# Patient Record
Sex: MALE | Race: BLACK OR AFRICAN AMERICAN | NOT HISPANIC OR LATINO | Employment: OTHER | URBAN - METROPOLITAN AREA
[De-identification: names, ages, dates, MRNs, and addresses within clinical notes are randomized per-mention and may not be internally consistent; named-entity substitution may affect disease eponyms.]

---

## 2017-05-10 ENCOUNTER — HISTORICAL (OUTPATIENT)
Dept: ENDOSCOPY | Facility: HOSPITAL | Age: 52
End: 2017-05-10

## 2018-02-14 ENCOUNTER — HISTORICAL (OUTPATIENT)
Dept: ADMINISTRATIVE | Facility: HOSPITAL | Age: 53
End: 2018-02-14

## 2018-02-14 LAB
ABS NEUT (OLG): 1.84 X10(3)/MCL (ref 2.1–9.2)
ALBUMIN SERPL-MCNC: 3.2 GM/DL (ref 3.4–5)
ALBUMIN/GLOB SERPL: 0.6 RATIO (ref 1.1–2)
ALP SERPL-CCNC: 111 UNIT/L (ref 50–136)
ALT SERPL-CCNC: 24 UNIT/L (ref 12–78)
AST SERPL-CCNC: 54 UNIT/L (ref 15–37)
BASOPHILS # BLD AUTO: 0 X10(3)/MCL (ref 0–0.2)
BASOPHILS NFR BLD AUTO: 1 %
BILIRUB SERPL-MCNC: 3.2 MG/DL (ref 0.2–1)
BILIRUBIN DIRECT+TOT PNL SERPL-MCNC: 1.4 MG/DL (ref 0–0.5)
BILIRUBIN DIRECT+TOT PNL SERPL-MCNC: 1.8 MG/DL (ref 0–0.8)
BUN SERPL-MCNC: 7 MG/DL (ref 7–18)
CALCIUM SERPL-MCNC: 9.4 MG/DL (ref 8.5–10.1)
CHLORIDE SERPL-SCNC: 104 MMOL/L (ref 98–107)
CO2 SERPL-SCNC: 25 MMOL/L (ref 21–32)
CREAT SERPL-MCNC: 0.82 MG/DL (ref 0.7–1.3)
EOSINOPHIL # BLD AUTO: 0.1 X10(3)/MCL (ref 0–0.9)
EOSINOPHIL NFR BLD AUTO: 4 %
ERYTHROCYTE [DISTWIDTH] IN BLOOD BY AUTOMATED COUNT: 24.4 % (ref 11.5–17)
FERRITIN SERPL-MCNC: 125.3 NG/ML (ref 8–388)
GLOBULIN SER-MCNC: 5.1 GM/DL (ref 2.4–3.5)
GLUCOSE SERPL-MCNC: 100 MG/DL (ref 74–106)
HCT VFR BLD AUTO: 37.9 % (ref 42–52)
HGB BLD-MCNC: 12.5 GM/DL (ref 14–18)
INR PPP: 1.5 (ref 0–1.27)
IRON SATN MFR SERPL: 80.1 % (ref 20–50)
IRON SERPL-MCNC: 249 MCG/DL (ref 50–175)
LYMPHOCYTES # BLD AUTO: 1 X10(3)/MCL (ref 0.6–4.6)
LYMPHOCYTES NFR BLD AUTO: 29 %
MCH RBC QN AUTO: 28.5 PG (ref 27–31)
MCHC RBC AUTO-ENTMCNC: 33 GM/DL (ref 33–36)
MCV RBC AUTO: 86.3 FL (ref 80–94)
MONOCYTES # BLD AUTO: 0.4 X10(3)/MCL (ref 0.1–1.3)
MONOCYTES NFR BLD AUTO: 13 %
NEUTROPHILS # BLD AUTO: 1.84 X10(3)/MCL (ref 1.4–7.9)
NEUTROPHILS NFR BLD AUTO: 54 %
PLATELET # BLD AUTO: 67 X10(3)/MCL (ref 130–400)
PMV BLD AUTO: ABNORMAL FL (ref 7.4–10.4)
POTASSIUM SERPL-SCNC: 4.1 MMOL/L (ref 3.5–5.1)
PROT SERPL-MCNC: 8.3 GM/DL (ref 6.4–8.2)
PROTHROMBIN TIME: 18.6 SECOND(S) (ref 12.2–14.7)
RBC # BLD AUTO: 4.39 X10(6)/MCL (ref 4.7–6.1)
SODIUM SERPL-SCNC: 136 MMOL/L (ref 136–145)
TIBC SERPL-MCNC: 311 MCG/DL (ref 250–450)
TRANSFERRIN SERPL-MCNC: 232 MG/DL (ref 200–360)
WBC # SPEC AUTO: 3.4 X10(3)/MCL (ref 4.5–11.5)

## 2018-11-18 ENCOUNTER — HISTORICAL (OUTPATIENT)
Dept: ADMINISTRATIVE | Facility: HOSPITAL | Age: 53
End: 2018-11-18

## 2019-04-25 ENCOUNTER — HISTORICAL (OUTPATIENT)
Dept: LAB | Facility: HOSPITAL | Age: 54
End: 2019-04-25

## 2019-04-25 LAB
INR PPP: 1.5 (ref 0–1.3)
PROTHROMBIN TIME: 18.2 SECOND(S) (ref 12–14)

## 2019-06-12 ENCOUNTER — HISTORICAL (OUTPATIENT)
Dept: RADIOLOGY | Facility: HOSPITAL | Age: 54
End: 2019-06-12

## 2019-10-22 ENCOUNTER — HISTORICAL (OUTPATIENT)
Dept: ADMINISTRATIVE | Facility: HOSPITAL | Age: 54
End: 2019-10-22

## 2019-10-22 LAB
ABS NEUT (OLG): 3.24 X10(3)/MCL (ref 2.1–9.2)
ACANTHOCYTES (OLG): 0
ALBUMIN SERPL-MCNC: 3.2 GM/DL (ref 3.4–5)
ALBUMIN/GLOB SERPL: 0.6 RATIO (ref 1.1–2)
ALP SERPL-CCNC: 90 UNIT/L (ref 50–136)
ALT SERPL-CCNC: 44 UNIT/L (ref 12–78)
ANISOCYTOSIS BLD QL SMEAR: 1
AST SERPL-CCNC: 67 UNIT/L (ref 15–37)
BASOPHILS NFR BLD MANUAL: 1 % (ref 0–2)
BILIRUB SERPL-MCNC: 2.2 MG/DL (ref 0.2–1)
BILIRUBIN DIRECT+TOT PNL SERPL-MCNC: 1.1 MG/DL (ref 0–0.5)
BILIRUBIN DIRECT+TOT PNL SERPL-MCNC: 1.1 MG/DL (ref 0–0.8)
BUN SERPL-MCNC: 9 MG/DL (ref 7–18)
BURR CELLS BLD QL SMEAR: 0
CALCIUM SERPL-MCNC: 8.5 MG/DL (ref 8.5–10.1)
CHLORIDE SERPL-SCNC: 104 MMOL/L (ref 98–107)
CO2 SERPL-SCNC: 26 MMOL/L (ref 21–32)
CREAT SERPL-MCNC: 0.76 MG/DL (ref 0.7–1.3)
DACRYOCYTES BLD QL SMEAR: 0
EOSINOPHIL NFR BLD MANUAL: 5 % (ref 0–8)
ERYTHROCYTE [DISTWIDTH] IN BLOOD BY AUTOMATED COUNT: 16.3 % (ref 11.5–17)
GLOBULIN SER-MCNC: 5.1 GM/DL (ref 2.4–3.5)
GLUCOSE SERPL-MCNC: 89 MG/DL (ref 74–106)
HCT VFR BLD AUTO: 36.1 % (ref 42–52)
HGB BLD-MCNC: 11.5 GM/DL (ref 14–18)
INR PPP: 1.4 (ref 0–1.3)
LYMPHOCYTES NFR BLD MANUAL: 24 % (ref 13–40)
MACROCYTES BLD QL SMEAR: 1 /MCL
MCH RBC QN AUTO: 27.5 PG (ref 27–31)
MCHC RBC AUTO-ENTMCNC: 31.9 GM/DL (ref 33–36)
MCV RBC AUTO: 86.4 FL (ref 80–94)
MONOCYTES NFR BLD MANUAL: 12 % (ref 2–11)
NEUTROPHILS NFR BLD MANUAL: 58 % (ref 47–80)
OVALOCYTES BLD QL SMEAR: 1
PLATELET # BLD AUTO: 116 X10(3)/MCL (ref 130–400)
PLATELET # BLD EST: ABNORMAL 10*3/UL
PMV BLD AUTO: 11.7 FL (ref 9.4–12.4)
POIKILOCYTOSIS BLD QL SMEAR: 1
POTASSIUM SERPL-SCNC: 3.5 MMOL/L (ref 3.5–5.1)
PROT SERPL-MCNC: 8.3 GM/DL (ref 6.4–8.2)
PROTHROMBIN TIME: 17.4 SECOND(S) (ref 12–14)
RBC # BLD AUTO: 4.18 X10(6)/MCL (ref 4.7–6.1)
SODIUM SERPL-SCNC: 138 MMOL/L (ref 136–145)
SPHEROCYTES BLD QL SMEAR: 0
WBC # SPEC AUTO: 5.7 X10(3)/MCL (ref 4.5–11.5)

## 2020-04-23 ENCOUNTER — HISTORICAL (OUTPATIENT)
Dept: ADMINISTRATIVE | Facility: HOSPITAL | Age: 55
End: 2020-04-23

## 2020-04-23 LAB
ABS NEUT (OLG): 1.84 X10(3)/MCL (ref 2.1–9.2)
AFP-TM SERPL-MCNC: 4.09 NG/ML
ALBUMIN SERPL-MCNC: 3.1 GM/DL (ref 3.5–5)
ALBUMIN/GLOB SERPL: 0.6 RATIO (ref 1.1–2)
ALP SERPL-CCNC: 94 UNIT/L (ref 40–150)
ALT SERPL-CCNC: 28 UNIT/L (ref 0–55)
AST SERPL-CCNC: 56 UNIT/L (ref 5–34)
BASOPHILS # BLD AUTO: 0 X10(3)/MCL (ref 0–0.2)
BASOPHILS NFR BLD AUTO: 1 %
BILIRUB SERPL-MCNC: 2.4 MG/DL
BILIRUBIN DIRECT+TOT PNL SERPL-MCNC: 1.2 MG/DL (ref 0–0.5)
BILIRUBIN DIRECT+TOT PNL SERPL-MCNC: 1.2 MG/DL (ref 0–0.8)
BUN SERPL-MCNC: 8 MG/DL (ref 8.4–25.7)
CALCIUM SERPL-MCNC: 8.4 MG/DL (ref 8.4–10.2)
CHLORIDE SERPL-SCNC: 105 MMOL/L (ref 98–107)
CO2 SERPL-SCNC: 22 MMOL/L (ref 22–29)
CREAT SERPL-MCNC: 0.78 MG/DL (ref 0.73–1.18)
EOSINOPHIL # BLD AUTO: 0.1 X10(3)/MCL (ref 0–0.9)
EOSINOPHIL NFR BLD AUTO: 3 %
ERYTHROCYTE [DISTWIDTH] IN BLOOD BY AUTOMATED COUNT: 18.5 % (ref 11.5–17)
GLOBULIN SER-MCNC: 4.9 GM/DL (ref 2.4–3.5)
GLUCOSE SERPL-MCNC: 99 MG/DL (ref 74–100)
HCT VFR BLD AUTO: 35 % (ref 42–52)
HGB BLD-MCNC: 11.4 GM/DL (ref 14–18)
INR PPP: 1.5 (ref 0–1.3)
LYMPHOCYTES # BLD AUTO: 0.9 X10(3)/MCL (ref 0.6–4.6)
LYMPHOCYTES NFR BLD AUTO: 27 %
MCH RBC QN AUTO: 27.9 PG (ref 27–31)
MCHC RBC AUTO-ENTMCNC: 32.6 GM/DL (ref 33–36)
MCV RBC AUTO: 85.8 FL (ref 80–94)
MONOCYTES # BLD AUTO: 0.5 X10(3)/MCL (ref 0.1–1.3)
MONOCYTES NFR BLD AUTO: 15 %
NEUTROPHILS # BLD AUTO: 1.84 X10(3)/MCL (ref 2.1–9.2)
NEUTROPHILS NFR BLD AUTO: 55 %
PLATELET # BLD AUTO: 60 X10(3)/MCL (ref 130–400)
PMV BLD AUTO: ABNORMAL FL (ref 9.4–12.4)
POTASSIUM SERPL-SCNC: 3.9 MMOL/L (ref 3.5–5.1)
PROT SERPL-MCNC: 8 GM/DL (ref 6.4–8.3)
PROTHROMBIN TIME: 17.1 SECOND(S) (ref 11.1–13.7)
RBC # BLD AUTO: 4.08 X10(6)/MCL (ref 4.7–6.1)
SODIUM SERPL-SCNC: 137 MMOL/L (ref 136–145)
WBC # SPEC AUTO: 3.4 X10(3)/MCL (ref 4.5–11.5)

## 2020-08-31 ENCOUNTER — HISTORICAL (OUTPATIENT)
Dept: ADMINISTRATIVE | Facility: HOSPITAL | Age: 55
End: 2020-08-31

## 2020-09-29 ENCOUNTER — HOSPITAL ENCOUNTER (INPATIENT)
Age: 55
LOS: 3 days | Discharge: HOME | DRG: 433 | End: 2020-10-02
Payer: COMMERCIAL

## 2020-09-29 DIAGNOSIS — I86.1: ICD-10-CM

## 2020-09-29 DIAGNOSIS — K70.30: Primary | ICD-10-CM

## 2020-09-29 DIAGNOSIS — K72.90: ICD-10-CM

## 2020-09-29 DIAGNOSIS — E87.2: ICD-10-CM

## 2020-09-29 DIAGNOSIS — Z79.899: ICD-10-CM

## 2020-09-29 DIAGNOSIS — K57.30: ICD-10-CM
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
## 2020-09-29 DIAGNOSIS — F10.10: ICD-10-CM

## 2020-09-29 DIAGNOSIS — E87.6: ICD-10-CM

## 2020-09-29 DIAGNOSIS — K31.89: ICD-10-CM

## 2020-09-29 DIAGNOSIS — K76.0: ICD-10-CM

## 2020-09-29 DIAGNOSIS — I85.10: ICD-10-CM

## 2020-09-29 DIAGNOSIS — K76.6: ICD-10-CM

## 2020-09-29 DIAGNOSIS — K80.20: ICD-10-CM

## 2020-09-29 DIAGNOSIS — D61.818: ICD-10-CM

## 2020-09-29 DIAGNOSIS — E80.6: ICD-10-CM

## 2020-09-29 DIAGNOSIS — F41.9: ICD-10-CM

## 2020-09-29 DIAGNOSIS — K44.9: ICD-10-CM

## 2020-09-29 DIAGNOSIS — F43.10: ICD-10-CM

## 2020-10-19 ENCOUNTER — TELEPHONE (OUTPATIENT)
Dept: GASTROENTEROLOGY | Facility: CLINIC | Age: 55
End: 2020-10-19

## 2020-10-19 NOTE — TELEPHONE ENCOUNTER
Spoke with Cass at gastroenterology clinic and made her aware that I had not received the referral on Mr. Townsend.     Confirmed our office fax number with Cass and made her aware that I would contact patient when referral is received. Cass has re-faxed referral.

## 2020-10-19 NOTE — TELEPHONE ENCOUNTER
----- Message from Alina Hardy sent at 10/19/2020  8:18 AM CDT -----  States she's calling regarding a referral for possible liver transplant. [facility] she sent the fax on Thursday. Please call Cass (Dr Mauro Lin) [phone]. Thank you

## 2020-10-20 ENCOUNTER — TELEPHONE (OUTPATIENT)
Dept: GASTROENTEROLOGY | Facility: CLINIC | Age: 55
End: 2020-10-20

## 2020-10-20 NOTE — TELEPHONE ENCOUNTER
Attempted to contact patient at [phone] (his parents) and also [phone] (cell) with no answer. Left a voicemail message on parents phone to return call. Voicemail is not set up on patient cell.     Re: referral from Gastro clinic for cirrhosis

## 2020-10-22 ENCOUNTER — TELEPHONE (OUTPATIENT)
Dept: GASTROENTEROLOGY | Facility: CLINIC | Age: 55
End: 2020-10-22

## 2020-10-22 NOTE — TELEPHONE ENCOUNTER
----- Message from Shannan Harris sent at 10/22/2020  9:35 AM CDT -----  .Type:  Sooner Apoointment Request    Caller is requesting a sooner appointment.  Caller declined first available appointment listed below.  Caller will not accept being placed on the waitlist and is requesting a message be sent to doctor.  Name of Caller:  justus //  nurse for Dr nguyen   When is the first available appointment?  Nothing came up   Symptoms pt is coming in on a referral   Would the patient rather a call back or a response via [facility]?   Call    Best Call Back Number:  [phone] (home)  and Dr ne [phone]   Additional Information:    Caller is requesting a call back from the nurse in regards to the pt referral please that they sent to the office 10/15 and the 10/19/2020 please

## 2020-11-25 ENCOUNTER — OFFICE VISIT (OUTPATIENT)
Dept: GASTROENTEROLOGY | Facility: CLINIC | Age: 55
End: 2020-11-25
Payer: MEDICAID

## 2020-11-25 ENCOUNTER — LAB VISIT (OUTPATIENT)
Dept: LAB | Facility: HOSPITAL | Age: 55
End: 2020-11-25
Attending: INTERNAL MEDICINE
Payer: MEDICAID

## 2020-11-25 VITALS — WEIGHT: 188.94 LBS | HEIGHT: 72 IN | BODY MASS INDEX: 25.59 KG/M2

## 2020-11-25 DIAGNOSIS — I85.10 SECONDARY ESOPHAGEAL VARICES WITHOUT BLEEDING: ICD-10-CM

## 2020-11-25 DIAGNOSIS — K70.30 ALCOHOLIC CIRRHOSIS OF LIVER WITHOUT ASCITES: Primary | ICD-10-CM

## 2020-11-25 DIAGNOSIS — K70.30 ALCOHOLIC CIRRHOSIS OF LIVER WITHOUT ASCITES: ICD-10-CM

## 2020-11-25 LAB
AFP SERPL-MCNC: 3.1 NG/ML (ref 0–8.4)
ALBUMIN SERPL BCP-MCNC: 3.4 G/DL (ref 3.5–5.2)
ALP SERPL-CCNC: 100 U/L (ref 55–135)
ALT SERPL W/O P-5'-P-CCNC: 53 U/L (ref 10–44)
ANION GAP SERPL CALC-SCNC: 10 MMOL/L (ref 8–16)
AST SERPL-CCNC: 87 U/L (ref 10–40)
BASOPHILS # BLD AUTO: 0.05 K/UL (ref 0–0.2)
BASOPHILS NFR BLD: 1.2 % (ref 0–1.9)
BILIRUB SERPL-MCNC: 1.1 MG/DL (ref 0.1–1)
BUN SERPL-MCNC: 8 MG/DL (ref 6–20)
CALCIUM SERPL-MCNC: 8.3 MG/DL (ref 8.7–10.5)
CHLORIDE SERPL-SCNC: 110 MMOL/L (ref 95–110)
CO2 SERPL-SCNC: 24 MMOL/L (ref 23–29)
CREAT SERPL-MCNC: 0.9 MG/DL (ref 0.5–1.4)
DIFFERENTIAL METHOD: ABNORMAL
EOSINOPHIL # BLD AUTO: 0.1 K/UL (ref 0–0.5)
EOSINOPHIL NFR BLD: 2.6 % (ref 0–8)
ERYTHROCYTE [DISTWIDTH] IN BLOOD BY AUTOMATED COUNT: 20.4 % (ref 11.5–14.5)
EST. GFR  (AFRICAN AMERICAN): >60 ML/MIN/1.73 M^2
EST. GFR  (NON AFRICAN AMERICAN): >60 ML/MIN/1.73 M^2
GLUCOSE SERPL-MCNC: 97 MG/DL (ref 70–110)
HCT VFR BLD AUTO: 34.2 % (ref 40–54)
HGB BLD-MCNC: 11.1 G/DL (ref 14–18)
IMM GRANULOCYTES # BLD AUTO: 0 K/UL (ref 0–0.04)
IMM GRANULOCYTES NFR BLD AUTO: 0 % (ref 0–0.5)
INR PPP: 1.2 (ref 0.8–1.2)
LYMPHOCYTES # BLD AUTO: 2.1 K/UL (ref 1–4.8)
LYMPHOCYTES NFR BLD: 49.2 % (ref 18–48)
MCH RBC QN AUTO: 27.1 PG (ref 27–31)
MCHC RBC AUTO-ENTMCNC: 32.5 G/DL (ref 32–36)
MCV RBC AUTO: 83 FL (ref 82–98)
MONOCYTES # BLD AUTO: 0.4 K/UL (ref 0.3–1)
MONOCYTES NFR BLD: 9.7 % (ref 4–15)
NEUTROPHILS # BLD AUTO: 1.6 K/UL (ref 1.8–7.7)
NEUTROPHILS NFR BLD: 37.3 % (ref 38–73)
NRBC BLD-RTO: 0 /100 WBC
PLATELET # BLD AUTO: 66 K/UL (ref 150–350)
PMV BLD AUTO: ABNORMAL FL (ref 9.2–12.9)
POTASSIUM SERPL-SCNC: 3.5 MMOL/L (ref 3.5–5.1)
PROT SERPL-MCNC: 8.1 G/DL (ref 6–8.4)
PROTHROMBIN TIME: 12.8 SEC (ref 9–12.5)
RBC # BLD AUTO: 4.1 M/UL (ref 4.6–6.2)
SODIUM SERPL-SCNC: 144 MMOL/L (ref 136–145)
WBC # BLD AUTO: 4.21 K/UL (ref 3.9–12.7)

## 2020-11-25 PROCEDURE — 99213 OFFICE O/P EST LOW 20 MIN: CPT | Mod: PBBFAC | Performed by: INTERNAL MEDICINE

## 2020-11-25 PROCEDURE — 82105 ALPHA-FETOPROTEIN SERUM: CPT

## 2020-11-25 PROCEDURE — 99999 PR PBB SHADOW E&M-EST. PATIENT-LVL III: CPT | Mod: PBBFAC,,, | Performed by: INTERNAL MEDICINE

## 2020-11-25 PROCEDURE — 85610 PROTHROMBIN TIME: CPT

## 2020-11-25 PROCEDURE — 80053 COMPREHEN METABOLIC PANEL: CPT

## 2020-11-25 PROCEDURE — 36415 COLL VENOUS BLD VENIPUNCTURE: CPT

## 2020-11-25 PROCEDURE — 99204 PR OFFICE/OUTPT VISIT, NEW, LEVL IV, 45-59 MIN: ICD-10-PCS | Mod: S$PBB,,, | Performed by: INTERNAL MEDICINE

## 2020-11-25 PROCEDURE — 99204 OFFICE O/P NEW MOD 45 MIN: CPT | Mod: S$PBB,,, | Performed by: INTERNAL MEDICINE

## 2020-11-25 PROCEDURE — 86706 HEP B SURFACE ANTIBODY: CPT

## 2020-11-25 PROCEDURE — 86803 HEPATITIS C AB TEST: CPT

## 2020-11-25 PROCEDURE — 87340 HEPATITIS B SURFACE AG IA: CPT

## 2020-11-25 PROCEDURE — 85025 COMPLETE CBC W/AUTO DIFF WBC: CPT

## 2020-11-25 PROCEDURE — 99999 PR PBB SHADOW E&M-EST. PATIENT-LVL III: ICD-10-PCS | Mod: PBBFAC,,, | Performed by: INTERNAL MEDICINE

## 2020-11-25 NOTE — LETTER
November 25, 2020      Mauro Lin MD  [address] [facility]  Gastroenterology Clinic  [place] [address]           [facility]  [address] [facility] [address] [facility]  [place] [address]  Phone: [phone]  Fax: [phone]          Patient: Yong Townsend   MR Number: [id number]   YOB: 1965   Date of Visit: 11/25/2020       Dear Dr. Mauro Lin:    Thank you for referring Yong Townsend to me for evaluation. Attached you will find relevant portions of my assessment and plan of care.    If you have questions, please do not hesitate to call me. I look forward to following Yong Townsend along with you.    Sincerely,    Sisi Whalen MD    Enclosure  CC:  No Recipients    If you would like to receive this communication electronically, please contact [email or web address] or [phone] to request more information on [employer] access.    For providers and/or their staff who would like to refer a patient to [facility], please contact us through our one-stop-shop provider referral line, [facility], at [phone].    If you feel you have received this communication in error or would no longer like to receive these types of communications, please e-mail [email or web address]

## 2020-11-25 NOTE — PROGRESS NOTES
Subjective:     Yong Townsend is here for evaluation of Cirrhosis (referred by Dr. Lin.)      HPI  Yong Townsend is here for evaluation and management of cirrhosis secondary to alcohol.  He knowledge is that he has a strong history of alcohol use.  He reports he decrease significantly but still had a drink around 2 weeks ago.  He says he is willing to stop drinking he dislikes strong taste.  He did have an episode of variceal bleeding sometime ago for which he was banded.  He has continued to require banding and is also on a beta-blocker.  Denies any issues of hepatic encephalopathy or ascites.  For the most part he feels well.  He remains active in continues to work.    Outside records reviewed labs from April 2020 show an INR of 1.5, white blood cell count 3.4, hemoglobin 11.4, platelet count 60, sodium 137, creatinine 0.78, AST 56, ALT 28, total bilirubin 2.4, albumin 3.1     Last upper endoscopy from June 2020 showed scar tissue from previous banding and small esophageal varices    Ultrasound from January 2020 showed cirrhosis with evidence of portal hypertension    Review of Systems   Constitutional: Negative.    HENT: Negative.    Eyes: Negative.    Respiratory: Negative.    Cardiovascular: Negative.    Gastrointestinal: Negative.    Genitourinary: Negative.    Musculoskeletal: Negative.    Skin: Negative.    Neurological: Negative.    Psychiatric/Behavioral: Negative.        Objective:     Physical Exam  Vitals signs reviewed.   Constitutional:       General: He is not in acute distress.     Appearance: He is well-developed.   HENT:      Head: Normocephalic and atraumatic.      Mouth/Throat:      Pharynx: No oropharyngeal exudate.   Eyes:      General: No scleral icterus.        Right eye: No discharge.         Left eye: No discharge.      Conjunctiva/sclera: Conjunctivae normal.      Pupils: Pupils are equal, round, and reactive to light.   Pulmonary:      Effort: Pulmonary effort is normal. No  respiratory distress.      Breath sounds: Normal breath sounds. No wheezing.   Abdominal:      General: There is no distension.      Palpations: Abdomen is soft.      Tenderness: There is no abdominal tenderness.   Neurological:      Mental Status: He is alert and oriented to person, place, and time.   Psychiatric:         Behavior: Behavior normal.         MELD-Na score: 9 at 11/25/2020  9:14 AM  MELD score: 9 at 11/25/2020  9:14 AM  Calculated from:  Serum Creatinine: 0.9 mg/dL (Rounded to 1 mg/dL) at 11/25/2020  9:14 AM  Serum Sodium: 144 mmol/L (Rounded to 137 mmol/L) at 11/25/2020  9:14 AM  Total Bilirubin: 1.1 mg/dL at 11/25/2020  9:14 AM  INR(ratio): 1.2 at 11/25/2020  9:14 AM  Age: 55 years 10 months    WBC   Date Value Ref Range Status   11/25/2020 4.21 3.90 - 12.70 K/uL Final     Hemoglobin   Date Value Ref Range Status   11/25/2020 11.1 (L) 14.0 - 18.0 g/dL Final     Hematocrit   Date Value Ref Range Status   11/25/2020 34.2 (L) 40.0 - 54.0 % Final     Platelets   Date Value Ref Range Status   11/25/2020 66 (L) 150 - 350 K/uL Final     BUN   Date Value Ref Range Status   11/25/2020 8 6 - 20 mg/dL Final     Creatinine   Date Value Ref Range Status   11/25/2020 0.9 0.5 - 1.4 mg/dL Final     Glucose   Date Value Ref Range Status   11/25/2020 97 70 - 110 mg/dL Final     Calcium   Date Value Ref Range Status   11/25/2020 8.3 (L) 8.7 - 10.5 mg/dL Final     Sodium   Date Value Ref Range Status   11/25/2020 144 136 - 145 mmol/L Final     Potassium   Date Value Ref Range Status   11/25/2020 3.5 3.5 - 5.1 mmol/L Final     Chloride   Date Value Ref Range Status   11/25/2020 110 95 - 110 mmol/L Final     AST   Date Value Ref Range Status   11/25/2020 87 (H) 10 - 40 U/L Final     ALT   Date Value Ref Range Status   11/25/2020 53 (H) 10 - 44 U/L Final     Alkaline Phosphatase   Date Value Ref Range Status   11/25/2020 100 55 - 135 U/L Final     Total Bilirubin   Date Value Ref Range Status   11/25/2020 1.1 (H) 0.1 -  1.0 mg/dL Final     Comment:     For infants and newborns, interpretation of results should be based  on gestational age, weight and in agreement with clinical  observations.  Premature Infant recommended reference ranges:  Up to 24 hours.............<8.0 mg/dL  Up to 48 hours............<12.0 mg/dL  3-5 days..................<15.0 mg/dL  6-29 days.................<15.0 mg/dL       Albumin   Date Value Ref Range Status   11/25/2020 3.4 (L) 3.5 - 5.2 g/dL Final     INR   Date Value Ref Range Status   11/25/2020 1.2 0.8 - 1.2 Final     Comment:     Coumadin Therapy:  2.0 - 3.0 for INR for all indicators except mechanical heart valves  and antiphospholipid syndromes which should use 2.5 - 3.5.           Assessment/Plan:     1. Alcoholic cirrhosis of liver without ascites    2. Secondary esophageal varices without bleeding      Yong Townsend is a 55 y.o. male withCirrhosis (referred by Dr. Lin.)    Alcoholic cirrhosis of liver without ascites-meld score is low and patient without any recent decompensation.  No indication for liver transplantation at this time but did explain to patient that I am concerned about his risk for additional decompensation and need for transplant with continued alcohol use.  Strongly encouraged complete alcohol abstinence to help delay need for transplant and to make sure that he will be a transplant candidate if needed.  -continue to monitor meld score  -due for HCC surveillance, ultrasound ordered and will repeat in 6 months  -     Comprehensive Metabolic Panel; Standing  -     CBC Auto Differential; Standing  -     AFP Tumor Marker; Standing  -     Protime-INR; Standing  -     Hepatitis B Surface Ab, Qualitative; Future; Expected date: 11/25/2020  -     Hepatitis B Surface Antigen; Future; Expected date: 11/25/2020  -     Hepatitis C Antibody; Future; Expected date: 11/25/2020  -     US Abdomen Limited; Standing      Esophageal varices-continue with secondary prevention  -next upper  endoscopy due in June 2021, can be done local endoscopist  -continue on beta-blocker    Return to clinic in 6 months with preclinic labs and imaging      Sisi Whalen MD

## 2020-11-27 LAB
HBV SURFACE AB SER-ACNC: NEGATIVE M[IU]/ML
HBV SURFACE AG SERPL QL IA: NEGATIVE
HCV AB SERPL QL IA: NEGATIVE

## 2020-11-30 ENCOUNTER — TELEPHONE (OUTPATIENT)
Dept: GASTROENTEROLOGY | Facility: CLINIC | Age: 55
End: 2020-11-30

## 2020-11-30 NOTE — TELEPHONE ENCOUNTER
----- Message from Bianca Najera sent at 11/30/2020  8:04 AM CST -----  Contact: [facility]  [facility] with [facility] requesting clinic notes from 11/25/2020. Please fax to [phone]. If needed please call clinic back at [phone]

## 2021-01-08 ENCOUNTER — TELEPHONE (OUTPATIENT)
Dept: HEPATOLOGY | Facility: CLINIC | Age: 56
End: 2021-01-08

## 2021-04-12 ENCOUNTER — TELEPHONE (OUTPATIENT)
Dept: UROLOGY | Facility: CLINIC | Age: 56
End: 2021-04-12

## 2021-05-25 ENCOUNTER — LAB VISIT (OUTPATIENT)
Dept: LAB | Facility: HOSPITAL | Age: 56
End: 2021-05-25
Attending: INTERNAL MEDICINE
Payer: MEDICAID

## 2021-05-25 DIAGNOSIS — K70.30 ALCOHOLIC CIRRHOSIS OF LIVER WITHOUT ASCITES: ICD-10-CM

## 2021-05-25 LAB
AFP SERPL-MCNC: 4.1 NG/ML (ref 0–8.4)
ALBUMIN SERPL BCP-MCNC: 3.3 G/DL (ref 3.5–5.2)
ALP SERPL-CCNC: 114 U/L (ref 55–135)
ALT SERPL W/O P-5'-P-CCNC: 46 U/L (ref 10–44)
ANION GAP SERPL CALC-SCNC: 12 MMOL/L (ref 8–16)
AST SERPL-CCNC: 134 U/L (ref 10–40)
BASOPHILS # BLD AUTO: 0.06 K/UL (ref 0–0.2)
BASOPHILS NFR BLD: 1.8 % (ref 0–1.9)
BILIRUB SERPL-MCNC: 3.3 MG/DL (ref 0.1–1)
BUN SERPL-MCNC: 8 MG/DL (ref 6–20)
CALCIUM SERPL-MCNC: 8.6 MG/DL (ref 8.7–10.5)
CHLORIDE SERPL-SCNC: 110 MMOL/L (ref 95–110)
CO2 SERPL-SCNC: 22 MMOL/L (ref 23–29)
CREAT SERPL-MCNC: 0.8 MG/DL (ref 0.5–1.4)
DIFFERENTIAL METHOD: ABNORMAL
EOSINOPHIL # BLD AUTO: 0.1 K/UL (ref 0–0.5)
EOSINOPHIL NFR BLD: 2.9 % (ref 0–8)
ERYTHROCYTE [DISTWIDTH] IN BLOOD BY AUTOMATED COUNT: 20.6 % (ref 11.5–14.5)
EST. GFR  (AFRICAN AMERICAN): >60 ML/MIN/1.73 M^2
EST. GFR  (NON AFRICAN AMERICAN): >60 ML/MIN/1.73 M^2
GLUCOSE SERPL-MCNC: 102 MG/DL (ref 70–110)
HCT VFR BLD AUTO: 32.8 % (ref 40–54)
HGB BLD-MCNC: 11.1 G/DL (ref 14–18)
IMM GRANULOCYTES # BLD AUTO: 0 K/UL (ref 0–0.04)
IMM GRANULOCYTES NFR BLD AUTO: 0 % (ref 0–0.5)
INR PPP: 1.3 (ref 0.8–1.2)
LYMPHOCYTES # BLD AUTO: 1.3 K/UL (ref 1–4.8)
LYMPHOCYTES NFR BLD: 37.4 % (ref 18–48)
MCH RBC QN AUTO: 28.8 PG (ref 27–31)
MCHC RBC AUTO-ENTMCNC: 33.8 G/DL (ref 32–36)
MCV RBC AUTO: 85 FL (ref 82–98)
MONOCYTES # BLD AUTO: 0.4 K/UL (ref 0.3–1)
MONOCYTES NFR BLD: 10.6 % (ref 4–15)
NEUTROPHILS # BLD AUTO: 1.6 K/UL (ref 1.8–7.7)
NEUTROPHILS NFR BLD: 47.3 % (ref 38–73)
NRBC BLD-RTO: 0 /100 WBC
PLATELET # BLD AUTO: 70 K/UL (ref 150–450)
PMV BLD AUTO: ABNORMAL FL (ref 9.2–12.9)
POTASSIUM SERPL-SCNC: 3.6 MMOL/L (ref 3.5–5.1)
PROT SERPL-MCNC: 8.3 G/DL (ref 6–8.4)
PROTHROMBIN TIME: 13.8 SEC (ref 9–12.5)
RBC # BLD AUTO: 3.85 M/UL (ref 4.6–6.2)
SODIUM SERPL-SCNC: 144 MMOL/L (ref 136–145)
WBC # BLD AUTO: 3.4 K/UL (ref 3.9–12.7)

## 2021-05-25 PROCEDURE — 82105 ALPHA-FETOPROTEIN SERUM: CPT | Performed by: INTERNAL MEDICINE

## 2021-05-25 PROCEDURE — 85025 COMPLETE CBC W/AUTO DIFF WBC: CPT | Performed by: INTERNAL MEDICINE

## 2021-05-25 PROCEDURE — 36415 COLL VENOUS BLD VENIPUNCTURE: CPT | Performed by: INTERNAL MEDICINE

## 2021-05-25 PROCEDURE — 80053 COMPREHEN METABOLIC PANEL: CPT | Performed by: INTERNAL MEDICINE

## 2021-05-25 PROCEDURE — 85610 PROTHROMBIN TIME: CPT | Performed by: INTERNAL MEDICINE

## 2021-06-11 ENCOUNTER — HISTORICAL (OUTPATIENT)
Dept: RADIOLOGY | Facility: HOSPITAL | Age: 56
End: 2021-06-11

## 2021-08-23 ENCOUNTER — TELEPHONE (OUTPATIENT)
Dept: HEPATOLOGY | Facility: CLINIC | Age: 56
End: 2021-08-23

## 2021-08-31 ENCOUNTER — TELEPHONE (OUTPATIENT)
Dept: HEPATOLOGY | Facility: CLINIC | Age: 56
End: 2021-08-31

## 2021-08-31 ENCOUNTER — TELEPHONE (OUTPATIENT)
Dept: RADIOLOGY | Facility: HOSPITAL | Age: 56
End: 2021-08-31

## 2021-09-01 ENCOUNTER — TELEPHONE (OUTPATIENT)
Dept: HEPATOLOGY | Facility: CLINIC | Age: 56
End: 2021-09-01

## 2021-09-16 ENCOUNTER — OFFICE VISIT (OUTPATIENT)
Dept: HEPATOLOGY | Facility: CLINIC | Age: 56
End: 2021-09-16
Payer: MEDICAID

## 2021-09-16 ENCOUNTER — HOSPITAL ENCOUNTER (OUTPATIENT)
Dept: RADIOLOGY | Facility: HOSPITAL | Age: 56
Discharge: HOME | End: 2021-09-16
Attending: INTERNAL MEDICINE
Payer: MEDICAID

## 2021-09-16 VITALS
HEART RATE: 73 BPM | SYSTOLIC BLOOD PRESSURE: 120 MMHG | DIASTOLIC BLOOD PRESSURE: 72 MMHG | HEIGHT: 72 IN | WEIGHT: 179.25 LBS | BODY MASS INDEX: 24.28 KG/M2

## 2021-09-16 DIAGNOSIS — I85.10 SECONDARY ESOPHAGEAL VARICES WITHOUT BLEEDING: ICD-10-CM

## 2021-09-16 DIAGNOSIS — Z78.9 ALCOHOL USE: ICD-10-CM

## 2021-09-16 DIAGNOSIS — K70.30 ALCOHOLIC CIRRHOSIS OF LIVER WITHOUT ASCITES: Primary | ICD-10-CM

## 2021-09-16 PROBLEM — F10.90 ALCOHOL USE: Status: ACTIVE | Noted: 2021-09-16

## 2021-09-16 PROCEDURE — 99213 OFFICE O/P EST LOW 20 MIN: CPT | Mod: PBBFAC,25 | Performed by: NURSE PRACTITIONER

## 2021-09-16 PROCEDURE — 76705 ECHO EXAM OF ABDOMEN: CPT | Mod: TC

## 2021-09-16 PROCEDURE — 76705 US ABDOMEN LIMITED: ICD-10-PCS | Mod: 26,,, | Performed by: RADIOLOGY

## 2021-09-16 PROCEDURE — 76705 ECHO EXAM OF ABDOMEN: CPT | Mod: 26,,, | Performed by: RADIOLOGY

## 2021-09-16 PROCEDURE — 99214 PR OFFICE/OUTPT VISIT, EST, LEVL IV, 30-39 MIN: ICD-10-PCS | Mod: S$PBB,,, | Performed by: NURSE PRACTITIONER

## 2021-09-16 PROCEDURE — 99214 OFFICE O/P EST MOD 30 MIN: CPT | Mod: S$PBB,,, | Performed by: NURSE PRACTITIONER

## 2021-09-16 PROCEDURE — 99999 PR PBB SHADOW E&M-EST. PATIENT-LVL III: ICD-10-PCS | Mod: PBBFAC,,, | Performed by: NURSE PRACTITIONER

## 2021-09-16 PROCEDURE — 99999 PR PBB SHADOW E&M-EST. PATIENT-LVL III: CPT | Mod: PBBFAC,,, | Performed by: NURSE PRACTITIONER

## 2021-09-16 RX ORDER — MELOXICAM 15 MG/1
7.5 TABLET ORAL 2 TIMES DAILY
COMMUNITY
Start: 2021-08-17 | End: 2022-01-10

## 2021-09-16 RX ORDER — PROPRANOLOL HYDROCHLORIDE 20 MG/1
20 TABLET ORAL 2 TIMES DAILY
COMMUNITY
Start: 2021-06-07

## 2021-09-16 RX ORDER — FOLIC ACID 1 MG/1
1000 TABLET ORAL EVERY MORNING
COMMUNITY
Start: 2021-06-07 | End: 2022-01-10 | Stop reason: SDUPTHER

## 2021-11-02 ENCOUNTER — HISTORICAL (OUTPATIENT)
Dept: ADMINISTRATIVE | Facility: HOSPITAL | Age: 56
End: 2021-11-02

## 2021-12-10 ENCOUNTER — TELEPHONE (OUTPATIENT)
Dept: HEPATOLOGY | Facility: CLINIC | Age: 56
End: 2021-12-10
Payer: MEDICAID

## 2022-01-10 ENCOUNTER — LAB VISIT (OUTPATIENT)
Dept: LAB | Facility: HOSPITAL | Age: 57
End: 2022-01-10
Attending: INTERNAL MEDICINE
Payer: MEDICAID

## 2022-01-10 ENCOUNTER — OFFICE VISIT (OUTPATIENT)
Dept: HEPATOLOGY | Facility: CLINIC | Age: 57
End: 2022-01-10
Payer: MEDICAID

## 2022-01-10 VITALS
HEART RATE: 74 BPM | BODY MASS INDEX: 25.5 KG/M2 | SYSTOLIC BLOOD PRESSURE: 150 MMHG | WEIGHT: 188.25 LBS | DIASTOLIC BLOOD PRESSURE: 90 MMHG | HEIGHT: 72 IN

## 2022-01-10 DIAGNOSIS — K70.30 ALCOHOLIC CIRRHOSIS OF LIVER WITHOUT ASCITES: ICD-10-CM

## 2022-01-10 DIAGNOSIS — K70.30 ALCOHOLIC CIRRHOSIS OF LIVER WITHOUT ASCITES: Primary | ICD-10-CM

## 2022-01-10 LAB
ALBUMIN SERPL BCP-MCNC: 2.6 G/DL (ref 3.5–5.2)
ALP SERPL-CCNC: 134 U/L (ref 55–135)
ALT SERPL W/O P-5'-P-CCNC: 25 U/L (ref 10–44)
ANION GAP SERPL CALC-SCNC: 8 MMOL/L (ref 8–16)
AST SERPL-CCNC: 98 U/L (ref 10–40)
BILIRUB SERPL-MCNC: 7.5 MG/DL (ref 0.1–1)
BUN SERPL-MCNC: 8 MG/DL (ref 6–20)
CALCIUM SERPL-MCNC: 8.5 MG/DL (ref 8.7–10.5)
CHLORIDE SERPL-SCNC: 105 MMOL/L (ref 95–110)
CO2 SERPL-SCNC: 23 MMOL/L (ref 23–29)
CREAT SERPL-MCNC: 0.7 MG/DL (ref 0.5–1.4)
ERYTHROCYTE [DISTWIDTH] IN BLOOD BY AUTOMATED COUNT: 21.3 % (ref 11.5–14.5)
EST. GFR  (AFRICAN AMERICAN): >60 ML/MIN/1.73 M^2
EST. GFR  (NON AFRICAN AMERICAN): >60 ML/MIN/1.73 M^2
GLUCOSE SERPL-MCNC: 89 MG/DL (ref 70–110)
HCT VFR BLD AUTO: 26.9 % (ref 40–54)
HGB BLD-MCNC: 8.8 G/DL (ref 14–18)
INR PPP: 1.5 (ref 0.8–1.2)
MCH RBC QN AUTO: 28.5 PG (ref 27–31)
MCHC RBC AUTO-ENTMCNC: 32.7 G/DL (ref 32–36)
MCV RBC AUTO: 87 FL (ref 82–98)
PLATELET # BLD AUTO: 40 K/UL (ref 150–450)
PMV BLD AUTO: ABNORMAL FL (ref 9.2–12.9)
POTASSIUM SERPL-SCNC: 3.5 MMOL/L (ref 3.5–5.1)
PROT SERPL-MCNC: 7.9 G/DL (ref 6–8.4)
PROTHROMBIN TIME: 15.3 SEC (ref 9–12.5)
RBC # BLD AUTO: 3.09 M/UL (ref 4.6–6.2)
SODIUM SERPL-SCNC: 136 MMOL/L (ref 136–145)
WBC # BLD AUTO: 3.09 K/UL (ref 3.9–12.7)

## 2022-01-10 PROCEDURE — 99999 PR PBB SHADOW E&M-EST. PATIENT-LVL III: CPT | Mod: PBBFAC,,, | Performed by: INTERNAL MEDICINE

## 2022-01-10 PROCEDURE — 99213 OFFICE O/P EST LOW 20 MIN: CPT | Mod: PBBFAC | Performed by: INTERNAL MEDICINE

## 2022-01-10 PROCEDURE — 3008F BODY MASS INDEX DOCD: CPT | Mod: CPTII,,, | Performed by: INTERNAL MEDICINE

## 2022-01-10 PROCEDURE — 3080F PR MOST RECENT DIASTOLIC BLOOD PRESSURE >= 90 MM HG: ICD-10-PCS | Mod: CPTII,,, | Performed by: INTERNAL MEDICINE

## 2022-01-10 PROCEDURE — 1159F PR MEDICATION LIST DOCUMENTED IN MEDICAL RECORD: ICD-10-PCS | Mod: CPTII,,, | Performed by: INTERNAL MEDICINE

## 2022-01-10 PROCEDURE — 1159F MED LIST DOCD IN RCRD: CPT | Mod: CPTII,,, | Performed by: INTERNAL MEDICINE

## 2022-01-10 PROCEDURE — 3077F SYST BP >= 140 MM HG: CPT | Mod: CPTII,,, | Performed by: INTERNAL MEDICINE

## 2022-01-10 PROCEDURE — 99214 OFFICE O/P EST MOD 30 MIN: CPT | Mod: S$PBB,,, | Performed by: INTERNAL MEDICINE

## 2022-01-10 PROCEDURE — 3008F PR BODY MASS INDEX (BMI) DOCUMENTED: ICD-10-PCS | Mod: CPTII,,, | Performed by: INTERNAL MEDICINE

## 2022-01-10 PROCEDURE — 99214 PR OFFICE/OUTPT VISIT, EST, LEVL IV, 30-39 MIN: ICD-10-PCS | Mod: S$PBB,,, | Performed by: INTERNAL MEDICINE

## 2022-01-10 PROCEDURE — 1160F PR REVIEW ALL MEDS BY PRESCRIBER/CLIN PHARMACIST DOCUMENTED: ICD-10-PCS | Mod: CPTII,,, | Performed by: INTERNAL MEDICINE

## 2022-01-10 PROCEDURE — 80053 COMPREHEN METABOLIC PANEL: CPT | Performed by: INTERNAL MEDICINE

## 2022-01-10 PROCEDURE — 85610 PROTHROMBIN TIME: CPT | Performed by: INTERNAL MEDICINE

## 2022-01-10 PROCEDURE — 85027 COMPLETE CBC AUTOMATED: CPT | Performed by: INTERNAL MEDICINE

## 2022-01-10 PROCEDURE — 99999 PR PBB SHADOW E&M-EST. PATIENT-LVL III: ICD-10-PCS | Mod: PBBFAC,,, | Performed by: INTERNAL MEDICINE

## 2022-01-10 PROCEDURE — 3080F DIAST BP >= 90 MM HG: CPT | Mod: CPTII,,, | Performed by: INTERNAL MEDICINE

## 2022-01-10 PROCEDURE — 1160F RVW MEDS BY RX/DR IN RCRD: CPT | Mod: CPTII,,, | Performed by: INTERNAL MEDICINE

## 2022-01-10 PROCEDURE — 36415 COLL VENOUS BLD VENIPUNCTURE: CPT | Performed by: INTERNAL MEDICINE

## 2022-01-10 PROCEDURE — 3077F PR MOST RECENT SYSTOLIC BLOOD PRESSURE >= 140 MM HG: ICD-10-PCS | Mod: CPTII,,, | Performed by: INTERNAL MEDICINE

## 2022-01-10 RX ORDER — FOLIC ACID 1 MG/1
1000 TABLET ORAL EVERY MORNING
Qty: 30 TABLET | Refills: 11 | Status: SHIPPED | OUTPATIENT
Start: 2022-01-10

## 2022-01-10 NOTE — PROGRESS NOTES
"  Subjective:     Yong Townsend is here for evaluation of cirrhosis    History of Present Illness:    Since Yong Townsend's last visit, Says he had some wine for [place] and before that he does not remember when he had ETOH. Says he "cut down a lot" since dx of cirrhosis but was told by [facility] that he can have "wine or beer" occasionally, so he had it for [place] but didn't drink any for New Year. He reports undergoing detox, attended ETOH rehab in the last 5 yrs stayed sober for a long time and resumed only few drinks occasionally and he considers it was successful. He does not believe in AA says it did not help him. He is interested in IOP program as recommended by transplant program. He admits to smoking weed, says with severe anxiety/PTSD he tried multiple prescription medication and learned none of them help him as much as marijuana does. He informs me that marijuana is legalized now in [place]. He reportedly had GI bleed in the past with EGD showing EV with variceal banding. Denies episodes of ascites, confusion.    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       Review of Systems   Constitutional: Negative for fatigue and fever.   Eyes: Positive for visual disturbance.   Gastrointestinal: Negative for abdominal distention, abdominal pain and nausea.   Neurological: Positive for tremors.       Objective:     Physical Exam  Constitutional:       General: He is in acute distress.      Appearance: Normal appearance. He is ill-appearing.   Eyes:      General: Scleral icterus present.   Abdominal:      General: Bowel sounds are normal. There is no distension.      Palpations: Abdomen is soft.      Tenderness: There is no abdominal tenderness.   Skin:     Capillary Refill: Capillary refill takes more than 3 seconds.      Coloration: Skin is not jaundiced.      Findings: No bruising " or erythema.   Neurological:      Mental Status: He is alert.   Psychiatric:         Thought Content: Thought content normal.         Judgment: Judgment normal.         MELD-Na score: 18 at 9/16/2021 11:53 AM  MELD score: 18 at 9/16/2021 11:53 AM  Calculated from:  Serum Creatinine: 0.8 mg/dL (Using min of 1 mg/dL) at 9/16/2021 11:53 AM  Serum Sodium: 139 mmol/L (Using max of 137 mmol/L) at 9/16/2021 11:53 AM  Total Bilirubin: 8.5 mg/dL at 9/16/2021 11:53 AM  INR(ratio): 1.4 at 9/16/2021 11:53 AM  Age: 56 years    WBC   Date Value Ref Range Status   05/25/2021 3.40 (L) 3.90 - 12.70 K/uL Final     Hemoglobin   Date Value Ref Range Status   05/25/2021 11.1 (L) 14.0 - 18.0 g/dL Final     Hematocrit   Date Value Ref Range Status   05/25/2021 32.8 (L) 40.0 - 54.0 % Final     Platelets   Date Value Ref Range Status   05/25/2021 70 (L) 150 - 450 K/uL Final     BUN   Date Value Ref Range Status   09/16/2021 5 (L) 6 - 20 mg/dL Final     Creatinine   Date Value Ref Range Status   09/16/2021 0.8 0.5 - 1.4 mg/dL Final     Glucose   Date Value Ref Range Status   09/16/2021 98 70 - 110 mg/dL Final     Calcium   Date Value Ref Range Status   09/16/2021 8.7 8.7 - 10.5 mg/dL Final     Sodium   Date Value Ref Range Status   09/16/2021 139 136 - 145 mmol/L Final     Potassium   Date Value Ref Range Status   09/16/2021 3.5 3.5 - 5.1 mmol/L Final     Chloride   Date Value Ref Range Status   09/16/2021 107 95 - 110 mmol/L Final     AST   Date Value Ref Range Status   09/16/2021 129 (H) 10 - 40 U/L Final     ALT   Date Value Ref Range Status   09/16/2021 34 10 - 44 U/L Final     Alkaline Phosphatase   Date Value Ref Range Status   09/16/2021 127 55 - 135 U/L Final     Total Bilirubin   Date Value Ref Range Status   09/16/2021 8.5 (H) 0.1 - 1.0 mg/dL Final     Comment:     For infants and newborns, interpretation of results should be based  on gestational age, weight and in agreement with clinical  observations.    Premature Infant  "recommended reference ranges:  Up to 24 hours.............<8.0 mg/dL  Up to 48 hours............<12.0 mg/dL  3-5 days..................<15.0 mg/dL  6-29 days.................<15.0 mg/dL       Albumin   Date Value Ref Range Status   09/16/2021 2.6 (L) 3.5 - 5.2 g/dL Final     INR   Date Value Ref Range Status   09/16/2021 1.4 (H) 0.8 - 1.2 Final     Comment:     Coumadin Therapy:  2.0 - 3.0 for INR for all indicators except mechanical heart valves  and antiphospholipid syndromes which should use 2.5 - 3.5.           Assessment/Plan:     1. Alcoholic cirrhosis of liver without ascites      1.Cirrhosis-    MELD-25 noted steadily increasing  Continue to monitor MELD labs  Continue with HCC surveillance- last US 9/6/21 showed no liver lesions  Continue variceal screening-last EGD 2021( report not available)   Endoscopy from June 2020 showed scar tissue from previous banding and small esophageal varices  Continue propranolol for secondary prevention    Had extensive discussion over his ongoing ETOH intake inspite of reporting " cut down" since 2020 he continues to consume ETOH. Informed ongoing liver damage and worsening LFTs, increasing risk of decompensation leading liver failure and death. Says he will not be drinking further, agrees to have psychiatry evaluation.     RTC in 3 months with preclinic labs.    I have reviewed existing labs, imaging. Educated patient and family about disease process, prognosis. Ordered required labs, images, procedures and discussed treatment plan.       Lula Burk MD  Transplant Hepatologist  Dept of Hepatology, [place] [facility] [place]        "

## 2022-02-18 ENCOUNTER — HISTORICAL (OUTPATIENT)
Dept: RADIOLOGY | Facility: HOSPITAL | Age: 57
End: 2022-02-18

## 2022-02-18 ENCOUNTER — HISTORICAL (OUTPATIENT)
Dept: ADMINISTRATIVE | Facility: HOSPITAL | Age: 57
End: 2022-02-18

## 2022-02-18 LAB
ABS NEUT (OLG): 1.14 (ref 2.1–9.2)
ALBUMIN SERPL-MCNC: 2.6 G/DL (ref 3.5–5)
ALBUMIN/GLOB SERPL: 0.6 {RATIO} (ref 1.1–2)
ALP SERPL-CCNC: 105 U/L (ref 40–150)
ALT SERPL-CCNC: 15 U/L (ref 0–55)
ANISOCYTOSIS BLD QL SMEAR: 1
AST SERPL-CCNC: 39 U/L (ref 5–34)
BASOPHILS NFR BLD MANUAL: 1 % (ref 0–2)
BILIRUB SERPL-MCNC: 3.7 MG/DL
BILIRUBIN DIRECT+TOT PNL SERPL-MCNC: 1.4 (ref 0–0.8)
BILIRUBIN DIRECT+TOT PNL SERPL-MCNC: 2.3 (ref 0–0.5)
BUN SERPL-MCNC: 6.6 MG/DL (ref 8.4–25.7)
CALCIUM SERPL-MCNC: 8.5 MG/DL (ref 8.7–10.5)
CHLORIDE SERPL-SCNC: 109 MMOL/L (ref 98–107)
CHOLEST SERPL-MCNC: 257 MG/DL
CHOLEST/HDLC SERPL: 5 {RATIO} (ref 0–5)
CO2 SERPL-SCNC: 25 MMOL/L (ref 22–29)
CREAT SERPL-MCNC: 0.88 MG/DL (ref 0.73–1.18)
CRP SERPL HS-MCNC: 0.44 MG/L
DEPRECATED CALCIDIOL+CALCIFEROL SERPL-MC: 18 NG/ML (ref 30–80)
EOSINOPHIL NFR BLD MANUAL: 5 % (ref 0–8)
ERYTHROCYTE [DISTWIDTH] IN BLOOD BY AUTOMATED COUNT: 22.2 % (ref 11.5–17)
ERYTHROCYTE [SEDIMENTATION RATE] IN BLOOD: 20 MM/H (ref 0–15)
EST. AVERAGE GLUCOSE BLD GHB EST-MCNC: 96.8 MG/DL
FERRITIN SERPL-MCNC: 27.94 NG/ML (ref 21.81–274.66)
GGT SERPL-CCNC: 239 U/L (ref 12–64)
GLOBULIN SER-MCNC: 4.5 G/DL (ref 2.4–3.5)
GLUCOSE SERPL-MCNC: 88 MG/DL (ref 74–100)
HBA1C MFR BLD: 5 %
HCT VFR BLD AUTO: 24.9 % (ref 42–52)
HDLC SERPL-MCNC: 51 MG/DL (ref 35–60)
HEMOLYSIS INTERF INDEX SERPL-ACNC: 0
HGB BLD-MCNC: 8.6 G/DL (ref 14–18)
ICTERIC INTERF INDEX SERPL-ACNC: 4
INR PPP: 1.8 (ref 0–1.3)
LDLC SERPL CALC-MCNC: 182 MG/DL (ref 50–140)
LIPEMIC INTERF INDEX SERPL-ACNC: 21
LYMPHOCYTES NFR BLD MANUAL: 22 % (ref 13–40)
MACROCYTES BLD QL SMEAR: 1
MANUAL DIFF? (OHS): YES
MCH RBC QN AUTO: 27.9 PG (ref 27–31)
MCHC RBC AUTO-ENTMCNC: 34.5 G/DL (ref 33–36)
MCV RBC AUTO: 80.8 FL (ref 80–94)
MONOCYTES NFR BLD MANUAL: 6 % (ref 2–11)
NEUTROPHILS NFR BLD MANUAL: 67 % (ref 47–80)
PLATELET # BLD AUTO: 43 10*3/UL (ref 130–400)
PMV BLD AUTO: NORMAL FL (ref 9.4–12.4)
POIKILOCYTOSIS BLD QL SMEAR: 1
POS ERR1 (OHS): NORMAL
POS ERR2 (OHS): NORMAL
POTASSIUM SERPL-SCNC: 3.7 MMOL/L (ref 3.5–5.1)
PROT SERPL-MCNC: 7.1 G/DL (ref 6.4–8.3)
PROTHROMBIN TIME: 20.4 S (ref 12.5–14.5)
RBC # BLD AUTO: 3.08 10*6/UL (ref 4.7–6.1)
SODIUM SERPL-SCNC: 141 MMOL/L (ref 136–145)
TARGETS BLD QL SMEAR: 1
TRIGL SERPL-MCNC: 122 MG/DL (ref 34–140)
VERIFY DIFF SUSPECT FLAG (OHS): NORMAL
VLDLC SERPL CALC-MCNC: 24 MG/DL
WBC # SPEC AUTO: 2.2 10*3/UL (ref 4.5–11.5)

## 2022-03-06 ENCOUNTER — HISTORICAL (OUTPATIENT)
Dept: ADMINISTRATIVE | Facility: HOSPITAL | Age: 57
End: 2022-03-06

## 2022-03-07 ENCOUNTER — HISTORICAL (OUTPATIENT)
Dept: ADMINISTRATIVE | Facility: HOSPITAL | Age: 57
End: 2022-03-07

## 2022-04-11 ENCOUNTER — OFFICE VISIT (OUTPATIENT)
Dept: HEPATOLOGY | Facility: CLINIC | Age: 57
End: 2022-04-11
Payer: MEDICAID

## 2022-04-11 VITALS
HEIGHT: 71 IN | WEIGHT: 183.88 LBS | BODY MASS INDEX: 25.74 KG/M2 | HEART RATE: 74 BPM | DIASTOLIC BLOOD PRESSURE: 80 MMHG | SYSTOLIC BLOOD PRESSURE: 140 MMHG

## 2022-04-11 DIAGNOSIS — K74.60 CIRRHOSIS OF LIVER WITHOUT ASCITES, UNSPECIFIED HEPATIC CIRRHOSIS TYPE: Primary | ICD-10-CM

## 2022-04-11 PROCEDURE — 3077F SYST BP >= 140 MM HG: CPT | Mod: CPTII,,, | Performed by: INTERNAL MEDICINE

## 2022-04-11 PROCEDURE — 3079F PR MOST RECENT DIASTOLIC BLOOD PRESSURE 80-89 MM HG: ICD-10-PCS | Mod: CPTII,,, | Performed by: INTERNAL MEDICINE

## 2022-04-11 PROCEDURE — 1159F PR MEDICATION LIST DOCUMENTED IN MEDICAL RECORD: ICD-10-PCS | Mod: CPTII,,, | Performed by: INTERNAL MEDICINE

## 2022-04-11 PROCEDURE — 3079F DIAST BP 80-89 MM HG: CPT | Mod: CPTII,,, | Performed by: INTERNAL MEDICINE

## 2022-04-11 PROCEDURE — 3008F BODY MASS INDEX DOCD: CPT | Mod: CPTII,,, | Performed by: INTERNAL MEDICINE

## 2022-04-11 PROCEDURE — 1159F MED LIST DOCD IN RCRD: CPT | Mod: CPTII,,, | Performed by: INTERNAL MEDICINE

## 2022-04-11 PROCEDURE — 99214 OFFICE O/P EST MOD 30 MIN: CPT | Mod: S$PBB,,, | Performed by: INTERNAL MEDICINE

## 2022-04-11 PROCEDURE — 3008F PR BODY MASS INDEX (BMI) DOCUMENTED: ICD-10-PCS | Mod: CPTII,,, | Performed by: INTERNAL MEDICINE

## 2022-04-11 PROCEDURE — 1160F RVW MEDS BY RX/DR IN RCRD: CPT | Mod: CPTII,,, | Performed by: INTERNAL MEDICINE

## 2022-04-11 PROCEDURE — 99213 OFFICE O/P EST LOW 20 MIN: CPT | Mod: PBBFAC | Performed by: INTERNAL MEDICINE

## 2022-04-11 PROCEDURE — 3077F PR MOST RECENT SYSTOLIC BLOOD PRESSURE >= 140 MM HG: ICD-10-PCS | Mod: CPTII,,, | Performed by: INTERNAL MEDICINE

## 2022-04-11 PROCEDURE — 99999 PR PBB SHADOW E&M-EST. PATIENT-LVL III: CPT | Mod: PBBFAC,,, | Performed by: INTERNAL MEDICINE

## 2022-04-11 PROCEDURE — 1160F PR REVIEW ALL MEDS BY PRESCRIBER/CLIN PHARMACIST DOCUMENTED: ICD-10-PCS | Mod: CPTII,,, | Performed by: INTERNAL MEDICINE

## 2022-04-11 PROCEDURE — 99999 PR PBB SHADOW E&M-EST. PATIENT-LVL III: ICD-10-PCS | Mod: PBBFAC,,, | Performed by: INTERNAL MEDICINE

## 2022-04-11 PROCEDURE — 99214 PR OFFICE/OUTPT VISIT, EST, LEVL IV, 30-39 MIN: ICD-10-PCS | Mod: S$PBB,,, | Performed by: INTERNAL MEDICINE

## 2022-04-11 RX ORDER — CHOLECALCIFEROL (VITAMIN D3) 50 MCG
1 TABLET ORAL DAILY
COMMUNITY
Start: 2021-12-29 | End: 2022-06-09 | Stop reason: ALTCHOICE

## 2022-04-11 RX ORDER — PANTOPRAZOLE SODIUM 40 MG/1
40 TABLET, DELAYED RELEASE ORAL DAILY
Status: ON HOLD | COMMUNITY
Start: 2022-03-28 | End: 2022-08-16 | Stop reason: SDUPTHER

## 2022-04-11 RX ORDER — LANOLIN ALCOHOL/MO/W.PET/CERES
1 CREAM (GRAM) TOPICAL 2 TIMES DAILY
COMMUNITY
Start: 2022-03-09

## 2022-04-11 RX ORDER — THIAMINE HCL 50 MG
50 TABLET ORAL DAILY
COMMUNITY
Start: 2021-10-21

## 2022-04-11 RX ORDER — ERGOCALCIFEROL 1.25 MG/1
50000 CAPSULE ORAL
COMMUNITY
Start: 2022-03-29 | End: 2022-04-11

## 2022-04-11 RX ORDER — POTASSIUM CHLORIDE 1500 MG/1
20 TABLET, EXTENDED RELEASE ORAL DAILY
COMMUNITY
Start: 2022-03-09

## 2022-04-11 NOTE — PROGRESS NOTES
"  Subjective:     Yong Townsend is here for evaluation of cirrhosis    History of Present Illness:    Since Yong Townsend's last visit, Says he had some wine for [place] and before that he does not remember when he had ETOH. Says he "cut down a lot" since dx of cirrhosis but was told by [facility] that he can have "wine or beer" occasionally, so he had it for [place] but didn't drink any for New Year. He reports undergoing detox, attended ETOH rehab in the last 5 yrs stayed sober for a long time and resumed only few drinks occasionally and he considers it was successful. He does not believe in AA says it did not help him. He is interested in IOP program as recommended by transplant program. He admits to smoking weed, says with severe anxiety/PTSD he tried multiple prescription medication and learned none of them help him as much as marijuana does. He informs me that marijuana is legalized now in [place]. He reportedly had GI bleed in the past with EGD showing EV with variceal banding. Denies episodes of ascites, confusion.    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     Interval history: 4/11/22    Came to [place] for follow-up last night.  Denies episodes of vomiting blood, black stool, maroon stools or bright red blood per rectum COVID denies episodes of confusion, abdominal swelling or lower extremity edema could assist he does not remember when was the last time he had alcohol.      Review of Systems   Constitutional: Negative for fatigue and fever.   Eyes: Negative for visual disturbance.   Gastrointestinal: Negative for abdominal distention, abdominal pain and nausea.   Neurological: Negative for tremors.       Objective:     Physical Exam  Constitutional:       General: He is not in acute distress.     Appearance: Normal appearance. He is ill-appearing.   Eyes:      " General: Scleral icterus present.   Abdominal:      General: Bowel sounds are normal. There is no distension.      Palpations: Abdomen is soft.      Tenderness: There is no abdominal tenderness.   Skin:     Capillary Refill: Capillary refill takes more than 3 seconds.      Coloration: Skin is not jaundiced.      Findings: No bruising or erythema.   Neurological:      Mental Status: He is alert.   Psychiatric:         Thought Content: Thought content normal.         Judgment: Judgment normal.         MELD-Na score: 20 at 1/10/2022  1:01 PM  MELD score: 19 at 1/10/2022  1:01 PM  Calculated from:  Serum Creatinine: 0.7 mg/dL (Using min of 1 mg/dL) at 1/10/2022  1:01 PM  Serum Sodium: 136 mmol/L at 1/10/2022  1:01 PM  Total Bilirubin: 7.5 mg/dL at 1/10/2022  1:01 PM  INR(ratio): 1.5 at 1/10/2022  1:01 PM  Age: 56 years    WBC   Date Value Ref Range Status   01/10/2022 3.09 (L) 3.90 - 12.70 K/uL Final     Hemoglobin   Date Value Ref Range Status   01/10/2022 8.8 (L) 14.0 - 18.0 g/dL Final     Hematocrit   Date Value Ref Range Status   01/10/2022 26.9 (L) 40.0 - 54.0 % Final     Platelets   Date Value Ref Range Status   01/10/2022 40 (L) 150 - 450 K/uL Final     BUN   Date Value Ref Range Status   01/10/2022 8 6 - 20 mg/dL Final     Creatinine   Date Value Ref Range Status   01/10/2022 0.7 0.5 - 1.4 mg/dL Final     Glucose   Date Value Ref Range Status   01/10/2022 89 70 - 110 mg/dL Final     Calcium   Date Value Ref Range Status   01/10/2022 8.5 (L) 8.7 - 10.5 mg/dL Final     Sodium   Date Value Ref Range Status   01/10/2022 136 136 - 145 mmol/L Final     Potassium   Date Value Ref Range Status   01/10/2022 3.5 3.5 - 5.1 mmol/L Final     Chloride   Date Value Ref Range Status   01/10/2022 105 95 - 110 mmol/L Final     AST   Date Value Ref Range Status   01/10/2022 98 (H) 10 - 40 U/L Final     ALT   Date Value Ref Range Status   01/10/2022 25 10 - 44 U/L Final     Alkaline Phosphatase   Date Value Ref Range Status    01/10/2022 134 55 - 135 U/L Final     Total Bilirubin   Date Value Ref Range Status   01/10/2022 7.5 (H) 0.1 - 1.0 mg/dL Final     Comment:     For infants and newborns, interpretation of results should be based  on gestational age, weight and in agreement with clinical  observations.    Premature Infant recommended reference ranges:  Up to 24 hours.............<8.0 mg/dL  Up to 48 hours............<12.0 mg/dL  3-5 days..................<15.0 mg/dL  6-29 days.................<15.0 mg/dL       Albumin   Date Value Ref Range Status   01/10/2022 2.6 (L) 3.5 - 5.2 g/dL Final     INR   Date Value Ref Range Status   01/10/2022 1.5 (H) 0.8 - 1.2 Final     Comment:     Coumadin Therapy:  2.0 - 3.0 for INR for all indicators except mechanical heart valves  and antiphospholipid syndromes which should use 2.5 - 3.5.           Assessment/Plan:     1. Cirrhosis of liver without ascites, unspecified hepatic cirrhosis type      1.Cirrhosis-    MELD-20 (1/22)   Will repeat MELD labs and initiate for transplant evaluation if above 15  Continue with HCC surveillance- last US 9/6/21 showed no liver lesions- needs repeat   Continue variceal screening-last EGD 2021( report not available)  Endoscopy from June 2020 showed scar tissue from previous banding and small esophageal varices  Continue propranolol for secondary prevention    Had extensive discussion about his history of  ETOH intake, says he does not remember, if he gives some date or day says he could be lying as he does not remember.   Educated patient about disease process, prognosis of cirrhosis. Explained complications of cirrhosis, risks of decompensation to the extent of liver failure and death with infections, dehydration, renal failure, GI bleed etc..Educated about MELD-Na scoring system, transplant evaluation, complexity of liver transplantation, post procedure complications. Patient expressed desire to get into the list both here and in [place] where his ex-wife is a  NP and has good support.   Informed decreasing duration of wait list time by being open for living donor and hep C donor liver options.     RTC in 3 months.    Lula Burk MD  Transplant Hepatologist  Dept of Hepatology, [place] [facility] [place]

## 2022-04-12 ENCOUNTER — TELEPHONE (OUTPATIENT)
Dept: HEPATOLOGY | Facility: CLINIC | Age: 57
End: 2022-04-12
Payer: MEDICAID

## 2022-04-12 DIAGNOSIS — K70.30 ALCOHOLIC CIRRHOSIS OF LIVER WITHOUT ASCITES: Primary | ICD-10-CM

## 2022-04-13 ENCOUNTER — TELEPHONE (OUTPATIENT)
Dept: TRANSPLANT | Facility: CLINIC | Age: 57
End: 2022-04-13

## 2022-04-13 NOTE — TELEPHONE ENCOUNTER
Referring DR Burk  Initial  referral from Dr. Lin  Alcohol cirrhosis with MELD 20  ICD-10:  K74.60  Referred for liver transplant for EVALUATION.    Referral completed and forwarded to [facility].          Insurance: Epic   PRIMARY:   ID:  Contact #     SECONDARY:   ID:  Contact #

## 2022-04-26 ENCOUNTER — TELEPHONE (OUTPATIENT)
Dept: TRANSPLANT | Facility: CLINIC | Age: 57
End: 2022-04-26
Payer: MEDICAID

## 2022-04-29 ENCOUNTER — TELEPHONE (OUTPATIENT)
Dept: TRANSPLANT | Facility: CLINIC | Age: 57
End: 2022-04-29
Payer: MEDICAID

## 2022-04-29 NOTE — TELEPHONE ENCOUNTER
Called patient to introduce myself and discuss OLT evaluation dates that are available.  Patient states [facility] with [facility] and would like me to call back in 10 minutes.    Called back about 15 minutes later. No answer. No voicemail set up.     Will try to call back later today.

## 2022-04-29 NOTE — TELEPHONE ENCOUNTER
Called pt to discuss [facility] liver transplant evaluation.  Informed patient  that evaluation will take approx  2 to 3 days to complete, depending on rather additional consults, such as colonoscopy.  Informed him that all testing will be performed outpatient.  Patient notified that some testing may be completed outside.  Patient informed of which tests that can be scheduled locally.  Patient voiced understanding of the need to have a caregiver present for the  and surgeon consult, as well as for the patient education.    All questions/ concerns regarding liver transplant evaluation answered/ addressed.    Patient picked [facility] dates of May 26th and 27th.

## 2022-05-17 ENCOUNTER — TELEPHONE (OUTPATIENT)
Dept: TRANSPLANT | Facility: CLINIC | Age: 57
End: 2022-05-17
Payer: MEDICAID

## 2022-05-17 DIAGNOSIS — Z76.82 ORGAN TRANSPLANT CANDIDATE: ICD-10-CM

## 2022-05-17 DIAGNOSIS — K70.30 ALCOHOLIC CIRRHOSIS OF LIVER WITHOUT ASCITES: Primary | ICD-10-CM

## 2022-05-17 NOTE — TELEPHONE ENCOUNTER
Patient called and changed his [facility] dates to June 9th and 10th.    He is also trying to track down a copy of his colonoscopy. If he can't find where this was done, he will schedule a colonoscopy with his local GI doctor.

## 2022-05-21 ENCOUNTER — TELEPHONE (OUTPATIENT)
Dept: TRANSPLANT | Facility: CLINIC | Age: 57
End: 2022-05-21
Payer: MEDICAID

## 2022-05-21 DIAGNOSIS — Z76.82 ORGAN TRANSPLANT CANDIDATE: Primary | ICD-10-CM

## 2022-05-31 ENCOUNTER — TELEPHONE (OUTPATIENT)
Dept: TRANSPLANT | Facility: CLINIC | Age: 57
End: 2022-05-31
Payer: MEDICAID

## 2022-05-31 NOTE — TELEPHONE ENCOUNTER
Reminded patient that his OLT evaluation was next week June 9th and 10th.  Reminded patient that he needs to bring a caregiver.  Patient states he will be here for evaluation next week.   Patient also states that he will get his EGD and colonoscopy scheduled.

## 2022-06-08 ENCOUNTER — TELEPHONE (OUTPATIENT)
Dept: TRANSPLANT | Facility: CLINIC | Age: 57
End: 2022-06-08
Payer: OTHER GOVERNMENT

## 2022-06-08 NOTE — TELEPHONE ENCOUNTER
Patient called to confirm appointments for [facility].  Patient reports that he had his EDG done with banding of varices.   Patient reports that he is in a great deal of pain and has nausea really bad.   Patient states that the 2.5 hour ride would be too much for him and he would like to reschedule.

## 2022-06-09 ENCOUNTER — HOSPITAL ENCOUNTER (OUTPATIENT)
Dept: RADIOLOGY | Facility: HOSPITAL | Age: 57
Discharge: HOME | End: 2022-06-09
Attending: INTERNAL MEDICINE
Payer: OTHER GOVERNMENT

## 2022-06-09 ENCOUNTER — EVALUATION (OUTPATIENT)
Dept: TRANSPLANT | Facility: CLINIC | Age: 57
End: 2022-06-09
Payer: MEDICAID

## 2022-06-09 ENCOUNTER — CLINICAL SUPPORT (OUTPATIENT)
Dept: TRANSPLANT | Facility: CLINIC | Age: 57
End: 2022-06-09
Payer: MEDICAID

## 2022-06-09 VITALS
HEIGHT: 71 IN | DIASTOLIC BLOOD PRESSURE: 76 MMHG | TEMPERATURE: 97 F | TEMPERATURE: 97 F | DIASTOLIC BLOOD PRESSURE: 76 MMHG | HEART RATE: 66 BPM | OXYGEN SATURATION: 95 % | BODY MASS INDEX: 25.28 KG/M2 | RESPIRATION RATE: 16 BRPM | HEART RATE: 66 BPM | RESPIRATION RATE: 16 BRPM | BODY MASS INDEX: 25.28 KG/M2 | HEIGHT: 71 IN | SYSTOLIC BLOOD PRESSURE: 130 MMHG | OXYGEN SATURATION: 95 % | SYSTOLIC BLOOD PRESSURE: 130 MMHG | WEIGHT: 180.56 LBS | WEIGHT: 180.56 LBS

## 2022-06-09 DIAGNOSIS — Z01.818 PRE-TRANSPLANT EVALUATION FOR LIVER TRANSPLANT: Primary | ICD-10-CM

## 2022-06-09 DIAGNOSIS — Z76.82 ORGAN TRANSPLANT CANDIDATE: ICD-10-CM

## 2022-06-09 PROCEDURE — 99205 OFFICE O/P NEW HI 60 MIN: CPT | Mod: S$PBB,TXP,, | Performed by: TRANSPLANT SURGERY

## 2022-06-09 PROCEDURE — 99213 OFFICE O/P EST LOW 20 MIN: CPT | Mod: PBBFAC,TXP,25

## 2022-06-09 PROCEDURE — 99213 OFFICE O/P EST LOW 20 MIN: CPT | Mod: PBBFAC,25,27,TXP | Performed by: TRANSPLANT SURGERY

## 2022-06-09 PROCEDURE — 99999 PR PBB SHADOW E&M-EST. PATIENT-LVL III: ICD-10-PCS | Mod: PBBFAC,TXP,,

## 2022-06-09 PROCEDURE — 93975 VASCULAR STUDY: CPT | Mod: 26,TXP,, | Performed by: INTERNAL MEDICINE

## 2022-06-09 PROCEDURE — 97802 MEDICAL NUTRITION INDIV IN: CPT | Mod: PBBFAC,TXP | Performed by: DIETITIAN, REGISTERED

## 2022-06-09 PROCEDURE — 99999 PR PBB SHADOW E&M-EST. PATIENT-LVL III: CPT | Mod: PBBFAC,TXP,,

## 2022-06-09 PROCEDURE — 93975 VASCULAR STUDY: CPT | Mod: TC,TXP

## 2022-06-09 PROCEDURE — 99205 PR OFFICE/OUTPT VISIT, NEW, LEVL V, 60-74 MIN: ICD-10-PCS | Mod: S$PBB,TXP,, | Performed by: TRANSPLANT SURGERY

## 2022-06-09 PROCEDURE — 99999 PR PBB SHADOW E&M-EST. PATIENT-LVL III: ICD-10-PCS | Mod: PBBFAC,TXP,, | Performed by: TRANSPLANT SURGERY

## 2022-06-09 PROCEDURE — 76700 US ABDOMEN COMP WITH DOPPLER_PRE LIVER TRANSPLANT: ICD-10-PCS | Mod: 26,TXP,, | Performed by: INTERNAL MEDICINE

## 2022-06-09 PROCEDURE — 99999 PR PBB SHADOW E&M-EST. PATIENT-LVL III: CPT | Mod: PBBFAC,TXP,, | Performed by: TRANSPLANT SURGERY

## 2022-06-09 PROCEDURE — 93975 US ABDOMEN COMP WITH DOPPLER_PRE LIVER TRANSPLANT: ICD-10-PCS | Mod: 26,TXP,, | Performed by: INTERNAL MEDICINE

## 2022-06-09 PROCEDURE — 76700 US EXAM ABDOM COMPLETE: CPT | Mod: 26,TXP,, | Performed by: INTERNAL MEDICINE

## 2022-06-09 RX ORDER — ERGOCALCIFEROL 1.25 MG/1
50000 CAPSULE ORAL
COMMUNITY
Start: 2022-05-27

## 2022-06-09 NOTE — PROGRESS NOTES
Patient seen in clinic.  Consents reviewed and signatures obtained.   Patient given supplies needed to obtain urine specimen.    Patient's name and date of birth verified.   Instructions reviewed with the patient on the way the specimen should be obtained.   Patient stated that  they understood the information provided for the collection and  placement of the specimen. Specimen collected in clinic.  Patient given supplies and printed instructions for the collection of the 24 hour urine specimen.  Patient reports understanding the instructions provided to obtain the specimen and the storage of the specimen while at home.  Patient given instructed to bring the container to 2nd floor lab when the collection is completed.  Patient contact information verified.  Patient questions answered and concerns addressed.

## 2022-06-09 NOTE — PROGRESS NOTES
Transplant Surgery  Liver Transplant Recipient Evaluation     Referring Provider: Mauro Lin     Subjective:      Reason for Visit: evaluation for liver transplant     History of Present Illness: Yong Townsend is a 57 y.o. male who is being evaluated for liver transplant due to Alcoholic Cirrhosis. Yong reports edema, esophageal or gastric varices, jaundice, muscle wasting, portal hypertension, thrombocytopenia and variceal bleeding.     External provider notes reviewed: Yes     Review of Systems   Constitutional: Negative.    Respiratory: Negative.    Cardiovascular: Negative.    Gastrointestinal: Negative.    Genitourinary: Negative.       Objective:      Physical Exam  Constitutional:       Appearance: Normal appearance.   Cardiovascular:      Rate and Rhythm: Normal rate and regular rhythm.      Pulses: Normal pulses.      Heart sounds: Normal heart sounds.   Pulmonary:      Effort: Pulmonary effort is normal.      Breath sounds: Normal breath sounds.   Abdominal:      General: There is no distension.      Palpations: Abdomen is soft. There is no mass.      Tenderness: There is no abdominal tenderness.      Hernia: No hernia is present.   Skin:     General: Skin is warm and dry.   Neurological:      General: No focal deficit present.      Mental Status: He is alert.   Psychiatric:         Mood and Affect: Mood normal.         Behavior: Behavior normal.            MELD-Na score: 18 at 6/10/2022 10:08 AM  MELD score: 17 at 6/10/2022 10:08 AM  Calculated from:  Serum Creatinine: 0.9 mg/dL (Using min of 1 mg/dL) at 6/10/2022 10:08 AM  Serum Sodium: 136 mmol/L at 6/9/2022  9:06 AM  Total Bilirubin: 6.1 mg/dL at 6/9/2022  9:06 AM  INR(ratio): 1.4 at 6/9/2022  9:06 AM  Age: 57 years     Diagnostics:  The following labs have been reviewed: CBC  CMP  PT  INR  The following radiology images have been independently reviewed and interpreted: Abdominal US  CT Abd/Pelvis     Diagnoses:  1. Organ transplant candidate           Transplant Surgery - Candidacy   Assessment/Plan:      Transplant Candidacy: Yong Townsend is a 57 y.o. male with ESLD secondary to Alcoholic Cirrhosis here for evaluation for possible OLT.  Based on available information, Yong is a suitable liver transplant candidate.  He will be presented to selection committee after all tests and evaluations are complete.     Additional testing to be completed according to Liver : Written Order Guideline for Adult Liver Transplant Evaluation (LI-02)     Interpretation of tests and discussion of patient management involves all members of the multidisciplinary transplant team.     Abdominal/Body Habitus: No concern     Ascites: None     SBP History: None reported     Portal vein: No evidence of PVT on US (6/10/22).  No recent cross sectional imaging.     Abdominal Surgery: None     Other surgical considerations:  None              Javid Franco MD        [facility] Patient Status  Functional Status: 60% - Requires occasional assistance but is able to care for needs  Physical Capacity: No Limitations     Counseling: I discussed with Yong the benefits of liver transplantation.  We discussed the evaluation and listing procedures.  We discussed the MELD system and the associated waiting times.  We discussed national and center specific survival results.  We discussed the option of being multiply listed in different OPOs.  We discussed the option of living donation versus  donor transplantation and the advantages and relative disadvantages of each.   We discussed the risks, benefits and potential complications related to surgery including the risks related to anesthesia, bleeding, infection, primary non-function of the allograft, the risk of reoperation as well as the risk of death.  We discussed the typical post-operative course, length of hospitalization, the long-term use of immunosuppressive therapy as well as the need for long-term routine follow-up.     Patient  advised that it is recommended that all transplant candidates, and their close contacts and household members receive Covid vaccination.     Coronavirus disease (COVID-19) caused by severe acute respiratory virus coronavirus 2 (SARS-C0V 2) is associated with increased mortality in solid organ transplant recipients (SOT) compared to non-transplant patients. Vaccine responses to vaccination are depressed against SARS-CoV2 compared to normal individuals but improve with third vaccination doses. Vaccination prior to SOT provides both the best opportunity for transplant candidates to develop protective immunity and to reduce the risk of serious COVID19 infections post transplantation. Organ transplant candidates at [facility] will be required to receive SARS-CoV-2 vaccination prior to being listed with a an active status, whenever possible. Exceptions will be made for disability related reasons or for sincerely held Jainism beliefs.      PHS: I discussed the use of organs from donors with PHS risk criteria, including the testing protocols utilized, as well as data from the literature regarding the likelihood of transmission of hepatitis or HIV.  The patient is willing to consider such grafts.  DCD: I discussed the use of organs recovered by donation after cardiac death (DCD), including slightly decreased graft survival and greater risk of arterial and biliary complications. The potential advantage to the recipient is possibly receiving a transplant sooner by accepting such an organ. The patient is willing to consider such grafts.  HBcAb: I discussed the use of organs from donors with HBcAb in conjunction with long term use of HBV antiviral drugs, such as lamivudine. The small but measurable risk of hepatitis B seroconversion was discussed as well as the potentially life long need to continue antiviral drugs. The patient is willing to consider such grafts.  HCV Non-viremic recipient:  I discussed the use of HCV-positive organs in naive recipients, including the risk of viral transmission to the patients or others, potential insurance barriers for antiviral medication coverage, risk for fibrosing cholestatic hepatitis, death or graft loss. The potential advantage to the recipient is the possibility of receiving a transplant sooner with decreased mortality risk by accepting such an organ. The patient is willing to consider such grafts.  LDLT: I discussed the nature of living donor liver transplant, including donor risks and more frequent recipient complications. The patient is willing to consider such grafts.

## 2022-06-09 NOTE — PROGRESS NOTES
"TRANSPLANT NUTRITIONAL ASSESSMENT    Referring Provider: Lula Burk MD    Reason for Visit: Pre-liver transplant work-up    Age: 57 y.o.  Sex: male    Patient Active Problem List   Diagnosis    Alcoholic cirrhosis of liver without ascites    Secondary esophageal varices without bleeding    Alcohol use     Past Medical History:   Diagnosis Date    Alcoholic cirrhosis     Esophageal varices      Lab Results   Component Value Date    GLU 91 06/09/2022    K 3.2 (L) 06/09/2022    PHOS 4.0 04/22/2018    MG 2.1 04/22/2018    CHOL 257 02/18/2022    HDL 51 02/18/2022    .00 02/18/2022    TRIG 122 02/18/2022    ALBUMIN 3.0 (L) 06/09/2022    HGBA1C 4.8 06/09/2022    CALCIUM 8.9 06/09/2022     Other Pertinent Labs: none    Current Outpatient Medications   Medication Sig    ergocalciferol (ERGOCALCIFEROL) 50,000 unit Cap Take 50,000 Units by mouth every 7 days.    folic acid (FOLVITE) 1 MG tablet Take 1 tablet (1,000 mcg total) by mouth every morning.    magnesium oxide (MAG-OX) 400 mg (241.3 mg magnesium) tablet Take 1 tablet by mouth 2 (two) times daily.    pantoprazole (PROTONIX) 40 MG tablet Take 40 mg by mouth once daily.    potassium chloride (K-TAB) 20 mEq Take 20 mEq by mouth once daily.    propranoloL (INDERAL) 20 MG tablet Take 20 mg by mouth 2 (two) times daily.    thiamine (VITAMIN B-1) 50 MG tablet Take 50 mg by mouth once daily.     No current facility-administered medications for this visit.     Allergies: Patient has no known allergies.    Ht Readings from Last 1 Encounters:   06/09/22 5' 11" (1.803 m)     Wt Readings from Last 1 Encounters:   06/09/22 81.9 kg (180 lb 8.9 oz)      BMI: Body mass index is 25.18 kg/m².    Usual Weight: 180-190 lb  Weight Change/Time: weight has been stable  Current Diet: regular  Appetite/Current Intake: good   Exercise/Physical Activity: enjoys running, walking, yoga when available  Nutritional/Herbal Supplements: Vit K, mg, potassium, folic acid, Equate or " other nutrition shake 1-2 in a day, not consistently/daily  Potential Food/Medication Interactions: reviewed  Chewing/Swallowing Problems: none  Symptoms: none  Assessment of Lab Values: K 3.2, Alb 3.0  Support System: none present, pt shops/cooks for himself/family    Estimated Kcal Need: 0131-9050 kcal (25-30 kcal/kg)  Estimated Protein Need:  gm (1-1.5 gm/kg)    Nutritional History:   Pt reports to have a good appetite, no n/v/d/abd pain with eating. He prefers to eat small meals/snacks, not feel excessively full after meals. Pt may have 5-6 snacks/meals some days or 2 meals another day. Pt has outside food, take out/fast food about 1 x/ week, not consistently. Pt enjoys cooking, will eat 1 or 2 items from the meals he cooks. He reported the following diet recall:  B: 2-3 scrambled or boiled eggs, maybe with spinach, watermelon or grapes, water or juice (drinks after finishes eating), occasional hill or breakfast sausage  Snack: salted cashews, fruit, yogurt +granola +fruit  L: skip  D: fish/shrimp, enjoys sushi/sashimi/poke bowl, less often chicken or pork or steak, rice or pasta, lasagna, thin crust pizza w/ pepperoni & italian sausage, broccoli/carrots/spinach/mustard greens, beans & rice, homemade soups, maybe mashed potatoes & gravy  Beverages: water, juices, powerade       Nutritional Diagnoses  Problem: food- and nutrition-related knowledge deficit  Etiology: r/t no prior ed on pre liver transplant nutrition recommendations  Symptoms: aeb diet recall    Educational Need? yes  Barriers: none identified  Discussed with: patient  Interventions: Patient taught nutrition information regarding Pre-liver transplant work-up.    Pre liver transplant nutrition packet provided and discussed. Pt advised on the recommendations to follow a low sodium diet while taking in adequate protein.  This packet includes label low sodium reading tips, seasoning suggestions, protein intake goal amount (gm) for the individual  patient, as well as oral supplement suggestions.   Exercise and physical activity are encouraged.    Goals/Recommendations: diet adherence, small frequent meals and snacks and choose healthy options when dining out  Actions Taken: instruct/provide written information  Strategies Used: problem solving, goal setting, motivational interviewing  Patient and/or family comprehend instructions: yes , adherence expected  Outcome: Verbalizes understanding  Monitoring: Contact information provided, will f/u in clinic and communicate with the care team as needed.     Counseling Time: 15 minutes

## 2022-06-09 NOTE — PROGRESS NOTES
PHARM.D. PRE-TRANSPLANT NOTE:    This patient's medication therapy was evaluated as part of his pre-transplant evaluation.      The following general pharmacologic concerns were noted: none    The following concerns for post-operative pain management were noted: none    The following pharmacologic concerns related to HCV therapy were noted: none      This patient's medication profile was reviewed for considerations for DAA Hepatitis C therapy:    [x]  No current inducers of CYP 3A4 or PGP  [x]  No amiodarone on this patient's EMR profile in the last 24 months  [x]  No past or current atrial fibrillation on this patient's EMR profile       Current Outpatient Medications   Medication Sig Dispense Refill    ergocalciferol (ERGOCALCIFEROL) 50,000 unit Cap Take 50,000 Units by mouth every 7 days.      folic acid (FOLVITE) 1 MG tablet Take 1 tablet (1,000 mcg total) by mouth every morning. 30 tablet 11    magnesium oxide (MAG-OX) 400 mg (241.3 mg magnesium) tablet Take 1 tablet by mouth 2 (two) times daily.      pantoprazole (PROTONIX) 40 MG tablet Take 40 mg by mouth once daily.      potassium chloride (K-TAB) 20 mEq Take 20 mEq by mouth once daily.      propranoloL (INDERAL) 20 MG tablet Take 20 mg by mouth 2 (two) times daily.      thiamine (VITAMIN B-1) 50 MG tablet Take 50 mg by mouth once daily.       No current facility-administered medications for this visit.           I am available for consultation and can be contacted, as needed by the other members of the Transplant team.

## 2022-06-09 NOTE — PROGRESS NOTES
Transplant Recipient Adult Psychosocial Assessment     Patient presented to clinic alone without a caregiver present.     Yong Cary MONTERO [address]  [place] [address]  Telephone Information:   Mobile [phone]   Home  [phone] (home)  Work  There is no work phone number on file.  E-mail  [email or web address]    Sex: male  YOB: 1965  Age: 57 y.o.    Encounter Date: 6/9/2022  U[place] Citizen: yes  Primary Language: English   Needed: no    Emergency Contact:  Name: Macie Campbell  Relationship: friend  Address: [address]  Phone Numbers:  [phone] (mobile)    Family/Social Support:   Number of dependents/: Patient has an 10 yo son that he does not provide care for. Pt's 10 yo son lives in [place].   Marital history: Pt has 1 previous marriage. Pt has been  for several years.   Other family dynamics: Patient's parents are still living and he lives in the home with them. Patient has 1 sister that lives in [place], [place]. Patient has 2 adult children (18, 23) that live in [place], [place]. Patient's 10 yo son lives in [place]. Patient states that he had a 27 year old daughter that was murdered in [place] several years ago. Patient has a few friends that live near by that may be able to assist for transplant.     Household Composition:  Name: Yong Townsend   Age: 57  Relationship: patient  Does person drive? yes    Name: Yong Townsend   Age: 82  Relationship: father  Does person drive? yes    Name: Niyah Cary   Age: 82  Relationship: mother  Does person drive? yes    Do you and your caregivers have access to reliable transportation? yes  PRIMARY CAREGIVER: Patient reports: Macie Campbell, pt's friend, will be the patient's primary caregiver. Ph: [phone] *Unconfirmed*     Patient presented without a caregiver present. Pt states his caregiver was sleeping in the hotel and did not come with him for his appointments today. SW requested patient to  bring his caregiver to his appointments tomorrow and to call SW upon arrival so his caregiver can meet with a SW in person. If patient does not present with a caregiver, a later appointment will need to be scheduled and he will need to present with a caregiver.     provided in-depth information to patient and caregiver regarding pre- and post-transplant caregiver role.   strongly encourages patient and caregiver to have concrete plan regarding post-transplant care giving, including back-up caregiver(s) to ensure care giving needs are met as needed.    Patient states understanding all aspects of caregiver role/commitment and is able/willing/committed to being caregiver to the fullest extent necessary.    Patient verbalizes understanding of the education provided today and caregiver responsibilities.         remains available. Patient agree to contact  in a timely manner if concerns arise.      Able to take time off work without financial concerns: yes. - retired      Additional Significant Others who may be willing to assist with Transplant:  Name: Jey Lombardo   Ph: [phone]  *unconfirmed* SW called and left  on 6/9/22   City: [place] State: [place]  Relationship: friend  Does person drive? yes     Name: Maximiliano Tucker   Ph: [phone]  City: [place] State: [place]  Relationship: friend  Does person drive? yes     Name: Cpirianozakia Ramseyian  Ph: [phone]  City: [place] / [place] State: [place]  Relationship: friend  Does person drive? yes     Name: Niyah Townsend   Ph: [phone]  City: [place] State: [place]  Relationship: mother  Does person drive? yes     Name: Fiona Lee (nurse) Ph: [phone]  State: [place]  Relationship: mother of pt's children  Does person drive? yes     Living Will: no  Healthcare Power of : no  Advance Directives on file: <<no information> per medical record.  Verbally reviewed LW/HCPA information.   provided patient  with copy of LW/HCPA documents and provided education on completion of forms.    Living Donors: N/A    Highest Education Level: Associate/Bachelor Degree  Reading Ability: college  Reports difficulty with: N/A  Learns Best By:  Verbal instructions      Status: yes: [place]ymber[place], date:   VA Benefits: yes     Working for Income: No  If no, reason not working: Disability  Patient is disabled since being in the [employer]. 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     Spouse/Significant Other Employment: n/a    Disabled: yes    Monthly Income:  VA Benefits: $1240 per month  Able to afford all costs now and if transplanted, including medications: yes  Patient verbalizes understanding of personal responsibilities related to transplant costs and the importance of having a financial plan to ensure that patients transplant costs are fully covered.      provided fundraising information/education.  Patient verbalizes understanding.   remains available.    Insurance:   Payer/Plan Subscr  Sex Relation Sub. Ins. ID Effective Group Num   1. MEDICAID - AE* RENALDO NAVARRO 1965 Male Self [id number]* [id number]                                    [address] [address], [place] [address]     Primary Insurance (for UNOS reporting): Public Insurance - Medicaid  Secondary Insurance (for UNOS reporting): None  Patient verbalizes clear understanding that patient may experience difficulty obtaining and/or be denied insurance coverage post-surgery. This includes and is not limited to disability insurance, life insurance, health insurance, burial insurance, long term care insurance, and other insurances.    Patient also reports understanding that future health concerns related to or unrelated to transplantation may not be covered by patient's insurance.  Resources and information provided and reviewed.      Patient provides verbal permission to release any necessary  "information to outside resources for patient care and discharge planning.  Resources and information provided are reviewed.      Dialysis Adherence:  Patient reports no.     Infusion Service: patient utilizing? no  Home Health: patient utilizing? no  DME: no  Pulmonary/Cardiac Rehab: no   ADLS:  Patient is independent in the home and does require any assistance.    Adherence:  Patient is compliant with his medications and medical appointments.  Adherence education and counseling provided.     Per History Section:  Past Medical History:   Diagnosis Date    Alcoholic cirrhosis     Esophageal varices      Social History     Tobacco Use    Smoking status: Never Smoker    Smokeless tobacco: Never Used   Substance Use Topics    Alcohol use: Not on file     Social History     Substance and Sexual Activity   Drug Use Not on file     Social History     Substance and Sexual Activity   Sexual Activity Not on file       Per Today's Psychosocial:  Tobacco: none, patient denies any use.     Alcohol: When asked about ETOH use, patient states "that is why I'm here." Patient is a poor historian with his alcohol use history. Patient reports his last use was around Christmas, 2021. Patient states he discontinued prior to that time, but had a slip up on [place] Day, 2021. Patient states that he just had one drink and then instantly regretted it. Patient states he completed an inpatient program with [facility] in [place], [place] several years ago. Patient states he left the program days before graduating because they wanted him to write 40 pages for bad behavior. Patient states that when he was drinking he was drinking whiskey drinks and dark beers daily. When asked about the amount he would drink he stated "That is how I know you're not a true alcoholic. Real alcoholics don't count the amount of drinks, they just drink until the get drunk. A real alcoholic drinks until there are no drinks left on the table." Patient did endorse " "daily drinking for several years. Patient states that he would drink as a coping mechanism for dealing with the murder of his daughter,  related PTSD, and other traumatic past events. Patient states he has quit completely 4 times but then would have certain triggers related to his past. Patient states his drinking increased when he returned to [place], [place] a few years after his daughter was murdered. Patient states he still drinks nonalcoholic beers and tried drinking nonalcoholic whiskey, but did not enjoy it. SW provided education and strongly recommended against "nonalcoholic" advertised beverages because some of them have a small percentage of alcohol in them and they can easily be a gateway to normal alcohol consumption. Patient expressed understanding however stated "I know which ones of them have alcohol in them and which ones do not." Pt states he has done a detox program and inpatient (3 weeks) substance abuse treatment program several years ago. Patient denies any IOP or outpatient treatment in the past. Patient states he has tried AA meetings but felt like they were not beneficial because the members in the group would still drink. Patient states he also does not agree with how AA uses the "days sober" system with sobriety chips and stated "I do not keep up with the day I started being sober because slip ups are expected." However, patient is receptive towards formal outpatient substance abuse treatment resources. Pt expressed interest with wanting to enroll into an IOP.    Illicit Drugs/Non-prescribed Medications: Patient states he was smoking marijuana but then stopped years ago. Patient states he is used to living where marijuana is legal.    Patient states clear understanding of the potential impact of substance use as it relates to transplant candidacy and is aware of possible random substance screening.  Substance abstinence/cessation counseling, education and resources provided and " "reviewed.     Arrests/DWI/Treatment/Rehab: Patient denies legal trouble. Patient states he attended an inpatient substance abuse [facility] in [place] [place] [facility] "several years ago", to which he did not complete.     Psychiatric History:    Mental Health: Pt states he was diagnosed with PTSD following his time in the  with [employer].   Psychiatrist/Counselor: Pt states he has always seen a counselor following his time in . Pt currently has a counselor that he sees monthly in [place], [place].   Medications:  Pt denies. Pt states he takes medication for insomnia. Pt states since his time in the , he does not like to sleep in the dark.   Suicide/Homicide Issues: Pt denies any SI/HI past or present. "I love my life."  Safety at home: Patient lives in a safe environment at home.    Knowledge: Patient states having clear understanding and realistic expectations regarding the potential risks and potential benefits of organ transplantation and organ donation, agrees to discuss with health care team members and support system members and to utilize available resources and express questions and/or concerns in order to further facilitate the pt informed decision-making.  Resources and information provided and reviewed.     Patient is aware of [facility]'s affiliation and/or partnership with agencies in home health care, LTAC, SNF, [facility] and other hospitals and clinics.    Understanding: Patient reports having a clear understanding of the many lifetime commitments involved with being a transplant recipient, including costs, compliance, medications, lab work, procedures, appointments, concrete and financial planning, preparedness, timely and appropriate communication of concerns, abstinence (ETOH, tobacco, illicit non-prescribed drugs), adherence to all health care team recommendations, support system and caregiver involvement, appropriate and timely resource utilization and follow-through, " "mental health counseling as needed/recommended, and patient and caregiver responsibilities.  Social Service Handbook, resources and detailed educational information provided and reviewed.  Educational information provided.    Patient also reports current and expected compliance with health care regime and states having a clear understanding of the importance of compliance.      Patient reports a clear understanding that risks and benefits may be involved with organ transplantation and with organ donation.      Patient also reports clear understanding that psychosocial risk factors may affect patient, and include but are not limited to feelings of depression, generalized anxiety, anxiety regarding dependence on others, post traumatic stress disorder, feelings of guilt and other emotional and/or mental concerns, and/or exacerbation of existing mental health concerns.  Detailed resources provided and discussed.     Patient agrees to access appropriate resources in a timely manner as needed and/or as recommended, and to communicate concerns appropriately.  Patient also reports a clear understanding of treatment options available.      reviewed education, provided additional information, and answered questions.    Feelings or Concerns: "I try not to spend much time thinking about it" Patient denies any concerns regarding transplant.    Coping: Identify Patient & Caregiver Strategies to Clune:   1. Currently & Pre-transplant - Pt states he enjoys spending time with friends and family. Patient describes there being limited things to do in his home town.   2. At the time of surgery - Pt will utilize support from his friends and family at the time of surgery.   3. During post-Transplant & Recovery Period - Pt will utilize support from friends/family while in recovery.    Goals: Patient would like to spend more time with his children / family.  Patient referred to Vocational Rehabilitation.    Interview Behavior: " Patient presents as alert and oriented x 4, pleasant, good eye contact, well groomed, average intelligence, calm, communicative and cooperative. Pt did appear to have a hard time focusing on the current discussion topic and was tangential and rambling in speech.       Transplant Social Work - Candidacy  Assessment/Plan:     Psychosocial Suitability: Patient presents as a high risk candidate for liver transplant at this time due to the following psychosocial risk factors: unconfirmed caregiver plan, less than a year of sobriety with a history of daily and significant alcohol abuse, and a history of mental health complications.     Patient is not a suitable candidate for transplant with out a confirmed caregiver plan. Patient will need to represent with a caregiver present at a later date.     Once patient's caregiver plan is confirmed, he will remain a high risk candidate due to less than a year of sobriety. Final determination of transplant suitability to be determined by the transplant selection committee.       Patient's protective factors includes: adequate finances and insurance, appropriately managing his PTSD with counselor visits, and an expressed willingness to engage in formal substance abuse treatment.      Recommendations/Additional Comments:     - Present to clinic with a caregiver in person to meet with a SW to review caregiver expectations. Have a secondary caregiver reach out to transplant SW team to confirm their availability to serve as a back up caregiver.     - Enroll into an outpatient formal substance abuse treatment program and sign a release of information form so transplant social work team can follow progress.    - Local Lodging with [facility]     - Adhere with any psychiatry recommendations regarding depression/anxiety medications and continue seeing counselor outpatient regularly.     - Engage in virtual/in person AA or support group meetings and document meetings attended on  the log sheets provided.      - Random and routine alcohol testing (e.g. serum alcohol, urine alcohol, PETH) to gauge and monitor sobriety.      - Fundraising to alleviate transplant related costs.      - Patient to call transplant SW with any concerns or obstacles.       Jules Rosales LMSW

## 2022-06-10 ENCOUNTER — TELEPHONE (OUTPATIENT)
Dept: TRANSPLANT | Facility: CLINIC | Age: 57
End: 2022-06-10
Payer: OTHER GOVERNMENT

## 2022-06-10 ENCOUNTER — HOSPITAL ENCOUNTER (OUTPATIENT)
Dept: CARDIOLOGY | Facility: HOSPITAL | Age: 57
Discharge: HOME | End: 2022-06-10
Attending: INTERNAL MEDICINE
Payer: OTHER GOVERNMENT

## 2022-06-10 VITALS — BODY MASS INDEX: 25.62 KG/M2 | WEIGHT: 183 LBS | HEIGHT: 71 IN

## 2022-06-10 DIAGNOSIS — Z76.82 ORGAN TRANSPLANT CANDIDATE: ICD-10-CM

## 2022-06-10 LAB
ASCENDING AORTA: 2.89 CM
AV INDEX (PROSTH): 0.76
AV MEAN GRADIENT: 3 MMHG
AV PEAK GRADIENT: 6 MMHG
AV VALVE AREA: 2.83 CM2
AV VELOCITY RATIO: 0.8
BSA FOR ECHO PROCEDURE: 2.04 M2
CV ECHO LV RWT: 0.33 CM
CV STRESS BASE HR: 72 BPM
DIASTOLIC BLOOD PRESSURE: 74 MMHG
DOP CALC AO PEAK VEL: 1.25 M/S
DOP CALC AO VTI: 27.88 CM
DOP CALC LVOT AREA: 3.7 CM2
DOP CALC LVOT DIAMETER: 2.18 CM
DOP CALC LVOT PEAK VEL: 1 M/S
DOP CALC LVOT STROKE VOLUME: 78.79 CM3
DOP CALCLVOT PEAK VEL VTI: 21.12 CM
E WAVE DECELERATION TIME: 221.65 MSEC
E/A RATIO: 1.8
E/E' RATIO: 10 M/S
ECHO LV POSTERIOR WALL: 0.88 CM (ref 0.6–1.1)
EJECTION FRACTION: 63 %
FRACTIONAL SHORTENING: 38 % (ref 28–44)
INTERVENTRICULAR SEPTUM: 0.96 CM (ref 0.6–1.1)
IVRT: 74.22 MSEC
LA MAJOR: 5.09 CM
LA MINOR: 5.85 CM
LA WIDTH: 3.56 CM
LEFT ATRIUM SIZE: 3.48 CM
LEFT ATRIUM VOLUME INDEX: 28.2 ML/M2
LEFT ATRIUM VOLUME: 57.32 CM3
LEFT INTERNAL DIMENSION IN SYSTOLE: 3.34 CM (ref 2.1–4)
LEFT VENTRICLE DIASTOLIC VOLUME INDEX: 68.67 ML/M2
LEFT VENTRICLE DIASTOLIC VOLUME: 139.39 ML
LEFT VENTRICLE MASS INDEX: 90 G/M2
LEFT VENTRICLE SYSTOLIC VOLUME INDEX: 22.4 ML/M2
LEFT VENTRICLE SYSTOLIC VOLUME: 45.45 ML
LEFT VENTRICULAR INTERNAL DIMENSION IN DIASTOLE: 5.37 CM (ref 3.5–6)
LEFT VENTRICULAR MASS: 183.6 G
LV LATERAL E/E' RATIO: 10 M/S
LV SEPTAL E/E' RATIO: 10 M/S
MV A" WAVE DURATION": 11.42 MSEC
MV PEAK A VEL: 0.61 M/S
MV PEAK E VEL: 1.1 M/S
MV STENOSIS PRESSURE HALF TIME: 64.28 MS
MV VALVE AREA P 1/2 METHOD: 3.42 CM2
OHS CV CPX 1 MINUTE RECOVERY HEART RATE: 111 BPM
OHS CV CPX 85 PERCENT MAX PREDICTED HEART RATE MALE: 139
OHS CV CPX ESTIMATED METS: 13
OHS CV CPX MAX PREDICTED HEART RATE: 163
OHS CV CPX PATIENT IS FEMALE: 0
OHS CV CPX PATIENT IS MALE: 1
OHS CV CPX PEAK DIASTOLIC BLOOD PRESSURE: 102 MMHG
OHS CV CPX PEAK HEAR RATE: 144 BPM
OHS CV CPX PEAK RATE PRESSURE PRODUCT: NORMAL
OHS CV CPX PEAK SYSTOLIC BLOOD PRESSURE: 161 MMHG
OHS CV CPX PERCENT MAX PREDICTED HEART RATE ACHIEVED: 88
OHS CV CPX RATE PRESSURE PRODUCT PRESENTING: 8784
PISA TR MAX VEL: 2.72 M/S
PULM VEIN S/D RATIO: 1.23
PV PEAK D VEL: 0.62 M/S
PV PEAK S VEL: 0.76 M/S
RA MAJOR: 4.66 CM
RA PRESSURE: 3 MMHG
RA WIDTH: 4.33 CM
RIGHT VENTRICULAR END-DIASTOLIC DIMENSION: 3.57 CM
RV TISSUE DOPPLER FREE WALL SYSTOLIC VELOCITY 1 (APICAL 4 CHAMBER VIEW): 16.44 CM/S
SINUS: 3.29 CM
STJ: 2.64 CM
STRESS ECHO POST EXERCISE DUR MIN: 7 MINUTES
STRESS ECHO POST EXERCISE DUR SEC: 47 SECONDS
SYSTOLIC BLOOD PRESSURE: 122 MMHG
TDI LATERAL: 0.11 M/S
TDI SEPTAL: 0.11 M/S
TDI: 0.11 M/S
TR MAX PG: 30 MMHG
TRICUSPID ANNULAR PLANE SYSTOLIC EXCURSION: 2.97 CM
TV REST PULMONARY ARTERY PRESSURE: 33 MMHG

## 2022-06-10 PROCEDURE — 93351 STRESS TTE COMPLETE: CPT | Mod: 26,TXP,, | Performed by: INTERNAL MEDICINE

## 2022-06-10 PROCEDURE — 93351 STRESS TTE COMPLETE: CPT | Mod: TXP

## 2022-06-10 PROCEDURE — 93320 STRESS ECHO (CUPID ONLY): ICD-10-PCS | Mod: 26,TXP,, | Performed by: INTERNAL MEDICINE

## 2022-06-10 PROCEDURE — 93351 STRESS ECHO (CUPID ONLY): ICD-10-PCS | Mod: 26,TXP,, | Performed by: INTERNAL MEDICINE

## 2022-06-10 PROCEDURE — 93325 STRESS ECHO (CUPID ONLY): ICD-10-PCS | Mod: 26,TXP,, | Performed by: INTERNAL MEDICINE

## 2022-06-10 PROCEDURE — 93320 DOPPLER ECHO COMPLETE: CPT | Mod: 26,TXP,, | Performed by: INTERNAL MEDICINE

## 2022-06-10 PROCEDURE — 93325 DOPPLER ECHO COLOR FLOW MAPG: CPT | Mod: 26,TXP,, | Performed by: INTERNAL MEDICINE

## 2022-06-10 NOTE — TELEPHONE ENCOUNTER
Transplant Recipient Adult Psychosocial Assessment UPDATE    JACK met with patient and his caregiver Macie Campbell, friend, in person downstairs at the hospital today. JACK reviewed caregiver plan with the patient and his primary caregiver in person. JACK provided Macie with caregiver education. Macie does not work, has transportation, and can provide full time care for the patient.     Do you and your caregivers have access to reliable transportation? yes  PRIMARY CAREGIVER: Patient reports: Macie Campbell, pt's friend, will be the patient's primary caregiver. Ph: [phone]   Confirmed     provided in-depth information to patient and caregiver regarding pre- and post-transplant caregiver role.   strongly encourages patient and caregiver to have concrete plan regarding post-transplant care giving, including back-up caregiver(s) to ensure care giving needs are met as needed.     Patient states understanding all aspects of caregiver role/commitment and is able/willing/committed to being caregiver to the fullest extent necessary.     Patient verbalizes understanding of the education provided today and caregiver responsibilities.          remains available. Patient agree to contact  in a timely manner if concerns arise.       Able to take time off work without financial concerns: yes. - retired       Additional Significant Others who may be willing to assist with Transplant:  Name: Jey Lombardo   Ph: [phone]  *unconfirmed* JACK called and left  on 6/9/22   City: [place] State: [place]  Relationship: friend  Does person drive? yes      Name: Maximiliano Tucker   Ph: [phone]  Confirmed via phone on 6/10/2022.  City: [place] State: [place]  Relationship: friend  Does person drive? yes      Name: Cipriano Ambrosio  Ph: [phone]  City: [place] State: [place]  Relationship: friend  Does person drive? yes      Name: Niyah Townsend   Ph: [phone]  City:  [place] State: [place]  Relationship: mother  Does person drive? yes      Name: Fiona Lee (nurse) Ph: [phone]  State: [place]  Relationship: mother of pt's children  Does person drive? yes     Transplant Social Work - Candidacy UPDATED  Assessment/Plan:      Psychosocial Suitability: Patient presents as a high risk candidate for liver transplant at this time due to the following psychosocial risk factors: less than a year of sobriety with a history of daily and significant alcohol abuse, and a history of mental health complications. Patient expressed a willingness to engage in IOP and or formal substance abuse treatment outpatient.      Patient's protective factors includes: confirmed caregiver plan, adequate finances and insurance, appropriately managing his PTSD with counselor visits, and an expressed willingness to engage in formal substance abuse treatment.        Recommendations/Additional Comments:      - Enroll into an outpatient formal substance abuse treatment program and sign a release of information form so transplant social work team can follow progress.     - Local Lodging with [facility]     - Adhere with any psychiatry recommendations regarding depression/anxiety medications and continue seeing counselor outpatient regularly.     - Engage in virtual/in person AA or support group meetings and document meetings attended on the log sheets provided.      - Random and routine alcohol testing (e.g. serum alcohol, urine alcohol, PETH) to gauge and monitor sobriety.      - Fundraising to alleviate transplant related costs.      - Patient to call transplant SW with any concerns or obstacles.

## 2022-06-13 ENCOUNTER — TELEPHONE (OUTPATIENT)
Dept: TRANSPLANT | Facility: CLINIC | Age: 57
End: 2022-06-13
Payer: OTHER GOVERNMENT

## 2022-06-13 NOTE — TELEPHONE ENCOUNTER
Tried twice to call patient and get PCP information.  Need to update [facility].  Patient has a voice mail that has not been set up.    Will try again tomorrow.

## 2022-06-14 ENCOUNTER — TELEPHONE (OUTPATIENT)
Dept: INFECTIOUS DISEASES | Facility: CLINIC | Age: 57
End: 2022-06-14
Payer: OTHER GOVERNMENT

## 2022-06-14 ENCOUNTER — TELEPHONE (OUTPATIENT)
Dept: TRANSPLANT | Facility: CLINIC | Age: 57
End: 2022-06-14
Payer: OTHER GOVERNMENT

## 2022-06-14 NOTE — TELEPHONE ENCOUNTER
Tried twice to call patient again today. No answer. Wanted to discuss PCP information.  Need to update chart [facility].  Also wanted to discuss positive PETH test. Patient cancelled his ID and MRI tomorrow.  Patient wants to reschedule cancelled appts for after 6/25/22.   Patient has a voice mail that has not been set up.

## 2022-06-14 NOTE — TELEPHONE ENCOUNTER
----- Message from Lien Kirkpatrick sent at 6/14/2022 10:52 AM CDT -----  Contact: @[phone]  Pt requesting a call back to reschedule his appt 06/15/22 and pt states he would like the MRI scheduled on the same day.  Please call to discuss further.  Pt states he will not be able to make it to [place] tomorrow and this was discussed 2 days ago.

## 2022-06-21 ENCOUNTER — TELEPHONE (OUTPATIENT)
Dept: HEPATOLOGY | Facility: CLINIC | Age: 57
End: 2022-06-21
Payer: OTHER GOVERNMENT

## 2022-06-21 NOTE — TELEPHONE ENCOUNTER
----- Message from Alma Benjamin sent at 6/21/2022  2:20 PM CDT -----  Contact: Yong  Teena is returning a call about his test results. Please call him back at [phone]

## 2022-06-21 NOTE — TELEPHONE ENCOUNTER
Two attempts made in the last 2 weeks to discuss PEth result with the patient but he was not reachable, unable to leave a message, only 1 phone number is available to contact

## 2022-06-29 ENCOUNTER — TELEPHONE (OUTPATIENT)
Dept: HEPATOLOGY | Facility: CLINIC | Age: 57
End: 2022-06-29
Payer: OTHER GOVERNMENT

## 2022-06-29 ENCOUNTER — TELEPHONE (OUTPATIENT)
Dept: TRANSPLANT | Facility: CLINIC | Age: 57
End: 2022-06-29
Payer: OTHER GOVERNMENT

## 2022-06-29 NOTE — TELEPHONE ENCOUNTER
"I called Mr. Townsend to inform his positive PEth result and recommended that he should go through AA and IOP program. He said he was drinking "non alcohol beer"  said I was lying, the test is a "s" test, he is on fixed income and it is costing him every trip, he used foul language and went onto rant for several minutes. I informed him that co-ordinator will reach out to him to discuss his options.   "

## 2022-06-29 NOTE — TELEPHONE ENCOUNTER
Received phone call from Dr. Burk.  Dr. Bukr called patient to discuss his positive PETH over 700.  Patient got very angry on phone and used several expletives on the phone with Dr. Burk.  Would not listen to Dr. Burk's advice and said he was on a fixed income and is angry about recommendation for AA or IOP.     Coordinator will call and follow-up with patient to explain what appointments he has left for OLT evaluation and ask if he plans to finish his evaluation.

## 2022-06-30 ENCOUNTER — TELEPHONE (OUTPATIENT)
Dept: TRANSPLANT | Facility: CLINIC | Age: 57
End: 2022-06-30
Payer: OTHER GOVERNMENT

## 2022-06-30 NOTE — TELEPHONE ENCOUNTER
Called patient.  Discussed his upcoming appts on 7/8/22 and 8/3/22 to finish his OLT evaluation.   Patient states he will be there for both appointment dates.

## 2022-07-07 ENCOUNTER — TELEPHONE (OUTPATIENT)
Dept: TRANSPLANT | Facility: CLINIC | Age: 57
End: 2022-07-07
Payer: OTHER GOVERNMENT

## 2022-07-07 DIAGNOSIS — K70.30 ALCOHOLIC CIRRHOSIS OF LIVER WITHOUT ASCITES: ICD-10-CM

## 2022-07-07 DIAGNOSIS — Z76.82 ORGAN TRANSPLANT CANDIDATE: Primary | ICD-10-CM

## 2022-07-07 NOTE — TELEPHONE ENCOUNTER
Patient called and verified that [place] [facility] [place].  Also reviewed his upcoming appt with Dr. Burk on 7/11/22 and his Psych appt on 8/3/22. Explained that all appointments are important for his OLT evaluation. Pt verbalized understanding.

## 2022-07-08 ENCOUNTER — CLINICAL SUPPORT (OUTPATIENT)
Dept: INFECTIOUS DISEASES | Facility: CLINIC | Age: 57
End: 2022-07-08
Payer: MEDICAID

## 2022-07-08 ENCOUNTER — TELEPHONE (OUTPATIENT)
Dept: INFECTIOUS DISEASES | Facility: CLINIC | Age: 57
End: 2022-07-08
Payer: OTHER GOVERNMENT

## 2022-07-08 ENCOUNTER — HOSPITAL ENCOUNTER (OUTPATIENT)
Dept: RADIOLOGY | Facility: HOSPITAL | Age: 57
Discharge: HOME | End: 2022-07-08
Attending: INTERNAL MEDICINE
Payer: OTHER GOVERNMENT

## 2022-07-08 ENCOUNTER — OFFICE VISIT (OUTPATIENT)
Dept: INFECTIOUS DISEASES | Facility: CLINIC | Age: 57
End: 2022-07-08
Payer: MEDICAID

## 2022-07-08 ENCOUNTER — HOSPITAL ENCOUNTER (OUTPATIENT)
Dept: RADIOLOGY | Facility: HOSPITAL | Age: 57
Discharge: HOME | End: 2022-07-08
Attending: INTERNAL MEDICINE
Payer: MEDICAID

## 2022-07-08 VITALS
TEMPERATURE: 98 F | WEIGHT: 175.94 LBS | DIASTOLIC BLOOD PRESSURE: 92 MMHG | BODY MASS INDEX: 24.63 KG/M2 | HEART RATE: 77 BPM | HEIGHT: 71 IN | SYSTOLIC BLOOD PRESSURE: 144 MMHG

## 2022-07-08 DIAGNOSIS — Z76.82 ORGAN TRANSPLANT CANDIDATE: Primary | ICD-10-CM

## 2022-07-08 DIAGNOSIS — K70.30 ALCOHOLIC CIRRHOSIS OF LIVER WITHOUT ASCITES: ICD-10-CM

## 2022-07-08 DIAGNOSIS — Z23 NEED FOR PNEUMOCOCCAL VACCINATION: ICD-10-CM

## 2022-07-08 DIAGNOSIS — Z76.82 ORGAN TRANSPLANT CANDIDATE: ICD-10-CM

## 2022-07-08 DIAGNOSIS — Z23 NEED FOR ZOSTER VACCINATION: ICD-10-CM

## 2022-07-08 DIAGNOSIS — Z23 NEED FOR HEPATITIS B VACCINATION: ICD-10-CM

## 2022-07-08 DIAGNOSIS — B78.9 STRONGYLOIDIASIS: ICD-10-CM

## 2022-07-08 PROCEDURE — 3008F PR BODY MASS INDEX (BMI) DOCUMENTED: ICD-10-PCS | Mod: CPTII,TXP,, | Performed by: INTERNAL MEDICINE

## 2022-07-08 PROCEDURE — 1160F RVW MEDS BY RX/DR IN RCRD: CPT | Mod: CPTII,TXP,, | Performed by: INTERNAL MEDICINE

## 2022-07-08 PROCEDURE — 1160F PR REVIEW ALL MEDS BY PRESCRIBER/CLIN PHARMACIST DOCUMENTED: ICD-10-PCS | Mod: CPTII,TXP,, | Performed by: INTERNAL MEDICINE

## 2022-07-08 PROCEDURE — 3008F BODY MASS INDEX DOCD: CPT | Mod: CPTII,TXP,, | Performed by: INTERNAL MEDICINE

## 2022-07-08 PROCEDURE — 3080F DIAST BP >= 90 MM HG: CPT | Mod: CPTII,TXP,, | Performed by: INTERNAL MEDICINE

## 2022-07-08 PROCEDURE — 3077F SYST BP >= 140 MM HG: CPT | Mod: CPTII,TXP,, | Performed by: INTERNAL MEDICINE

## 2022-07-08 PROCEDURE — 71046 X-RAY EXAM CHEST 2 VIEWS: CPT | Mod: 26,TXP,, | Performed by: RADIOLOGY

## 2022-07-08 PROCEDURE — 99999 PR PBB SHADOW E&M-EST. PATIENT-LVL III: ICD-10-PCS | Mod: PBBFAC,TXP,, | Performed by: INTERNAL MEDICINE

## 2022-07-08 PROCEDURE — 90750 HZV VACC RECOMBINANT IM: CPT | Mod: PBBFAC,TXP

## 2022-07-08 PROCEDURE — 1159F PR MEDICATION LIST DOCUMENTED IN MEDICAL RECORD: ICD-10-PCS | Mod: CPTII,TXP,, | Performed by: INTERNAL MEDICINE

## 2022-07-08 PROCEDURE — 99203 OFFICE O/P NEW LOW 30 MIN: CPT | Mod: S$PBB,TXP,, | Performed by: INTERNAL MEDICINE

## 2022-07-08 PROCEDURE — 3044F HG A1C LEVEL LT 7.0%: CPT | Mod: CPTII,TXP,, | Performed by: INTERNAL MEDICINE

## 2022-07-08 PROCEDURE — 71046 X-RAY EXAM CHEST 2 VIEWS: CPT | Mod: TC,TXP

## 2022-07-08 PROCEDURE — 99203 PR OFFICE/OUTPT VISIT, NEW, LEVL III, 30-44 MIN: ICD-10-PCS | Mod: S$PBB,TXP,, | Performed by: INTERNAL MEDICINE

## 2022-07-08 PROCEDURE — 3077F PR MOST RECENT SYSTOLIC BLOOD PRESSURE >= 140 MM HG: ICD-10-PCS | Mod: CPTII,TXP,, | Performed by: INTERNAL MEDICINE

## 2022-07-08 PROCEDURE — 90677 PCV20 VACCINE IM: CPT | Mod: PBBFAC,TXP

## 2022-07-08 PROCEDURE — 99213 OFFICE O/P EST LOW 20 MIN: CPT | Mod: PBBFAC,25,TXP | Performed by: INTERNAL MEDICINE

## 2022-07-08 PROCEDURE — 3080F PR MOST RECENT DIASTOLIC BLOOD PRESSURE >= 90 MM HG: ICD-10-PCS | Mod: CPTII,TXP,, | Performed by: INTERNAL MEDICINE

## 2022-07-08 PROCEDURE — 99999 PR PBB SHADOW E&M-EST. PATIENT-LVL III: CPT | Mod: PBBFAC,TXP,, | Performed by: INTERNAL MEDICINE

## 2022-07-08 PROCEDURE — 1159F MED LIST DOCD IN RCRD: CPT | Mod: CPTII,TXP,, | Performed by: INTERNAL MEDICINE

## 2022-07-08 PROCEDURE — 90739 HEPB VACC 2/4 DOSE ADULT IM: CPT | Mod: PBBFAC,TXP

## 2022-07-08 PROCEDURE — 3044F PR MOST RECENT HEMOGLOBIN A1C LEVEL <7.0%: ICD-10-PCS | Mod: CPTII,TXP,, | Performed by: INTERNAL MEDICINE

## 2022-07-08 PROCEDURE — 71046 XR CHEST PA AND LATERAL: ICD-10-PCS | Mod: 26,TXP,, | Performed by: RADIOLOGY

## 2022-07-08 RX ORDER — IVERMECTIN 3 MG/1
18 TABLET ORAL ONCE
Qty: 6 TABLET | Refills: 0 | Status: SHIPPED | OUTPATIENT
Start: 2022-07-08 | End: 2022-07-08

## 2022-07-08 RX ORDER — TRAMADOL HYDROCHLORIDE 100 MG/1
100 TABLET, EXTENDED RELEASE ORAL DAILY
COMMUNITY

## 2022-07-08 NOTE — PROGRESS NOTES
Patient received zoster #1, Hep B #1 in the left deltoid and Prevnar 20 vaccine in the right deltoid. Pt tolerated well. Pt asked to wait in the clinic 15 minutes after injection in the event of an allergic reaction. Pt verbalized understanding. Pt left in NAD.

## 2022-07-08 NOTE — PROGRESS NOTES
Subjective:      Patient ID: Yong Townsend is a 57 y.o. male.    Chief Complaint:Liver Transplant Evaluation      History of Present Illness    Patient presents for liver transplant evaluation. Serologies reviewed. Patient has positive hepatitis A antibody, negative hepatitis B antibody, and positive strongyloides antibody.  States that he remembers being hospitalized as a boy for worms.     Review of Systems   Constitutional: Negative for chills, decreased appetite, fever, malaise/fatigue, night sweats, weight gain and weight loss.   HENT: Negative for congestion, ear pain, hearing loss, hoarse voice, sore throat and tinnitus.    Eyes: Negative for blurred vision, redness and visual disturbance.   Cardiovascular: Negative for chest pain, leg swelling and palpitations.   Respiratory: Negative for cough, hemoptysis, shortness of breath, sputum production and wheezing.    Hematologic/Lymphatic: Negative for adenopathy. Does not bruise/bleed easily.   Skin: Negative for dry skin, itching, rash and suspicious lesions.   Musculoskeletal: Negative for back pain, joint pain, myalgias and neck pain.   Gastrointestinal: Negative for abdominal pain, constipation, diarrhea, heartburn, nausea and vomiting.   Genitourinary: Negative for dysuria, flank pain, frequency, hematuria, hesitancy and urgency.   Neurological: Negative for dizziness, headaches, numbness, paresthesias and weakness.   Psychiatric/Behavioral: Negative for depression and memory loss. The patient does not have insomnia and is not nervous/anxious.      Objective:   Physical Exam  Vitals and nursing note reviewed.   Constitutional:       Appearance: Normal appearance.   HENT:      Head: Normocephalic and atraumatic.      Nose: Nose normal.      Mouth/Throat:      Mouth: Mucous membranes are dry.   Eyes:      Extraocular Movements: Extraocular movements intact.      Conjunctiva/sclera: Conjunctivae normal.      Pupils: Pupils are equal, round, and reactive to  light.   Cardiovascular:      Rate and Rhythm: Normal rate and regular rhythm.   Pulmonary:      Effort: Pulmonary effort is normal.      Breath sounds: Normal breath sounds.   Abdominal:      General: Abdomen is flat.      Tenderness: There is no abdominal tenderness.   Musculoskeletal:         General: Normal range of motion.      Right lower leg: No edema.      Left lower leg: No edema.   Skin:     General: Skin is warm and dry.   Neurological:      Mental Status: He is alert.       Assessment:       1. Organ transplant candidate    2. Need for zoster vaccination    3. Need for hepatitis B vaccination    4. Need for pneumococcal vaccination    5. Strongyloidiasis          Plan:       56 yo male with strongyloidiasis, cirrhosis. Discussed food and water precautions - he was unaware that eating raw oysters was risky. Discussed travel precautions.    Will treat with ivermectin for stongyloidiasis. Recommend vaccination for zoster, HBV, and pneumococcus. I spent over 30 minutes on this encounter.

## 2022-07-12 ENCOUNTER — TELEPHONE (OUTPATIENT)
Dept: TRANSPLANT | Facility: CLINIC | Age: 57
End: 2022-07-12
Payer: OTHER GOVERNMENT

## 2022-07-12 DIAGNOSIS — Z76.82 ORGAN TRANSPLANT CANDIDATE: Primary | ICD-10-CM

## 2022-07-12 NOTE — TELEPHONE ENCOUNTER
Reviewed upcoming appointments on 8/3/22 with patient.   Labs   CT scan  Psych appt.    Explained that these appointments will finish his evaluation.j  Patient states he will be here for appointments.

## 2022-07-14 ENCOUNTER — TELEPHONE (OUTPATIENT)
Dept: TRANSPLANT | Facility: CLINIC | Age: 57
End: 2022-07-14
Payer: OTHER GOVERNMENT

## 2022-07-14 NOTE — TELEPHONE ENCOUNTER
Lula Burk MD  [facility] Pre-Liver Transplant Clinical    Peth is positive again       PETH 891  Patient continues to drink knowing he has liver disease.   Patient has appointment scheduled with Addictive Psych on 8/3/22.

## 2022-07-15 ENCOUNTER — TELEPHONE (OUTPATIENT)
Dept: TRANSPLANT | Facility: CLINIC | Age: 57
End: 2022-07-15
Payer: OTHER GOVERNMENT

## 2022-07-15 NOTE — TELEPHONE ENCOUNTER
From: Ruth Darling   Sent: 7/15/2022  11:40 AM CDT   To: [facility] Pre-Liver Transplant Clinical   Subject: Speak with office                                 Pt is calling to speak with his coordinator Saravanan.     [phone]    ____________    Patient wanted to review his upcoming appointments in August.  Reviewed appointments with patient. Patient confirmed he will come to appointments.

## 2022-07-20 ENCOUNTER — TELEPHONE (OUTPATIENT)
Dept: TRANSPLANT | Facility: CLINIC | Age: 57
End: 2022-07-20
Payer: OTHER GOVERNMENT

## 2022-07-20 NOTE — TELEPHONE ENCOUNTER
Lula Burk MD  [facility] Pre-Liver Transplant Clinical     Recommend vaccination for zoster, HBV, and pneumococcus

## 2022-08-01 ENCOUNTER — TELEPHONE (OUTPATIENT)
Dept: TRANSPLANT | Facility: CLINIC | Age: 57
End: 2022-08-01
Payer: OTHER GOVERNMENT

## 2022-08-01 NOTE — TELEPHONE ENCOUNTER
Zoila MONTERO [facility] Pre-Liver Transplant Clinical  Caller: Unspecified (Today, 12:30 PM)  Pt would like to move all of his appts on 08/03 to either 08/04 or 08/05.     Yong  @ [phone]   ________________  Returned call to patient. No answer. Unable to leave voice message

## 2022-08-02 ENCOUNTER — TELEPHONE (OUTPATIENT)
Dept: TRANSPLANT | Facility: CLINIC | Age: 57
End: 2022-08-02
Payer: OTHER GOVERNMENT

## 2022-08-02 NOTE — TELEPHONE ENCOUNTER
"Patient left message about cancelling his appointments tomorrow with addictive psychiatry due to not having enough finances.  Explained to Mr. Townsend  that it is very important for him to talk to his caregivers and come to [facility] tomorrow to see psych and talk about what is on his mind.  Patient started talking about how depressed and stressed he has been because he kids live with their mom in [place], he has no friends and he is stuck in [place]. Talks about being a  in [place] and wants to get back to that life.  Patient went on to tell me that several days ago he felt suicidal. He owns a few guns. States that he does not keep bullets in the house. He didn't try to kill himself because he was scared. He called 911 instead.   An ambulance and several police offers came to his house and talked to him.   I asked him if he feels suicidal today. He said "no I don't feel suicidal today".  I told him that he did the right thing by calling 911.  He talked about his parents and how he gets tired of hearing his mom tell him to "get over it" or give it to God". States that it "makes him feel angry" when his mother says those things to him.  Was on the phone with him for about an hour. He became tearful on the phone talking about his daughter who was murdered 4 years ago. He talked about being a [facility]  and [place].   He said that some days he feels like he is losing his mind. He then started talking about his 12 yo son Jenaro and his 18 year old daughter Benita and how proud he is of them and his mood became more positive on the phone talking about his kids.  Told me that he wants to live for his kids. He told me that sometimes all he needs is someone to talk to when he feels like he is having a crises. I again told him that if he ever feels like he is having a suicidal crises he should call 911. I also shared with him the number to the [employer] Crisis Line (Dial 988 and press 1) " "which is also the National suicide Prevention Lifeline. Patient states he appreciates that he has an option to call the Veterans crisis line because he just needs someone to talk to sometimes and "needs someone to help him be the best dad for (son 10 yo) Jenaro, (daughter 17 yo) Benita  and (daughter 24 yo.) Arianna." Patient thanked me for letting him vent and talk about what is bothering him.  I again strongly encouraged him to keep his Psych appt tomorrow with Dr. Wang. Patient agreed that the appt is very important and he will work on coming to his appointments tomorrow. Patient thanked me again for listening and providing crisis hotline numbers. Completed a risk assessment and  did not feel he met criteria for involuntary psych hospitalization. Patient was seen by , Jules Rosales, in June. Jules's notes state that patient is managing his PTSD with counselor visits. Message will be sent to transplant social work asking them to provide patient with additional resources for a local psychiatrist that is covered by patient's insurance or veterans benefits. Patient would benefit to seeing a local psychiatrist regularly for close psychiatric follow up.           "

## 2022-08-03 ENCOUNTER — DOCUMENTATION ONLY (OUTPATIENT)
Dept: PSYCHIATRY | Facility: CLINIC | Age: 57
End: 2022-08-03
Payer: OTHER GOVERNMENT

## 2022-08-03 ENCOUNTER — HOSPITAL ENCOUNTER (OUTPATIENT)
Dept: RADIOLOGY | Facility: HOSPITAL | Age: 57
Discharge: HOME | End: 2022-08-03
Attending: INTERNAL MEDICINE
Payer: OTHER GOVERNMENT

## 2022-08-03 ENCOUNTER — TELEPHONE (OUTPATIENT)
Dept: TRANSPLANT | Facility: CLINIC | Age: 57
End: 2022-08-03
Payer: OTHER GOVERNMENT

## 2022-08-03 DIAGNOSIS — Z76.82 ORGAN TRANSPLANT CANDIDATE: ICD-10-CM

## 2022-08-03 DIAGNOSIS — Z76.82 ORGAN TRANSPLANT CANDIDATE: Primary | ICD-10-CM

## 2022-08-03 PROCEDURE — 74170 CT ABD WO CNTRST FLWD CNTRST: CPT | Mod: TC,TXP

## 2022-08-03 PROCEDURE — 74170 CT ABD WO CNTRST FLWD CNTRST: CPT | Mod: 26,TXP,, | Performed by: RADIOLOGY

## 2022-08-03 PROCEDURE — 25500020 PHARM REV CODE 255: Mod: TXP | Performed by: INTERNAL MEDICINE

## 2022-08-03 PROCEDURE — 74170 CT ABDOMEN W WO CONTRAST: ICD-10-PCS | Mod: 26,TXP,, | Performed by: RADIOLOGY

## 2022-08-03 RX ADMIN — IOHEXOL 100 ML: 350 INJECTION, SOLUTION INTRAVENOUS at 06:08

## 2022-08-03 NOTE — TELEPHONE ENCOUNTER
Yoana MONTERO [facility] Pre-Liver Transplant Clinical  Caller: patient (Today, 12:31 PM)    Patient called stating that he would like to reschedule today's appointments due to bad weather.       [phone] (home)   ______________________  Returned call. No answer. Voice mail not set up.  Appts cancelled. Will reach out again to patient later today.

## 2022-08-03 NOTE — PROGRESS NOTES
Patient called to say he would be late to appointment but then never showed.  Per liver transplant team, he later told them he was in the building but was lost.

## 2022-08-05 ENCOUNTER — TELEPHONE (OUTPATIENT)
Dept: TRANSPLANT | Facility: CLINIC | Age: 57
End: 2022-08-05
Payer: OTHER GOVERNMENT

## 2022-08-05 NOTE — TELEPHONE ENCOUNTER
Spoke to patient about getting a VA referral if he needs a transplant since he told  that he has full VA coverage.  Patient states he does not want to have anything to do with using VA. He says the VA system is very challenging to deal with and he feels he does not get the kind of care he needs using VA benefits. He only wants to use his [employer] coverage. Message sent to  about the patient not wanting to use his VA coverage or get a referral from VA.     Patient states that he is calling [facility] on Monday to make another appointment since he didn't arrive on time for his last appointment.

## 2022-08-09 ENCOUNTER — DOCUMENTATION ONLY (OUTPATIENT)
Dept: TRANSPLANT | Facility: CLINIC | Age: 57
End: 2022-08-09
Payer: OTHER GOVERNMENT

## 2022-08-10 ENCOUNTER — COMMITTEE REVIEW (OUTPATIENT)
Dept: TRANSPLANT | Facility: CLINIC | Age: 57
End: 2022-08-10
Payer: OTHER GOVERNMENT

## 2022-08-10 NOTE — COMMITTEE REVIEW
Yong Townsend's case presented to selection committee.  Patient has been declined for liver transplant due to continued alcohol use with positive peths, Psych issues including PTSD. Pt also needs VA referral is being reconsidered for transplant.   I was present at the committee meeting and attest to the decision of the committee.    Christoph Fierro  08/10/2022

## 2022-08-11 ENCOUNTER — TELEPHONE (OUTPATIENT)
Dept: TRANSPLANT | Facility: CLINIC | Age: 57
End: 2022-08-11
Payer: OTHER GOVERNMENT

## 2022-08-11 NOTE — TELEPHONE ENCOUNTER
Patient returned phone call.  Explained to patient that his case was discussed in liver transplant selection committee.  Explained that he has been declined for liver transplant due to having 2 positive PETHS and continued use of alcohol.  Encouraged patient to follow-up with future hepatology appointments with his hepatologist. Patient states that if he is declined for transplant then he will not reschedule his addictive psych appointment.  Encouraged patient to reach out and schedule therapy to help with his alcohol addiction and PTSD.     Patient asked if I can type up a letter and mail it to him to submit to social security stating how long he was in transplant evaluation.  Coordinator will type up a letter and mail to patient as requested.

## 2022-08-11 NOTE — TELEPHONE ENCOUNTER
"Patient called asking for coordinator to call back regarding a letter that he needs.  Coordinator called back both mobile phone and home phone. Unable to leave voice message on mobile phone. House phone says "not in service.'    Coordinator called to discuss that he has been declined for transplant due to continued alcohol use with positive peths. Patient also did not reschedule his missed addictive psych appointment and does not have a voice mail set up even though transplant coordinator has explained to patient in the past that having a working voicemail is important for his medical team to leave messages for him.     Transplant episode closed.   "

## 2022-08-12 ENCOUNTER — TELEPHONE (OUTPATIENT)
Dept: HEPATOLOGY | Facility: CLINIC | Age: 57
End: 2022-08-12
Payer: OTHER GOVERNMENT

## 2022-08-12 NOTE — TELEPHONE ENCOUNTER
Tried all the numbers that are available to discuss and inform that patient has been declined  for liver transplant listing, could not leave message to call back also.

## 2022-08-13 ENCOUNTER — ANESTHESIA EVENT (OUTPATIENT)
Dept: SURGERY | Facility: HOSPITAL | Age: 57
DRG: 377 | End: 2022-08-13
Payer: MEDICAID

## 2022-08-13 ENCOUNTER — ANESTHESIA (OUTPATIENT)
Dept: SURGERY | Facility: HOSPITAL | Age: 57
DRG: 377 | End: 2022-08-13
Payer: OTHER GOVERNMENT

## 2022-08-13 ENCOUNTER — HOSPITAL ENCOUNTER (INPATIENT)
Facility: HOSPITAL | Age: 57
LOS: 3 days | Discharge: HOME | DRG: 377 | End: 2022-08-16
Attending: INTERNAL MEDICINE | Admitting: INTERNAL MEDICINE
Payer: MEDICAID

## 2022-08-13 DIAGNOSIS — R07.9 CHEST PAIN: ICD-10-CM

## 2022-08-13 DIAGNOSIS — K92.2 GI BLEED: ICD-10-CM

## 2022-08-13 DIAGNOSIS — D62 ANEMIA DUE TO ACUTE BLOOD LOSS: Primary | ICD-10-CM

## 2022-08-13 PROBLEM — D50.0 ANEMIA DUE TO GASTROINTESTINAL BLOOD LOSS: Status: ACTIVE | Noted: 2022-08-13

## 2022-08-13 LAB
ABO + RH BLD: NORMAL
ABS NEUT (OLG): 5.72 X10(3)/MCL (ref 2.1–9.2)
ALBUMIN SERPL-MCNC: 2.3 GM/DL (ref 3.5–5)
ALBUMIN/GLOB SERPL: 0.6 RATIO (ref 1.1–2)
ALP SERPL-CCNC: 54 UNIT/L (ref 40–150)
ALT SERPL-CCNC: 29 UNIT/L (ref 0–55)
AMMONIA PLAS-MSCNC: 78.9 UMOL/L (ref 18–72)
ANISOCYTOSIS BLD QL SMEAR: ABNORMAL
APTT PPP: 42.3 SECONDS (ref 23.2–33.7)
AST SERPL-CCNC: 73 UNIT/L (ref 5–34)
BILIRUBIN DIRECT+TOT PNL SERPL-MCNC: 6.5 MG/DL
BLD PROD TYP BPU: NORMAL
BLOOD UNIT EXPIRATION DATE: NORMAL
BLOOD UNIT TYPE CODE: 6200
BUN SERPL-MCNC: 12.6 MG/DL (ref 8.4–25.7)
CALCIUM SERPL-MCNC: 8.1 MG/DL (ref 8.4–10.2)
CHLORIDE SERPL-SCNC: 110 MMOL/L (ref 98–107)
CO2 SERPL-SCNC: 19 MMOL/L (ref 22–29)
CREAT SERPL-MCNC: 0.87 MG/DL (ref 0.73–1.18)
CROSSMATCH INTERPRETATION: NORMAL
DISPENSE STATUS: NORMAL
ERYTHROCYTE [DISTWIDTH] IN BLOOD BY AUTOMATED COUNT: 21.2 % (ref 11.5–17)
FLUAV AG UPPER RESP QL IA.RAPID: NOT DETECTED
FLUBV AG UPPER RESP QL IA.RAPID: NOT DETECTED
GFR SERPLBLD CREATININE-BSD FMLA CKD-EPI: >60 MLS/MIN/1.73/M2
GLOBULIN SER-MCNC: 4 GM/DL (ref 2.4–3.5)
GLUCOSE SERPL-MCNC: 118 MG/DL (ref 74–100)
GROUP & RH: NORMAL
GROUP & RH: NORMAL
HCT VFR BLD AUTO: 24.2 % (ref 42–52)
HCT VFR BLD AUTO: 25.8 % (ref 42–52)
HGB BLD-MCNC: 7.9 GM/DL (ref 14–18)
HGB BLD-MCNC: 8.3 GM/DL (ref 14–18)
IMM GRANULOCYTES # BLD AUTO: 0.08 X10(3)/MCL (ref 0–0.04)
IMM GRANULOCYTES NFR BLD AUTO: 1.3 %
INDIRECT COOMBS GEL: NORMAL
INDIRECT COOMBS GEL: NORMAL
INR BLD: 2.13 (ref 0–1.3)
INR BLD: 2.22 (ref 0–1.3)
INSTRUMENT WBC (OLG): 5.9 X10(3)/MCL
LACTATE SERPL-SCNC: 3.6 MMOL/L (ref 0.5–2.2)
LACTATE SERPL-SCNC: 5 MMOL/L (ref 0.5–2.2)
LYMPHOCYTES NFR BLD MANUAL: 0.06 X10(3)/MCL
LYMPHOCYTES NFR BLD MANUAL: 1 %
MACROCYTES BLD QL SMEAR: ABNORMAL
MAGNESIUM SERPL-MCNC: 1 MG/DL (ref 1.6–2.6)
MCH RBC QN AUTO: 30.2 PG (ref 27–31)
MCHC RBC AUTO-ENTMCNC: 32.6 MG/DL (ref 33–36)
MCV RBC AUTO: 92.4 FL (ref 80–94)
MONOCYTES NFR BLD MANUAL: 0.18 X10(3)/MCL (ref 0.1–1.3)
MONOCYTES NFR BLD MANUAL: 3 %
MYELOCYTES NFR BLD MANUAL: 1 %
NEUTROPHILS NFR BLD MANUAL: 96 %
NRBC BLD AUTO-RTO: 0 %
PHOSPHATE SERPL-MCNC: 2.8 MG/DL (ref 2.3–4.7)
PLATELET # BLD AUTO: 42 X10(3)/MCL (ref 130–400)
PLATELET # BLD EST: ABNORMAL 10*3/UL
PMV BLD AUTO: ABNORMAL FL
POIKILOCYTOSIS BLD QL SMEAR: ABNORMAL
POTASSIUM SERPL-SCNC: 3.7 MMOL/L (ref 3.5–5.1)
PROT SERPL-MCNC: 6.3 GM/DL (ref 6.4–8.3)
PROTHROMBIN TIME: 23.4 SECONDS (ref 12.5–14.5)
PROTHROMBIN TIME: 24.2 SECONDS (ref 12.5–14.5)
RBC # BLD AUTO: 2.62 X10(6)/MCL (ref 4.7–6.1)
RBC MORPH BLD: ABNORMAL
SARS-COV-2 RNA RESP QL NAA+PROBE: NOT DETECTED
SODIUM SERPL-SCNC: 140 MMOL/L (ref 136–145)
TARGETS BLD QL SMEAR: ABNORMAL
UNIT NUMBER: NORMAL
WBC # SPEC AUTO: 5.9 X10(3)/MCL (ref 4.5–11.5)

## 2022-08-13 PROCEDURE — 25500020 PHARM REV CODE 255: Performed by: INTERNAL MEDICINE

## 2022-08-13 PROCEDURE — 25000003 PHARM REV CODE 250: Performed by: NURSE ANESTHETIST, CERTIFIED REGISTERED

## 2022-08-13 PROCEDURE — 27000207 HC ISOLATION

## 2022-08-13 PROCEDURE — 84100 ASSAY OF PHOSPHORUS: CPT | Performed by: NURSE PRACTITIONER

## 2022-08-13 PROCEDURE — 25000003 PHARM REV CODE 250: Performed by: NURSE PRACTITIONER

## 2022-08-13 PROCEDURE — 25000003 PHARM REV CODE 250: Performed by: INTERNAL MEDICINE

## 2022-08-13 PROCEDURE — 85014 HEMATOCRIT: CPT | Performed by: NURSE PRACTITIONER

## 2022-08-13 PROCEDURE — 80053 COMPREHEN METABOLIC PANEL: CPT | Performed by: NURSE PRACTITIONER

## 2022-08-13 PROCEDURE — 86850 RBC ANTIBODY SCREEN: CPT | Performed by: NURSE PRACTITIONER

## 2022-08-13 PROCEDURE — 37000008 HC ANESTHESIA 1ST 15 MINUTES: Performed by: INTERNAL MEDICINE

## 2022-08-13 PROCEDURE — 37000009 HC ANESTHESIA EA ADD 15 MINS: Performed by: INTERNAL MEDICINE

## 2022-08-13 PROCEDURE — 11000001 HC ACUTE MED/SURG PRIVATE ROOM

## 2022-08-13 PROCEDURE — 86920 COMPATIBILITY TEST SPIN: CPT | Performed by: INTERNAL MEDICINE

## 2022-08-13 PROCEDURE — 85730 THROMBOPLASTIN TIME PARTIAL: CPT | Performed by: NURSE PRACTITIONER

## 2022-08-13 PROCEDURE — 63600175 PHARM REV CODE 636 W HCPCS: Performed by: INTERNAL MEDICINE

## 2022-08-13 PROCEDURE — 63600175 PHARM REV CODE 636 W HCPCS: Performed by: NURSE ANESTHETIST, CERTIFIED REGISTERED

## 2022-08-13 PROCEDURE — 36415 COLL VENOUS BLD VENIPUNCTURE: CPT | Performed by: NURSE PRACTITIONER

## 2022-08-13 PROCEDURE — 83735 ASSAY OF MAGNESIUM: CPT | Performed by: NURSE PRACTITIONER

## 2022-08-13 PROCEDURE — 83605 ASSAY OF LACTIC ACID: CPT | Performed by: NURSE PRACTITIONER

## 2022-08-13 PROCEDURE — 87040 BLOOD CULTURE FOR BACTERIA: CPT | Performed by: NURSE PRACTITIONER

## 2022-08-13 PROCEDURE — 21400001 HC TELEMETRY ROOM

## 2022-08-13 PROCEDURE — 82140 ASSAY OF AMMONIA: CPT | Performed by: NURSE PRACTITIONER

## 2022-08-13 PROCEDURE — 85025 COMPLETE CBC W/AUTO DIFF WBC: CPT | Performed by: NURSE PRACTITIONER

## 2022-08-13 PROCEDURE — 63600175 PHARM REV CODE 636 W HCPCS: Performed by: NURSE PRACTITIONER

## 2022-08-13 PROCEDURE — 43255 EGD CONTROL BLEEDING ANY: CPT | Performed by: INTERNAL MEDICINE

## 2022-08-13 PROCEDURE — 87636 SARSCOV2 & INF A&B AMP PRB: CPT | Performed by: NURSE PRACTITIONER

## 2022-08-13 PROCEDURE — 85610 PROTHROMBIN TIME: CPT | Performed by: NURSE PRACTITIONER

## 2022-08-13 PROCEDURE — C9113 INJ PANTOPRAZOLE SODIUM, VIA: HCPCS | Performed by: NURSE PRACTITIONER

## 2022-08-13 PROCEDURE — 86850 RBC ANTIBODY SCREEN: CPT | Mod: 91 | Performed by: INTERNAL MEDICINE

## 2022-08-13 PROCEDURE — 36430 TRANSFUSION BLD/BLD COMPNT: CPT

## 2022-08-13 PROCEDURE — P9035 PLATELET PHERES LEUKOREDUCED: HCPCS | Performed by: NURSE PRACTITIONER

## 2022-08-13 RX ORDER — SODIUM CHLORIDE 0.9 % (FLUSH) 0.9 %
10 SYRINGE (ML) INJECTION EVERY 12 HOURS PRN
Status: DISCONTINUED | OUTPATIENT
Start: 2022-08-13 | End: 2022-08-16 | Stop reason: HOSPADM

## 2022-08-13 RX ORDER — SODIUM CHLORIDE, SODIUM LACTATE, POTASSIUM CHLORIDE, CALCIUM CHLORIDE 600; 310; 30; 20 MG/100ML; MG/100ML; MG/100ML; MG/100ML
INJECTION, SOLUTION INTRAVENOUS CONTINUOUS
Status: DISCONTINUED | OUTPATIENT
Start: 2022-08-13 | End: 2022-08-16 | Stop reason: HOSPADM

## 2022-08-13 RX ORDER — IBUPROFEN 800 MG/1
800 TABLET ORAL EVERY 8 HOURS PRN
Status: DISCONTINUED | OUTPATIENT
Start: 2022-08-13 | End: 2022-08-13

## 2022-08-13 RX ORDER — CHLORDIAZEPOXIDE HYDROCHLORIDE 25 MG/1
25 CAPSULE, GELATIN COATED ORAL
Status: DISCONTINUED | OUTPATIENT
Start: 2022-08-17 | End: 2022-08-13

## 2022-08-13 RX ORDER — FOLIC ACID 1 MG/1
1 TABLET ORAL DAILY
Status: DISCONTINUED | OUTPATIENT
Start: 2022-08-13 | End: 2022-08-16 | Stop reason: HOSPADM

## 2022-08-13 RX ORDER — CHLORDIAZEPOXIDE HYDROCHLORIDE 25 MG/1
25 CAPSULE, GELATIN COATED ORAL
Status: DISCONTINUED | OUTPATIENT
Start: 2022-08-16 | End: 2022-08-13

## 2022-08-13 RX ORDER — ONDANSETRON 2 MG/ML
INJECTION INTRAMUSCULAR; INTRAVENOUS
Status: DISCONTINUED | OUTPATIENT
Start: 2022-08-13 | End: 2022-08-13

## 2022-08-13 RX ORDER — CHLORDIAZEPOXIDE HYDROCHLORIDE 25 MG/1
100 CAPSULE, GELATIN COATED ORAL ONCE
Status: DISCONTINUED | OUTPATIENT
Start: 2022-08-13 | End: 2022-08-13

## 2022-08-13 RX ORDER — CHLORDIAZEPOXIDE HYDROCHLORIDE 25 MG/1
25 CAPSULE, GELATIN COATED ORAL 4 TIMES DAILY PRN
Status: DISCONTINUED | OUTPATIENT
Start: 2022-08-13 | End: 2022-08-13

## 2022-08-13 RX ORDER — CHLORDIAZEPOXIDE HYDROCHLORIDE 25 MG/1
25 CAPSULE, GELATIN COATED ORAL
Status: DISCONTINUED | OUTPATIENT
Start: 2022-08-15 | End: 2022-08-13

## 2022-08-13 RX ORDER — FENTANYL CITRATE 50 UG/ML
INJECTION, SOLUTION INTRAMUSCULAR; INTRAVENOUS
Status: DISCONTINUED | OUTPATIENT
Start: 2022-08-13 | End: 2022-08-13

## 2022-08-13 RX ORDER — KETOROLAC TROMETHAMINE 30 MG/ML
15 INJECTION, SOLUTION INTRAMUSCULAR; INTRAVENOUS ONCE
Status: COMPLETED | OUTPATIENT
Start: 2022-08-13 | End: 2022-08-13

## 2022-08-13 RX ORDER — CHLORDIAZEPOXIDE HYDROCHLORIDE 25 MG/1
25 CAPSULE, GELATIN COATED ORAL
Status: DISCONTINUED | OUTPATIENT
Start: 2022-08-18 | End: 2022-08-13

## 2022-08-13 RX ORDER — SUCCINYLCHOLINE CHLORIDE 20 MG/ML
INJECTION INTRAMUSCULAR; INTRAVENOUS
Status: DISCONTINUED | OUTPATIENT
Start: 2022-08-13 | End: 2022-08-13

## 2022-08-13 RX ORDER — HYDROCODONE BITARTRATE AND ACETAMINOPHEN 500; 5 MG/1; MG/1
TABLET ORAL
Status: DISCONTINUED | OUTPATIENT
Start: 2022-08-13 | End: 2022-08-16 | Stop reason: HOSPADM

## 2022-08-13 RX ORDER — NALOXONE HCL 0.4 MG/ML
0.02 VIAL (ML) INJECTION
Status: DISCONTINUED | OUTPATIENT
Start: 2022-08-13 | End: 2022-08-16 | Stop reason: HOSPADM

## 2022-08-13 RX ORDER — CHLORDIAZEPOXIDE HYDROCHLORIDE 25 MG/1
50 CAPSULE, GELATIN COATED ORAL
Status: DISCONTINUED | OUTPATIENT
Start: 2022-08-14 | End: 2022-08-13

## 2022-08-13 RX ORDER — ACETAMINOPHEN 325 MG/1
650 TABLET ORAL EVERY 6 HOURS PRN
Status: DISCONTINUED | OUTPATIENT
Start: 2022-08-13 | End: 2022-08-16 | Stop reason: HOSPADM

## 2022-08-13 RX ORDER — ETOMIDATE 2 MG/ML
INJECTION INTRAVENOUS
Status: DISCONTINUED | OUTPATIENT
Start: 2022-08-13 | End: 2022-08-13

## 2022-08-13 RX ORDER — LORAZEPAM 1 MG/1
1 TABLET ORAL EVERY 4 HOURS PRN
Status: DISCONTINUED | OUTPATIENT
Start: 2022-08-13 | End: 2022-08-16 | Stop reason: HOSPADM

## 2022-08-13 RX ORDER — CHLORDIAZEPOXIDE HYDROCHLORIDE 25 MG/1
50 CAPSULE, GELATIN COATED ORAL
Status: DISCONTINUED | OUTPATIENT
Start: 2022-08-13 | End: 2022-08-13

## 2022-08-13 RX ADMIN — PANTOPRAZOLE SODIUM 8 MG/HR: 40 INJECTION, POWDER, FOR SOLUTION INTRAVENOUS at 07:08

## 2022-08-13 RX ADMIN — CHLORDIAZEPOXIDE HYDROCHLORIDE 100 MG: 25 CAPSULE ORAL at 09:08

## 2022-08-13 RX ADMIN — SODIUM CHLORIDE, SODIUM GLUCONATE, SODIUM ACETATE, POTASSIUM CHLORIDE AND MAGNESIUM CHLORIDE: 526; 502; 368; 37; 30 INJECTION, SOLUTION INTRAVENOUS at 03:08

## 2022-08-13 RX ADMIN — IOPAMIDOL 100 ML: 755 INJECTION, SOLUTION INTRAVENOUS at 07:08

## 2022-08-13 RX ADMIN — FENTANYL CITRATE 100 MCG: 50 INJECTION, SOLUTION INTRAMUSCULAR; INTRAVENOUS at 03:08

## 2022-08-13 RX ADMIN — Medication 650 MG: at 09:08

## 2022-08-13 RX ADMIN — FOLIC ACID 1 MG: 1 TABLET ORAL at 09:08

## 2022-08-13 RX ADMIN — ONDANSETRON 4 MG: 2 INJECTION INTRAMUSCULAR; INTRAVENOUS at 03:08

## 2022-08-13 RX ADMIN — PIPERACILLIN AND TAZOBACTAM 4.5 G: 4; .5 INJECTION, POWDER, LYOPHILIZED, FOR SOLUTION INTRAVENOUS; PARENTERAL at 08:08

## 2022-08-13 RX ADMIN — KETOROLAC TROMETHAMINE 15 MG: 30 INJECTION, SOLUTION INTRAMUSCULAR; INTRAVENOUS at 09:08

## 2022-08-13 RX ADMIN — THERA TABS 1 TABLET: TAB at 09:08

## 2022-08-13 RX ADMIN — SUCCINYLCHOLINE CHLORIDE 100 MG: 20 INJECTION, SOLUTION INTRAMUSCULAR; INTRAVENOUS at 03:08

## 2022-08-13 RX ADMIN — ETOMIDATE 20 MG: 2 INJECTION INTRAVENOUS at 03:08

## 2022-08-13 RX ADMIN — PIPERACILLIN AND TAZOBACTAM 4.5 G: 4; .5 INJECTION, POWDER, LYOPHILIZED, FOR SOLUTION INTRAVENOUS; PARENTERAL at 01:08

## 2022-08-13 RX ADMIN — SODIUM CHLORIDE, POTASSIUM CHLORIDE, SODIUM LACTATE AND CALCIUM CHLORIDE: 600; 310; 30; 20 INJECTION, SOLUTION INTRAVENOUS at 07:08

## 2022-08-13 RX ADMIN — OCTREOTIDE ACETATE 50 MCG/HR: 500 INJECTION, SOLUTION INTRAVENOUS; SUBCUTANEOUS at 07:08

## 2022-08-13 NOTE — ANESTHESIA PROCEDURE NOTES
Intubation    Date/Time: 8/13/2022 3:21 PM  Performed by: Bryce Box CRNA  Authorized by: Nikolas Horn MD     Intubation:     Induction:  Rapid sequence induction    Intubated:  Postinduction    Attempts:  1    Attempted By:  CRNA    Method of Intubation:  Direct    Blade:  Pisano 2    Laryngeal View Grade: Grade I - full view of cords      Difficult Airway Encountered?: No      Complications:  None    Airway Device:  Oral endotracheal tube    Airway Device Size:  7.5    Style/Cuff Inflation:  Cuffed (inflated to minimal occlusive pressure)    Inflation Amount (mL):  6    Tube secured:  22    Secured at:  The lips    Placement Verified By:  Capnometry    Complicating Factors:  None    Findings Post-Intubation:  BS equal bilateral and atraumatic/condition of teeth unchanged

## 2022-08-13 NOTE — TRANSFER OF CARE
"Anesthesia Transfer of Care Note    Patient: Yong Townsend    Procedure(s) Performed: Procedure(s) (LRB):  EGD (ESOPHAGOGASTRODUODENOSCOPY) (N/A)    Patient location: PACU    Anesthesia Type: general    Transport from OR: Transported from OR on room air with adequate spontaneous ventilation    Post pain: adequate analgesia    Post assessment: no apparent anesthetic complications    Post vital signs: stable    Level of consciousness: sedated    Nausea/Vomiting: no nausea/vomiting    Complications: none    Transfer of care protocol was followed      Last vitals:   Visit Vitals  BP (!) 125/93 (BP Location: Left arm, Patient Position: Lying)   Pulse 93   Temp 36.5 °C (97.7 °F) (Skin)   Resp 19   Ht 5' 11.5" (1.816 m)   Wt 89.9 kg (198 lb 3.1 oz)   SpO2 98%   BMI 27.26 kg/m²     "

## 2022-08-13 NOTE — PROVATION PATIENT INSTRUCTIONS
Discharge Summary/Instructions after an Endoscopic Procedure  Patient Name: Yong Townsend  Patient MRN: [id number]  Patient YOB: 1965 Saturday, August 13, 2022  Anna Rodriguez III, MD  Dear patient,  As a result of recent federal legislation (The Federal Cures Act), you may   receive lab or pathology results from your procedure in your [facility]   account before your physician is able to contact you. Your physician or   their representative will relay the results to you with their   recommendations at their soonest availability.  Thank you,  RESTRICTIONS:  During your procedure today, you received medications for sedation.  These   medications may affect your judgment, balance and coordination.  Therefore,   for 24 hours, you have the following restrictions:   - DO NOT drive a car, operate machinery, make legal/financial decisions,   sign important papers or drink alcohol.    ACTIVITY:  Today: no heavy lifting, straining or running due to procedural   sedation/anesthesia.  The following day: return to full activity including work.  DIET:  Eat and drink normally unless instructed otherwise.     TREATMENT FOR COMMON SIDE EFFECTS:  - Mild abdominal pain, nausea, belching, bloating or excessive gas:  rest,   eat lightly and use a heating pad.  - Sore Throat: treat with throat lozenges and/or gargle with warm salt   water.  - Because air was used during the procedure, expelling large amounts of air   from your rectum or belching is normal.  - If a bowel prep was taken, you may not have a bowel movement for 1-3 days.    This is normal.  SYMPTOMS TO WATCH FOR AND REPORT TO YOUR PHYSICIAN:  1. Abdominal pain or bloating, other than gas cramps.  2. Chest pain.  3. Back pain.  4. Signs of infection such as: chills or fever occurring within 24 hours   after the procedure.  5. Rectal bleeding, which would show as bright red, maroon, or black stools.   (A tablespoon of blood from the rectum is not serious,  especially if   hemorrhoids are present.)  6. Vomiting.  7. Weakness or dizziness.  GO DIRECTLY TO THE NEAREST EMERGENCY ROOM IF YOU HAVE ANY OF THE FOLLOWING:      Difficulty breathing              Chills and/or fever over 101 F   Persistent vomiting and/or vomiting blood   Severe abdominal pain   Severe chest pain   Black, tarry stools   Bleeding- more than one tablespoon   Any other symptom or condition that you feel may need urgent attention  Your doctor recommends these additional instructions:  If any biopsies were taken, your doctors clinic will contact you in 1 to 2   weeks with any results.  - Observe patient's clinical course.  For questions, problems or results please call your physician - Anna Rodriguez III, MD at Work:  [phone].  [facility], EMERGENCY ROOM PHONE NUMBER: [phone]  IF A COMPLICATION OR EMERGENCY SITUATION ARISES AND YOU ARE UNABLE TO REACH   YOUR PHYSICIAN - GO DIRECTLY TO THE EMERGENCY ROOM.  Anna Rodriguez III, MD  8/13/2022 3:37:25 PM  This report has been verified and signed electronically.  Dear patient,  As a result of recent federal legislation (The Federal Cures Act), you may   receive lab or pathology results from your procedure in your [facility]   account before your physician is able to contact you. Your physician or   their representative will relay the results to you with their   recommendations at their soonest availability.  Thank you,  PROVATION

## 2022-08-13 NOTE — ANESTHESIA PREPROCEDURE EVALUATION
08/13/2022  Yong Townsend is a 57 y.o., male.      Pre-op Assessment    I have reviewed the Patient Summary Reports.     I have reviewed the Nursing Notes. I have reviewed the NPO Status.   I have reviewed the Medications.     Review of Systems  Social:  Alcohol Use    Hematology/Oncology:         -- Anemia:   Hepatic/GI:   Liver Disease,  Liver Disease, Alcoholic Liver Disease , Cirrhosis Portal Hypertension, Esophageal Varices, Awaiting Liver Transplant    Endocrine:  Metabolic Disorders, Hypoalbuminemia      Physical Exam  General: Well nourished    Airway:  Mallampati: II / II  Mouth Opening: Normal  TM Distance: Normal  Tongue: Normal  Neck ROM: Normal ROM    Dental:  Intact    Chest/Lungs:  Clear to auscultation    Heart:  Rate: Normal        Anesthesia Plan  Type of Anesthesia, risks & benefits discussed:    Anesthesia Type: Gen ETT, MAC  Intra-op Monitoring Plan: Standard ASA Monitors  Post Op Pain Control Plan: multimodal analgesia  Induction:  IV  Airway Plan: Direct  Informed Consent: Informed consent signed with the Patient and all parties understand the risks and agree with anesthesia plan.  All questions answered. Patient consented to blood products? Yes  ASA Score: 4 Emergent    Ready For Surgery From Anesthesia Perspective.     .

## 2022-08-13 NOTE — H&P
[facility]  Hospital Medicine History & Physical Examination       Patient Name: Yong Townsend  MRN: [id number]  Patient Class: IP- Inpatient   Admission Date: 8/13/2022   Admitting Physician: JOSEY Service   Length of Stay: 0  Attending Physician: Dr. Lee  Primary Care Provider: Primary Doctor No  Face-to-Face encounter date: 08/13/2022  Code Status: Full Code      Patient information was obtained from patient, patient's family, past medical records and ER records.     HISTORY OF PRESENT ILLNESS:   Yong Townsend is a 57 y.o. male who  has a past medical history of Alcoholic cirrhosis and Esophageal varices.. The patient presented to [facility] on 8/13/2022 with a primary complaint of GI bleed.  Patient is a transfer from [facility].  Patient reports he resides in [place] but comes to [place], [place] to see Dr. Lin for his appointments.  Patient has a history of esophageal varices and reports is currently having bloody BMs.  He reports he was diagnosed with cirrhosis a few years ago. He states that he stops drinking intermittently and then relapses.  He states his alcoholic beverage of choice is whiskey. He reports when he does consume alcohol, he binge drinks.  He started again which consuming EtOH approximately 3 months ago.  He also reports recreational drug use of marijuana and coke.    From previous facility:   8/12/22:  H&H 8.9/26.6  Plt 11.3  Ammonia 59 (H)  Lactic acid 6.2 (H)  Ethanol 245  CT abd/pelvis reveals cirrhotic liver morphology with secondary findings of portal HTN including splenomegaly, prominent portosystemic collaterals, and diffuse mesenteric stranding. No significant ascites.  Choleithiasis with gallbladder wall thickening. The gallbladder wall thickening could be associated with portal HTN. Colonic diverticulosis.     PAST MEDICAL HISTORY:     Past Medical History:   Diagnosis Date    Alcoholic cirrhosis     Esophageal varices         PAST SURGICAL HISTORY:     Past Surgical History:   Procedure Laterality Date    KNEE SURGERY Left        ALLERGIES:   Patient has no known allergies.    FAMILY HISTORY:   Reviewed and negative    SOCIAL HISTORY:     Social History     Tobacco Use    Smoking status: Never Smoker    Smokeless tobacco: Never Used   Substance Use Topics    Alcohol use: Not on file    ETOH use intermittently    HOME MEDICATIONS:     Prior to Admission medications    Medication Sig Start Date End Date Taking? Authorizing Provider   ergocalciferol (ERGOCALCIFEROL) 50,000 unit Cap Take 50,000 Units by mouth every 7 days. 5/27/22  Yes Historical Provider   folic acid (FOLVITE) 1 MG tablet Take 1 tablet (1,000 mcg total) by mouth every morning. 1/10/22  Yes Lula Burk MD   magnesium oxide (MAG-OX) 400 mg (241.3 mg magnesium) tablet Take 1 tablet by mouth 2 (two) times daily. 3/9/22  Yes Historical Provider   pantoprazole (PROTONIX) 40 MG tablet Take 40 mg by mouth once daily. 3/28/22  Yes Historical Provider   potassium chloride (K-TAB) 20 mEq Take 20 mEq by mouth once daily. 3/9/22  Yes Historical Provider   propranoloL (INDERAL) 20 MG tablet Take 20 mg by mouth 2 (two) times daily. 6/7/21  Yes Historical Provider   thiamine (VITAMIN B-1) 50 MG tablet Take 50 mg by mouth once daily. 10/21/21  Yes Historical Provider   traMADoL (ULTRAM-ER) 100 MG Tb24 Take 100 mg by mouth once daily.    Historical Provider       REVIEW OF SYSTEMS:   Except as documented, all other systems reviewed and negative     PHYSICAL EXAM:     VITAL SIGNS: 24 HRS MIN & MAX LAST   Temp  Min: 98.2 °F (36.8 °C)  Max: 101.8 °F (38.8 °C) 98.2 °F (36.8 °C)   BP  Min: 112/61  Max: 148/77 121/65   Pulse  Min: 77  Max: 95  79   Resp  Min: 18  Max: 18 18   SpO2  Min: 95 %  Max: 99 % 95 %       General appearance: Well-developed, well-nourished male in no apparent distress. Mild tremors present  HENT: Atraumatic head. Moist mucous membranes of oral cavity.  Eyes:  Normal extraocular movements.   Neck: Supple.   Lungs: Clear to auscultation bilaterally. No wheezing present.   Heart: Regular rate and rhythm. S1 and S2 present with no murmurs/gallop/rub. No pedal edema. No JVD present.   Abdomen: Soft, slightly distended, non-tender. No rebound tenderness/guarding. Bowel sounds are normal.   Extremities: No cyanosis, clubbing, or edema.  Skin: No Rash.   Neuro: Motor and sensory exams grossly intact. Good tone. Muscle strength 5/5 in all 4 extremities  Psych/mental status: Appropriate mood and affect. Responds appropriately to questions.     LABS AND IMAGING:     Recent Labs   Lab 08/13/22  0650   WBC 5.9   RBC 2.62*   HGB 7.9*   HCT 24.2*   MCV 92.4   MCH 30.2   MCHC 32.6*   RDW 21.2*   PLT 42*       Recent Labs   Lab 08/13/22  0651      K 3.7   CO2 19*   BUN 12.6   CREATININE 0.87   CALCIUM 8.1*   MG 1.00*   ALBUMIN 2.3*   ALKPHOS 54   ALT 29   AST 73*   BILITOT 6.5*       Microbiology Results (last 7 days)     Procedure Component Value Units Date/Time    Blood Culture [[id number]] Collected: 08/13/22 1026    Order Status: Resulted Specimen: Blood, Venous Updated: 08/13/22 1036    Blood Culture #1 **CANNOT BE ORDERED STAT** [[id number]] Collected: 08/13/22 1026    Order Status: Resulted Specimen: Blood, Venous Updated: 08/13/22 1036           CT Abdomen With Without Contrast  Narrative: EXAMINATION:  CT ABDOMEN W WO CONTRAST    CLINICAL HISTORY:  organ transplant canidate;    TECHNIQUE:  Routine axial CT images of the abdomen were obtained before and after administration 100cc of IV Omnipaque 350 with arterial, portal venous phase, and delayed venous phase images obtained.  .  Coronal and Sagittal reformatted images were also obtained.    COMPARISON:  Ultrasound abdomen 06/09/2022    FINDINGS:  Partially imaged heart demonstrates no cardiomegaly or significant pericardial fluid.    Lung bases demonstrate focal consolidation or pleural fluid.    Heterogeneous normal sized  liver with nodular contour suggestive of cirrhosis.  Subcentimeter hypodensity in the left hepatic lobe (10:60) too small to characterize.  No suspicious lesions or arterial enhancing foci.  Prominent Recanalized umbilical vein.  Portal, superior mesenteric, and splenic veins are patent.  Collateral vessels in the upper abdomen.    Cholelithiasis.  Mild diffuse wall thickening of the contracted gallbladder.  No convincing evidence of acute cholecystitis. No intrahepatic or extrahepatic ductal dilatation.    Enlarged spleen measuring 14.2 cm in craniocaudal dimension.    Right adrenal gland is unremarkable.  Nonspecific mild left adrenal thickening.    Kidneys are normal in size and location with symmetric enhancement.  No hydronephrosis or nephrolithiasis.    Distal esophagus and stomach are unremarkable.  Visualized small and large bowel loops demonstrate no obstruction or focal inflammation.  Appendix is unremarkable.  Generalized mesenteric stranding and edema.    No significant volume intraperitoneal free fluid.  No intraperitoneal free air.    Increased number of normal-sized retroperitoneal and mesenteric lymph nodes.    Prominent anterior abdominal wall subcutaneous varicosities from the umbilicus.    No aneurysm.  No significant atherosclerosis.    Advanced degenerative changes at the L4-L5 level with endplate sclerosis and vacuum disc phenomena.  No suspicious osseous lesions.  Impression: Cirrhotic liver morphology.  No suspicious enhancing lesions.    Sequela of portal hypertension including splenomegaly and recanalized umbilical vein.    Cholelithiasis without convincing evidence of acute cholecystitis.    Nonspecific mild left adrenal thickening.    Electronically signed by resident: Brody Pierce  Date:    08/04/2022  Time:    09:00    Electronically signed by: Sedrick Marroquin MD  Date:    08/04/2022  Time:    09:37        ASSESSMENT & PLAN:     Anemia s/t GI bleed  Cirrhosis with EV  ETOH abuse  Cocaine  abuse     GI consult and awaiting recommendations  Continue Protonix drip and Sandostatin drip  Trend H&H for possible blood transfusion  Librium protocol due to alcohol withdrawal  Blood cultures x2 pending  COVID/flu PCR pending  Continue lactated Ringer's at 100 mL/hour  Repeat lactic acid and ammonia levels  Repeat labs in the morning    VTE Prophylaxis: will be placed on SCD for DVT prophylaxis and will be advised to be as mobile as possible and sit in a chair as tolerated    Patient condition:  Fair    __________________________________________________________________________  INPATIENT LIST OF MEDICATIONS     Scheduled Meds:   folic acid  1 mg Oral Daily    multivitamin  1 tablet Oral Daily    piperacillin-tazobactam (ZOSYN) IVPB  4.5 g Intravenous Q8H     Continuous Infusions:   lactated ringers 100 mL/hr at 08/13/22 0757    octreotide (SANDOSTATIN) infusion 50 mcg/hr (08/13/22 0757)    pantoprazole (PROTONIX) IV infusion 8 mg/hr (08/13/22 0757)     PRN Meds:.sodium chloride, acetaminophen, LORazepam, naloxone, sodium chloride 0.9%      Mile JACOBO NP have reviewed and discussed the case with Dr. Lee   Please see the following addendum for further assessment and plan from there attending MD.    08/13/2022    ________________________________________________________________________________    MD Addendum:  I, Dr. Youssef assumed care of this patient today at 10:30 am  For the patient encounter, I performed the substantive portion of the visit, I reviewed the NP/PA documentation, treatment plan, and medical decision making.  I had face to face time with this patient     A. History:  Patient came in with c/o LGI bleed x 3 days   Also found blood in his mouth yesterday  Last bloody BM was last night   [place] past [place]. He was drinking alcohol and doing cocaine  He was feeling last night   Today has fever. Denies any cough, chest pain chills or dysuria or abdominal pain    B.  Physical exam:  Heart RRR  Lungs clear   Abdomen soft and non tender   No FND     C. Medical decision making:  Patients labs and vitals reviewed  Will need CT abdomen with contrast to assess for bleeding   His lactic acid is also elevated and now has a fever   Will start on IV zosyn   Will cont IV fluids   Keep NPO for now   F/U by GI team  Cont IV sandostatin and PPI  Monitor for bleeding and Hb closely   Informed nursing team to call HM team if there is any change in status  Use po prn ativan for withdrawal symptoms     Labs in am    VTE prophylaxis SCDS    Bedrest     Condition Guarded    If he starts to have active GI bleeding will have to move to the ICU    Discharge Planning and Disposition:   All diagnosis and differential diagnosis have been reviewed; assessment and plan has been documented; I have personally reviewed the labs and test results that are presently available; I have reviewed the patients medication list; I have reviewed the consulting providers response and recommendations. I have reviewed or attempted to review medical records based upon their availability.    All of the patient and family questions have been addressed and answered. Patient's is agreeable to the above stated plan. I will continue to monitor closely and make adjustments to medical management as needed.      Jim Lee MD   08/13/2022

## 2022-08-13 NOTE — ANESTHESIA POSTPROCEDURE EVALUATION
Anesthesia Post Evaluation    Patient: Yong Townsend    Procedure(s) Performed: Procedure(s) (LRB):  EGD (ESOPHAGOGASTRODUODENOSCOPY) (N/A)  EGD,WITH HEMORRHAGE CONTROL    Final Anesthesia Type: general      Patient location during evaluation: PACU  Patient participation: Yes- Able to Participate  Level of consciousness: awake and alert  Post-procedure vital signs: reviewed and stable  Pain management: adequate  Airway patency: patent  TC mitigation strategies: Multimodal analgesia  PONV status at discharge: No PONV  Anesthetic complications: no      Cardiovascular status: blood pressure returned to baseline and hemodynamically stable  Respiratory status: unassisted and spontaneous ventilation  Hydration status: euvolemic  Follow-up not needed.          Vitals Value Taken Time   /67 08/13/22 1604   Temp 36.5 °C (97.7 °F) 08/13/22 1544   Pulse 87 08/13/22 1604   Resp 20 08/13/22 1604   SpO2 96 % 08/13/22 1604         Event Time   Out of Recovery 15:45:00         Pain/Jean Carlos Score: Pain Rating Prior to Med Admin: 8 (8/13/2022  9:22 AM)  Jean Carlos Score: 10 (8/13/2022  3:54 PM)

## 2022-08-13 NOTE — CONSULTS
Gastroenterology Consultation Note    Reason for Consult:  Diagnoses of GI bleed and Chest pain were pertinent to this visit.    PCP:   Primary Doctor No    Referring MD:  Miko Wilkinson Md  [address] [id number]    Mile Gamez KYLAH    Hospital Day: 0     Initial History of Present Illness (HPI):  This is a 57 y.o. male was transferred from [facility] in [place] for GI bleed presenting as bloody BMs.   Patient awake from sleep to find a large amount of dark blood covering the sheet. He could not determine the origin of the blood.   He experienced no GI symptoms prior to the episode except for occasional bright red blood with stool.    He had a prior large volume bleed in 2018 and esophageal varies were banded.    From previous facility:   8/12/22:  H&H 8.9/26.6  Plt 38  Ammonia 59 (H)  Lactic acid 6.2 (H)  Ethanol 245  UDS positive for cocaine  Large amount of blood in urine    CT abd/pelvis reveals cirrhotic liver morphology with secondary findings of portal HTN including splenomegaly, prominent portosystemic collaterals, and diffuse mesenteric stranding. No significant ascites.  Choleithiasis with gallbladder wall thickening. The gallbladder wall thickening could be associated with portal HTN. Colonic diverticulosis.    Followed in our practice by Dr. Mauro Lin and Shana Araya, NP for GERD and cirrhosis. He has fatty liver and hx of ETOH  Abuse.  Treated with PPI, propranolol  Last seen in office 6/6/2022  EGD: 6/7/22: Scar in lower esophagus from prior esophageal banding. Grade II esophageal varices which flattened with insufflation. Portal hypertensive gastropathy.   Varices were banded in 2018.  [facility] [place] [facility]: [facility]  US Feb 2022: recannulization of umbilical vein, hepatic steatosis. Cholelithiasis. Splenomegaly  Etoh rehab in 2018 but Still drinks whiskey socially. Uses coke and marijuana.    He plans on returning to [place] to  enter rehab.    Also followed by Dr. Sisi Whalen and he had been evaluated by a transplant team.      Review of Systems   Constitutional: Positive for fever.   HENT: Negative for nosebleeds.    Gastrointestinal: Positive for blood in stool. Negative for abdominal pain, heartburn, nausea and vomiting.   Genitourinary: Positive for hematuria.   Neurological: Positive for weakness.   Endo/Heme/Allergies: Bruises/bleeds easily.   All other systems reviewed and are negative.      Medical History:   Past Medical History:   Diagnosis Date    Alcoholic cirrhosis     Esophageal varices    HTN, colon polyps    Surgical History:   Past Surgical History:   Procedure Laterality Date    KNEE SURGERY Left        Family History:   No family history on file..     Social History:   ETOH: current use, hx of abuse  Drugs: Cocaine use, cannabis use    Allergies:  Review of patient's allergies indicates:  No Known Allergies    Medications Prior to Admission   Medication Sig Dispense Refill Last Dose    ergocalciferol (ERGOCALCIFEROL) 50,000 unit Cap Take 50,000 Units by mouth every 7 days.   Past Week at Unknown time    folic acid (FOLVITE) 1 MG tablet Take 1 tablet (1,000 mcg total) by mouth every morning. 30 tablet 11 Past Week at Unknown time    magnesium oxide (MAG-OX) 400 mg (241.3 mg magnesium) tablet Take 1 tablet by mouth 2 (two) times daily.   Past Month at Unknown time    pantoprazole (PROTONIX) 40 MG tablet Take 40 mg by mouth once daily.   Past Week at Unknown time    potassium chloride (K-TAB) 20 mEq Take 20 mEq by mouth once daily.   Past Week at Unknown time    propranoloL (INDERAL) 20 MG tablet Take 20 mg by mouth 2 (two) times daily.   Past Week at Unknown time    thiamine (VITAMIN B-1) 50 MG tablet Take 50 mg by mouth once daily.   Past Week at Unknown time    traMADoL (ULTRAM-ER) 100 MG Tb24 Take 100 mg by mouth once daily.   Unknown at Unknown time         Objective Findings:    Vital Signs:  /70    "Pulse 95   Temp (!) 101.8 °F (38.8 °C)   Resp 18   Ht 5' 11.5" (1.816 m)   Wt 89.9 kg (198 lb 3.1 oz)   SpO2 97%   BMI 27.26 kg/m²   Body mass index is 27.26 kg/m².    Physical Exam:  Physical Exam  Constitutional:       Appearance: He is ill-appearing.   HENT:      Head: Normocephalic and atraumatic.   Eyes:      Extraocular Movements: Extraocular movements intact.      Conjunctiva/sclera: Conjunctivae normal.   Cardiovascular:      Rate and Rhythm: Normal rate and regular rhythm.      Pulses: Normal pulses.      Heart sounds: Normal heart sounds.   Pulmonary:      Effort: Pulmonary effort is normal.      Breath sounds: Normal breath sounds.   Abdominal:      General: Abdomen is flat. Bowel sounds are normal.      Palpations: Abdomen is soft.      Tenderness: There is no abdominal tenderness.   Musculoskeletal:         General: Normal range of motion.      Cervical back: Normal range of motion and neck supple.   Skin:     General: Skin is warm and dry.   Neurological:      Mental Status: He is alert.      Motor: Tremor present.   Psychiatric:         Behavior: Behavior is slowed.         Labs:  Recent Results (from the past 48 hour(s))   PT/INR    Collection Time: 08/13/22  6:50 AM   Result Value Ref Range    PT 23.4 (H) 12.5 - 14.5 seconds    INR 2.13 (H) 0.00 - 1.30   PTT    Collection Time: 08/13/22  6:50 AM   Result Value Ref Range    PTT 42.3 (H) 23.2 - 33.7 seconds   CBC with Differential    Collection Time: 08/13/22  6:50 AM   Result Value Ref Range    WBC 5.9 4.5 - 11.5 x10(3)/mcL    RBC 2.62 (L) 4.70 - 6.10 x10(6)/mcL    Hgb 7.9 (L) 14.0 - 18.0 gm/dL    Hct 24.2 (L) 42.0 - 52.0 %    MCV 92.4 80.0 - 94.0 fL    MCH 30.2 27.0 - 31.0 pg    MCHC 32.6 (L) 33.0 - 36.0 mg/dL    RDW 21.2 (H) 11.5 - 17.0 %    Platelet 42 (L) 130 - 400 x10(3)/mcL    MPV      IG# 0.08 (H) 0 - 0.04 x10(3)/mcL    IG% 1.3 %    NRBC% 0.0 %   Manual Differential    Collection Time: 08/13/22  6:50 AM   Result Value Ref Range    Neut " Man 96 %    Lymph Man 1 %    Monocyte Man 3 %    Myelo Man 1 %    Instr WBC 5.9 x10(3)/mcL    Abs Mono 0.177 0.1 - 1.3 x10(3)/mcL    Abs Lymp 0.059 (L) 0.6 - 4.6 x10(3)/mcL    Abs Neut 5.723 2.1 - 9.2 x10(3)/mcL    RBC Morph Abnormal (A) Normal    Anisocyte 1+ (A) (none)    Poik 1+ (A) (none)    Macrocyte 1+ (A) (none)    Target Cell 1+ (A) (none)    Platelet Est Decreased (A) Normal, Adequate   Comprehensive Metabolic Panel (CMP)    Collection Time: 08/13/22  6:51 AM   Result Value Ref Range    Sodium Level 140 136 - 145 mmol/L    Potassium Level 3.7 3.5 - 5.1 mmol/L    Chloride 110 (H) 98 - 107 mmol/L    Carbon Dioxide 19 (L) 22 - 29 mmol/L    Glucose Level 118 (H) 74 - 100 mg/dL    Blood Urea Nitrogen 12.6 8.4 - 25.7 mg/dL    Creatinine 0.87 0.73 - 1.18 mg/dL    Calcium Level Total 8.1 (L) 8.4 - 10.2 mg/dL    Protein Total 6.3 (L) 6.4 - 8.3 gm/dL    Albumin Level 2.3 (L) 3.5 - 5.0 gm/dL    Globulin 4.0 (H) 2.4 - 3.5 gm/dL    Albumin/Globulin Ratio 0.6 (L) 1.1 - 2.0 ratio    Bilirubin Total 6.5 (H) <=1.5 mg/dL    Alkaline Phosphatase 54 40 - 150 unit/L    Alanine Aminotransferase 29 0 - 55 unit/L    Aspartate Aminotransferase 73 (H) 5 - 34 unit/L    eGFR >60 mls/min/1.73/m2   Magnesium    Collection Time: 08/13/22  6:51 AM   Result Value Ref Range    Magnesium Level 1.00 (L) 1.60 - 2.60 mg/dL   Phosphorus    Collection Time: 08/13/22  6:51 AM   Result Value Ref Range    Phosphorus Level 2.8 2.3 - 4.7 mg/dL   Lactic Acid, Plasma    Collection Time: 08/13/22  8:47 AM   Result Value Ref Range    Lactic Acid Level 5.0 (HH) 0.5 - 2.2 mmol/L   Type & Screen    Collection Time: 08/13/22  8:47 AM   Result Value Ref Range    Group & Rh A POS     Indirect Benito GEL NEG    Ammonia    Collection Time: 08/13/22 10:26 AM   Result Value Ref Range    Ammonia Level 78.9 (H) 18.0 - 72.0 umol/L   COVID/FLU A&B PCR    Collection Time: 08/13/22 10:58 AM   Result Value Ref Range    Influenza A PCR Not Detected Not Detected     Influenza B PCR Not Detected Not Detected    SARS-CoV-2 PCR Not Detected Not Detected       CT: Cirrhotic liver with secondary findings of portal hypertension. No ascites. Cholelithiasis with gallbladder wall thickening. Colonic diverticulosis.      Assessment/Plan:  Anemia due to acute gi bleed   Hgb 7.9gm   EGD today   Platelet transfusion  ETOH cirrhosis   MELD 22. Will need to complete rehab and be ETOH free for at least 6 months to be considered for transplant.   Coagulopathy: Plt ct 42,000, INR 2.13   No ascites.   Esophageal varices: grade II in June. Last banded in 2018   HE / ammonia 78.9  Fever    On Zosyn    The documentation recorded by the scribe/NP accurately reflects the service I personally performed and the decisions made by me.       Jacque F Noel Iii, MD [facility]    Thank you for allowing us to participate in the care of Yong Townsend.    Lillian Jimenez NP, DbS acting as scribe for Anna Rodriguez III, MD  Gastroenterology  [facility]

## 2022-08-13 NOTE — ANESTHESIA RELEASE NOTE
"Anesthesia Release from PACU Note    Patient: Yong Townsend    Procedure(s) Performed: Procedure(s) (LRB):  EGD (ESOPHAGOGASTRODUODENOSCOPY) (N/A)  EGD,WITH HEMORRHAGE CONTROL    Anesthesia type: general    Post pain: Adequate analgesia    Post assessment: no apparent anesthetic complications    Last Vitals:   Visit Vitals  /69   Pulse 79   Temp 36.9 °C (98.4 °F) (Oral)   Resp 20   Ht 5' 11.5" (1.816 m)   Wt 89.9 kg (198 lb 3.1 oz)   SpO2 (!) 93%   BMI 27.26 kg/m²       Post vital signs: stable    Level of consciousness: awake, alert  and oriented    Nausea/Vomiting: no nausea/no vomiting    Complications: none    Airway Patency: patent    Respiratory: unassisted, spontaneous ventilation, room air    Cardiovascular: stable and blood pressure at baseline    Hydration: euvolemic  "

## 2022-08-14 LAB
ABO + RH BLD: NORMAL
ABS NEUT (OLG): 2.58 X10(3)/MCL (ref 2.1–9.2)
AFP-TM SERPL-MCNC: 2.5 NG/ML
ALBUMIN SERPL-MCNC: 2.2 GM/DL (ref 3.5–5)
ALBUMIN/GLOB SERPL: 0.6 RATIO (ref 1.1–2)
ALP SERPL-CCNC: 40 UNIT/L (ref 40–150)
ALT SERPL-CCNC: 32 UNIT/L (ref 0–55)
ANISOCYTOSIS BLD QL SMEAR: ABNORMAL
AST SERPL-CCNC: 103 UNIT/L (ref 5–34)
BASOPHILS NFR BLD MANUAL: 0.06 X10(3)/MCL (ref 0–0.2)
BASOPHILS NFR BLD MANUAL: 2 %
BILIRUBIN DIRECT+TOT PNL SERPL-MCNC: 6 MG/DL
BLD PROD TYP BPU: NORMAL
BLOOD UNIT EXPIRATION DATE: NORMAL
BLOOD UNIT TYPE CODE: 600
BLOOD UNIT TYPE CODE: 600
BLOOD UNIT TYPE CODE: 7300
BUN SERPL-MCNC: 22 MG/DL (ref 8.4–25.7)
CALCIUM SERPL-MCNC: 7.9 MG/DL (ref 8.4–10.2)
CHLORIDE SERPL-SCNC: 109 MMOL/L (ref 98–107)
CO2 SERPL-SCNC: 23 MMOL/L (ref 22–29)
CREAT SERPL-MCNC: 1.1 MG/DL (ref 0.73–1.18)
CROSSMATCH INTERPRETATION: NORMAL
DISPENSE STATUS: NORMAL
EOSINOPHIL NFR BLD MANUAL: 0.03 X10(3)/MCL (ref 0–0.9)
EOSINOPHIL NFR BLD MANUAL: 1 %
ERYTHROCYTE [DISTWIDTH] IN BLOOD BY AUTOMATED COUNT: 21.2 % (ref 11.5–17)
GFR SERPLBLD CREATININE-BSD FMLA CKD-EPI: >60 MLS/MIN/1.73/M2
GLOBULIN SER-MCNC: 3.7 GM/DL (ref 2.4–3.5)
GLUCOSE SERPL-MCNC: 104 MG/DL (ref 74–100)
HCT VFR BLD AUTO: 23.2 % (ref 42–52)
HGB BLD-MCNC: 7.7 GM/DL (ref 14–18)
HIV 1+2 AB+HIV1 P24 AG SERPL QL IA: NONREACTIVE
HYPOCHROMIA BLD QL SMEAR: ABNORMAL
IMM GRANULOCYTES # BLD AUTO: 0.02 X10(3)/MCL (ref 0–0.04)
IMM GRANULOCYTES NFR BLD AUTO: 0.6 %
INSTRUMENT WBC (OLG): 3 X10(3)/MCL
LACTATE SERPL-SCNC: 1.8 MMOL/L (ref 0.5–2.2)
LACTATE SERPL-SCNC: 2.7 MMOL/L (ref 0.5–2.2)
LYMPHOCYTES NFR BLD MANUAL: 0.27 X10(3)/MCL
LYMPHOCYTES NFR BLD MANUAL: 9 %
MACROCYTES BLD QL SMEAR: ABNORMAL
MCH RBC QN AUTO: 30.1 PG (ref 27–31)
MCHC RBC AUTO-ENTMCNC: 33.2 MG/DL (ref 33–36)
MCV RBC AUTO: 90.6 FL (ref 80–94)
METAMYELOCYTES NFR BLD MANUAL: 1 %
MONOCYTES NFR BLD MANUAL: 0.09 X10(3)/MCL (ref 0.1–1.3)
MONOCYTES NFR BLD MANUAL: 3 %
NEUTROPHILS NFR BLD MANUAL: 85 %
NRBC BLD AUTO-RTO: 0 %
PLATELET # BLD AUTO: 41 X10(3)/MCL (ref 130–400)
PLATELET # BLD EST: ABNORMAL 10*3/UL
PMV BLD AUTO: ABNORMAL FL
POIKILOCYTOSIS BLD QL SMEAR: ABNORMAL
POLYCHROMASIA BLD QL SMEAR: ABNORMAL
POTASSIUM SERPL-SCNC: 3.2 MMOL/L (ref 3.5–5.1)
PROT SERPL-MCNC: 5.9 GM/DL (ref 6.4–8.3)
RBC # BLD AUTO: 2.56 X10(6)/MCL (ref 4.7–6.1)
RBC MORPH BLD: ABNORMAL
SODIUM SERPL-SCNC: 138 MMOL/L (ref 136–145)
UNIT NUMBER: NORMAL
WBC # SPEC AUTO: 3.3 X10(3)/MCL (ref 4.5–11.5)

## 2022-08-14 PROCEDURE — 21400001 HC TELEMETRY ROOM

## 2022-08-14 PROCEDURE — 25000003 PHARM REV CODE 250: Performed by: NURSE PRACTITIONER

## 2022-08-14 PROCEDURE — 87389 HIV-1 AG W/HIV-1&-2 AB AG IA: CPT | Performed by: NURSE PRACTITIONER

## 2022-08-14 PROCEDURE — 85025 COMPLETE CBC W/AUTO DIFF WBC: CPT | Performed by: NURSE PRACTITIONER

## 2022-08-14 PROCEDURE — 27000207 HC ISOLATION

## 2022-08-14 PROCEDURE — P9016 RBC LEUKOCYTES REDUCED: HCPCS | Performed by: INTERNAL MEDICINE

## 2022-08-14 PROCEDURE — 63600175 PHARM REV CODE 636 W HCPCS: Performed by: INTERNAL MEDICINE

## 2022-08-14 PROCEDURE — 36415 COLL VENOUS BLD VENIPUNCTURE: CPT | Performed by: NURSE PRACTITIONER

## 2022-08-14 PROCEDURE — 36430 TRANSFUSION BLD/BLD COMPNT: CPT

## 2022-08-14 PROCEDURE — C9113 INJ PANTOPRAZOLE SODIUM, VIA: HCPCS | Performed by: NURSE PRACTITIONER

## 2022-08-14 PROCEDURE — 11000001 HC ACUTE MED/SURG PRIVATE ROOM

## 2022-08-14 PROCEDURE — P9035 PLATELET PHERES LEUKOREDUCED: HCPCS | Performed by: INTERNAL MEDICINE

## 2022-08-14 PROCEDURE — 25000003 PHARM REV CODE 250: Performed by: INTERNAL MEDICINE

## 2022-08-14 PROCEDURE — 80053 COMPREHEN METABOLIC PANEL: CPT | Performed by: NURSE PRACTITIONER

## 2022-08-14 PROCEDURE — 83605 ASSAY OF LACTIC ACID: CPT | Performed by: NURSE PRACTITIONER

## 2022-08-14 PROCEDURE — 63600175 PHARM REV CODE 636 W HCPCS: Performed by: NURSE PRACTITIONER

## 2022-08-14 PROCEDURE — 82105 ALPHA-FETOPROTEIN SERUM: CPT | Performed by: NURSE PRACTITIONER

## 2022-08-14 RX ORDER — HYDROCODONE BITARTRATE AND ACETAMINOPHEN 500; 5 MG/1; MG/1
TABLET ORAL
Status: DISCONTINUED | OUTPATIENT
Start: 2022-08-14 | End: 2022-08-16 | Stop reason: HOSPADM

## 2022-08-14 RX ADMIN — PANTOPRAZOLE SODIUM 8 MG/HR: 40 INJECTION, POWDER, FOR SOLUTION INTRAVENOUS at 10:08

## 2022-08-14 RX ADMIN — PIPERACILLIN AND TAZOBACTAM 4.5 G: 4; .5 INJECTION, POWDER, LYOPHILIZED, FOR SOLUTION INTRAVENOUS; PARENTERAL at 07:08

## 2022-08-14 RX ADMIN — OCTREOTIDE ACETATE 50 MCG/HR: 500 INJECTION, SOLUTION INTRAVENOUS; SUBCUTANEOUS at 10:08

## 2022-08-14 RX ADMIN — PIPERACILLIN AND TAZOBACTAM 4.5 G: 4; .5 INJECTION, POWDER, LYOPHILIZED, FOR SOLUTION INTRAVENOUS; PARENTERAL at 04:08

## 2022-08-14 RX ADMIN — PANTOPRAZOLE SODIUM 8 MG/HR: 40 INJECTION, POWDER, FOR SOLUTION INTRAVENOUS at 12:08

## 2022-08-14 RX ADMIN — FOLIC ACID 1 MG: 1 TABLET ORAL at 08:08

## 2022-08-14 RX ADMIN — SODIUM CHLORIDE, POTASSIUM CHLORIDE, SODIUM LACTATE AND CALCIUM CHLORIDE: 600; 310; 30; 20 INJECTION, SOLUTION INTRAVENOUS at 12:08

## 2022-08-14 RX ADMIN — THERA TABS 1 TABLET: TAB at 08:08

## 2022-08-14 RX ADMIN — PANTOPRAZOLE SODIUM 8 MG/HR: 40 INJECTION, POWDER, FOR SOLUTION INTRAVENOUS at 06:08

## 2022-08-14 RX ADMIN — PANTOPRAZOLE SODIUM 8 MG/HR: 40 INJECTION, POWDER, FOR SOLUTION INTRAVENOUS at 11:08

## 2022-08-14 RX ADMIN — Medication 650 MG: at 12:08

## 2022-08-14 RX ADMIN — PIPERACILLIN AND TAZOBACTAM 4.5 G: 4; .5 INJECTION, POWDER, LYOPHILIZED, FOR SOLUTION INTRAVENOUS; PARENTERAL at 12:08

## 2022-08-14 RX ADMIN — OCTREOTIDE ACETATE 50 MCG/HR: 500 INJECTION, SOLUTION INTRAVENOUS; SUBCUTANEOUS at 11:08

## 2022-08-14 RX ADMIN — OCTREOTIDE ACETATE 50 MCG/HR: 500 INJECTION, SOLUTION INTRAVENOUS; SUBCUTANEOUS at 12:08

## 2022-08-14 NOTE — PLAN OF CARE
Problem: Adult Inpatient Plan of Care  Goal: Plan of Care Review  Outcome: Ongoing, Progressing  Flowsheets (Taken 8/14/2022 0032)  Plan of Care Reviewed With: patient  Goal: Optimal Comfort and Wellbeing  Outcome: Ongoing, Progressing  Intervention: Monitor Pain and Promote Comfort  Flowsheets (Taken 8/14/2022 0032)  Pain Management Interventions:   position adjusted   pillow support provided   quiet environment facilitated  Intervention: Provide Person-Centered Care  Flowsheets (Taken 8/14/2022 0032)  Trust Relationship/Rapport:   questions encouraged   care explained   reassurance provided   choices provided   emotional support provided   thoughts/feelings acknowledged   empathic listening provided   questions answered     Problem: Fall Injury Risk  Goal: Absence of Fall and Fall-Related Injury  Outcome: Ongoing, Progressing  Intervention: Identify and Manage Contributors  Flowsheets (Taken 8/14/2022 0032)  Self-Care Promotion: independence encouraged  Medication Review/Management: medications reviewed  Intervention: Promote Injury-Free Environment  Flowsheets (Taken 8/14/2022 0032)  Safety Promotion/Fall Prevention: assistive device/personal item within reach

## 2022-08-14 NOTE — PROGRESS NOTES
[facility]  Hospital Medicine Progress Note        Chief Complaint: Inpatient Follow-up for UGI bleed     HPI:   Yong Townsend is a 57 y.o. male who  has a past medical history of Alcoholic cirrhosis and Esophageal varices.. The patient presented to [facility] on 8/13/2022 with a primary complaint of GI bleed.  Patient is a transfer from [facility].  Patient reports he resides in [place] but comes to [place], [place] to see Dr. Lin for his appointments.  Patient has a history of esophageal varices and reports is currently having bloody BMs.  He reports he was diagnosed with cirrhosis a few years ago. He states that he stops drinking intermittently and then relapses.  He states his alcoholic beverage of choice is whiskey. He reports when he does consume alcohol, he binge drinks.  He started again which consuming EtOH approximately 3 months ago.  He also reports recreational drug use of marijuana and cocaine.   He also started to spike a temp; Blood cultures done and was started on IV Zosyn. Seen by GI team and EGD was done. Showed      A Dieulafoy lesion with oozing bleeding and stigmata of recent        bleeding was found on the lesser curvature of the stomach.        Coagulation for hemostasis using argon plasma at 0.3 liters/minute        and 20 [place] was successful.        A single 5 mm angiodysplastic lesion with no bleeding was found on        the lesser curvature of the stomach. Coagulation for bleeding        prevention using argon plasma at 0.3 liters/minute and 20 washington was successful.       Interval Hx:   Patient today awake and comfortable. Had fever this morning. Denies any LGI bleeding today or last night. No chest pain, SOB, cough or palpitation.     Objective/physical exam:  General: In no acute distress, afebrile  Chest: Clear to auscultation bilaterally  Heart: RRR, +S1, S2, no appreciable murmur  Abdomen: Soft, nontender, BS +  MSK: Warm, no  lower extremity edema, no clubbing or cyanosis  Neurologic: Alert and oriented x4, Cranial nerve II-XII intact, Strength 5/5 in all 4 extremities    VITAL SIGNS: 24 HRS MIN & MAX LAST   Temp  Min: 98.2 °F (36.8 °C)  Max: 103.1 °F (39.5 °C) 99.9 °F (37.7 °C)   BP  Min: 136/78  Max: 178/89 (!) 166/88   Pulse  Min: 69  Max: 98  98   Resp  Min: 16  Max: 18 18   SpO2  Min: 90 %  Max: 97 % 96 %       Recent Labs   Lab 08/13/22  0650 08/13/22 2206 08/14/22 0220   WBC 5.9  --  3.3*   RBC 2.62*  --  2.56*   HGB 7.9* 8.3* 7.7*   HCT 24.2* 25.8* 23.2*   MCV 92.4  --  90.6   MCH 30.2  --  30.1   MCHC 32.6*  --  33.2   RDW 21.2*  --  21.2*   PLT 42*  --  41*       Recent Labs   Lab 08/13/22  0651 08/14/22 0220    138   K 3.7 3.2*   CO2 19* 23   BUN 12.6 22.0   CREATININE 0.87 1.10   CALCIUM 8.1* 7.9*   MG 1.00*  --    ALBUMIN 2.3* 2.2*   ALKPHOS 54 40   ALT 29 32   AST 73* 103*   BILITOT 6.5* 6.0*          Microbiology Results (last 7 days)     Procedure Component Value Units Date/Time    Blood Culture #1 **CANNOT BE ORDERED STAT** [[id number]]  (Normal) Collected: 08/13/22 1026    Order Status: Completed Specimen: Blood, Venous Updated: 08/14/22 1202     CULTURE, BLOOD ([facility]) No Growth At 24 Hours    Blood Culture [[id number]]  (Normal) Collected: 08/13/22 1026    Order Status: Completed Specimen: Blood, Venous Updated: 08/14/22 1202     CULTURE, BLOOD ([facility]) No Growth At 24 Hours           See below for Radiology    Scheduled Med:   folic acid  1 mg Oral Daily    multivitamin  1 tablet Oral Daily    piperacillin-tazobactam (ZOSYN) IVPB  4.5 g Intravenous Q8H        Continuous Infusions:   lactated ringers 100 mL/hr at 08/14/22 0003    octreotide (SANDOSTATIN) infusion 50 mcg/hr (08/14/22 1137)    pantoprazole (PROTONIX) IV infusion 8 mg/hr (08/14/22 1137)        PRN Meds:  sodium chloride, sodium chloride, acetaminophen, LORazepam, naloxone, sodium chloride 0.9%       Assessment/Plan:  Anemia s/t GI bleed  Fever ?  Etiology   Cirrhosis with EV  ETOH abuse  Cocaine abuse     Plan:  Patient look comfortable. No acute bleeding noticed today   EGD showed a Dieulafoy lesion with oozing.   Will cont IV sandostatin and PPI per GI team   Diet per GI team   Fever has subsided, will cont iv zosyn  Cultures so far negative   H&H >7. Will monitor   NO signs of alcohol withdrawal, monitor closely     Labs in am    VTE prophylaxis: SCDS    Patient condition:  Fair    Anticipated discharge and Disposition:         All diagnosis and differential diagnosis have been reviewed; assessment and plan has been documented; I have personally reviewed the labs and test results that are presently available; I have reviewed the patients medication list; I have reviewed the consulting providers response and recommendations. I have reviewed or attempted to review medical records based upon their availability    All of the patient's questions have been  addressed and answered. Patient's is agreeable to the above stated plan. I will continue to monitor closely and make adjustments to medical management as needed.  _____________________________________________________________________    Nutrition Status:    Radiology:  CT Abdomen Pelvis W Wo Contrast  Narrative: EXAMINATION:  CT ABDOMEN PELVIS W WO CONTRAST    CLINICAL HISTORY:  Abdominal pain, acute, nonlocalized;gi bleed;    TECHNIQUE:  Multidetector axial images were obtained of the abdomen and pelvis before and following the administration of IV contrast. Oral contrast was not  administered.    Dose length product of 2138  mGycm. Automated exposure control was utilized to minimize radiation dose.    COMPARISON:  August 3, 2022    FINDINGS:  Bilateral dependent trace of pleural effusions and lower lung zones atelectatic changes are new.    Hepatic surface nodularity and volume loss is consistent with cirrhotic morphology.  No focal hepatic space-occupying lesion.  There is splenomegaly, abdomen varices  recanalized umbilical vein.  Gallbladder several calculi.  Gallbladder wall is not thickened and there is no pericholecystic fluid.   No biliary dilation identified. Pancreatic unremarkable attenuation without acute peripancreatic phlegmons. Main pancreatic duct is not dilated.  Spleen is enlarged in size with maximum diameter of 14.5 cm.    The adrenal glands appear within normal limits. The kidneys are unremarkable in size and contour. No solid or cystic renal lesion identified. There is no hydronephrosis.  Bilateral perinephric strandings and fluid is new.    The stomach is is not dilated.  Small bowel is normal in caliber. The appendix appears unremarkable. There is noninflamed diverticulosis coli.  Pericolonic fat is preserved. There is no evidence for bowel obstruction. There is no free air or free fluid.    Urinary bladder appears within normal limits. There is no pelvic free fluid.    Marked degenerative changes at L4-L5.  No acute or otherwise osseous abnormality identified.  Impression: 1. Bilateral new trace of pleural effusions.    2. Hepatic cirrhotic morphology, varices and splenomegaly.  No ascites.    3. Several gallstones.    4.  Bilateral perinephric new inflammatory standings and fluid may be related to pyelonephritis.  Please correlate clinically.    5.  Non inflamed diverticulosis coli.    Electronically signed by: Sudhakar George  Date:    08/13/2022  Time:    19:36      Jim Lee MD   08/14/2022

## 2022-08-14 NOTE — SUBJECTIVE & OBJECTIVE
Subjective:     Interval History: Consultation 8/13/22: Initial History of Present Illness (HPI):  This is a 57 y.o. male was transferred from [facility] in [place] for GI bleed presenting as bloody BMs.   Patient awake from sleep to find a large amount of dark blood covering the sheet. He could not determine the origin of the blood.   He experienced no GI symptoms prior to the episode except for occasional bright red blood with stool.     He had a prior large volume bleed in 2018 and esophageal varies were banded.     From previous facility:   8/12/22:  H&H 8.9/26.6  Plt 38  Ammonia 59 (H)  Lactic acid 6.2 (H)  Ethanol 245  UDS positive for cocaine  Large amount of blood in urine     CT abd/pelvis reveals cirrhotic liver morphology with secondary findings of portal HTN including splenomegaly, prominent portosystemic collaterals, and diffuse mesenteric stranding. No significant ascites.  Choleithiasis with gallbladder wall thickening. The gallbladder wall thickening could be associated with portal HTN. Colonic diverticulosis.     Followed in our practice by Dr. Mauro Lin and Shana Araya, NP for GERD and cirrhosis. He has fatty liver and hx of ETOH  Abuse.  Treated with PPI, propranolol  Last seen in office 6/6/2022  EGD: 6/7/22: Scar in lower esophagus from prior esophageal banding. Grade II esophageal varices which flattened with insufflation. Portal hypertensive gastropathy.   Varices were banded in 2018.  [facility] [place] [facility]: [facility]ps  US Feb 2022: recannulization of umbilical vein, hepatic steatosis. Cholelithiasis. Splenomegaly  [facility] rehab in 2018 but Still drinks whiskey socially. Uses coke and marijuana.     He plans on returning to [place] to enter rehab.     Also followed by Dr. Sisi Whalen and he had been evaluated by a transplant team.     EGD 8/13/22:  Findings:        Localized post-therapy mucosal sclerosis was found in the lower        third of the esophagus.         A Dieulafoy lesion with oozing bleeding and stigmata of recent        bleeding was found on the lesser curvature of the stomach.        Coagulation for hemostasis using argon plasma at 0.3 liters/minute        and 20 [place] was successful.        A single 5 mm angiodysplastic lesion with no bleeding was found on        the lesser curvature of the stomach. Coagulation for bleeding        prevention using argon plasma at 0.3 liters/minute and 20 [place] was        successful.        The examined duodenum was normal.      8/14/22:  No more visible rectal bleeding- no bowel movements today  Receiving another unit PRBC now       Latest Reference Range & Units 08/13/22 22:06 08/14/22 02:20   Hemoglobin 14.0 - 18.0 gm/dL 8.3 (L) 7.7 (L)   Hematocrit 42.0 - 52.0 % 25.8 (L) 23.2 (L)        Review of Systems   Constitutional:  Negative for chills, fever and unexpected weight change.   HENT:  Negative for mouth sores, sore throat and trouble swallowing.    Eyes:  Negative for discharge and visual disturbance.   Respiratory:  Negative for apnea, cough, shortness of breath and wheezing.    Cardiovascular:  Negative for chest pain, palpitations and leg swelling.   Gastrointestinal:  Negative for abdominal distention, abdominal pain, blood in stool, nausea and vomiting.        Denies heartburn, nausea, vomiting, dysphagia, globus sensation, hematemesis, epigastric pain.   Denies Abdominal pain, tenderness  Denies constipation, diarrhea, BRBPR   Skin:  Negative for color change.   Neurological:  Negative for dizziness, syncope, weakness and headaches.   Psychiatric/Behavioral:  Negative for agitation, confusion and hallucinations.    Objective:     Vital Signs (Most Recent):  Temp: 98.4 °F (36.9 °C) (08/14/22 1208)  Pulse: 74 (08/14/22 1208)  Resp: 18 (08/14/22 1208)  BP: (!) 156/92 (08/14/22 1208)  SpO2: 96 % (08/14/22 1208)   Vital Signs (24h Range):  Temp:  [97.7 °F (36.5 °C)-103.1 °F (39.5 °C)] 98.4 °F (36.9 °C)  Pulse:  [74-93]  74  Resp:  [16-20] 18  SpO2:  [90 %-98 %] 96 %  BP: (109-178)/(62-98) 156/92     Weight: 89.9 kg (198 lb 3.1 oz) (08/13/22 0555)  Body mass index is 27.26 kg/m².      Intake/Output Summary (Last 24 hours) at 8/14/2022 1350  Last data filed at 8/14/2022 1130  Gross per 24 hour   Intake 784 ml   Output --   Net 784 ml       Lines/Drains/Airways       Peripheral Intravenous Line  Duration                  Peripheral IV - Single Lumen 18 G Left Antecubital -- days         Peripheral IV - Single Lumen Anterior;Distal;Right Antecubital -- days         Peripheral IV - Single Lumen 08/13/22 1325 18 G Anterior;Proximal;Right Forearm 1 day                    Physical Exam  Constitutional:       General: He is not in acute distress.     Appearance: Normal appearance. He is not ill-appearing.   HENT:      Head: Normocephalic.      Nose: No congestion or rhinorrhea.   Eyes:      Extraocular Movements: Extraocular movements intact.      Conjunctiva/sclera: Conjunctivae normal.      Pupils: Pupils are equal, round, and reactive to light.   Cardiovascular:      Rate and Rhythm: Normal rate and regular rhythm.   Pulmonary:      Effort: Pulmonary effort is normal. No respiratory distress.      Breath sounds: Normal breath sounds. No stridor. No wheezing or rhonchi.   Abdominal:      General: Bowel sounds are normal. There is no distension.      Palpations: Abdomen is soft.      Tenderness: There is no abdominal tenderness. There is no guarding.   Skin:     General: Skin is warm and dry.      Coloration: Skin is not jaundiced.   Neurological:      Mental Status: He is alert and oriented to person, place, and time.   Psychiatric:         Mood and Affect: Mood normal.         Behavior: Behavior normal.       Significant Labs:  Recent Lab Results         08/14/22  0649   08/14/22  0220   08/13/22 2206        Unit Blood Type Code     0600  [P]            0600  [P]            7300  [P]       Unit Expiration     [id number]  [P]             [id number]  [P]            [id number]  [P]       Albumin/Globulin Ratio   0.6         AFP   2.50         Albumin   2.2         Alkaline Phosphatase   40         ALT   32         Aniso   2+         AST   103         Baso #   0.06         Basophil %   2         BILIRUBIN TOTAL   6.0         BUN   22.0         Calcium   7.9         Chloride   109         CO2   23         Creatinine   1.10         CROSSMATCH INTERPRETATION     Compatible  [P]            Compatible  [P]            Not required  [P]       DISPENSE STATUS     Issued  [P]            Selected  [P]            Issued  [P]       eGFR   >60         Eos #   0.03         Eosinophil %   1         Globulin, Total   3.7         Glucose   104         Gran # (ANC)   2.58         Group & Rh     A POS       Hematocrit   23.2   25.8       Hemoglobin   7.7   8.3       HIV   Nonreactive         Hypo   1+         Immature Grans (Abs)   0.02         Immature Granulocytes   0.6         Indirect Benito GEL     NEG       Instr WBC   3         Lactate, Tom 1.8   2.7  Comment: A repeat order for Lactic Acid has been placed for collection.   3.6  Comment: Critical Result called and verified by verbal readback to: Layla Simon on 08/13/2022 at 23:08. Reported by [id number].  A repeat order for Lactic Acid has been placed for collection.       Lymph #   0.27         Lymph Man   9         Macrocyte   2+         MCH   30.1         MCHC   33.2         MCV   90.6         Metamyelocytes   1         Mono #   0.09         Mono %   3         MPV   --  Comment: na         Neutrophils Relative   85         nRBC   0.0         Platelet Estimate   Decreased         Platelets   41         Poikilocytosis   1+         Poly   1+         Potassium   3.2         Product Code     G7520A31  [P]            [id number]  [P]            [id number]  [P]       PROTEIN TOTAL   5.9         RBC   2.56         RBC Morph   Abnormal         RDW   21.2         Sodium   138         Unit ABO/Rh     A NEG  [P]             A NEG  [P]            B POS  [P]       UNIT NUMBER     [id number]  [P]            [id number]  [P]            [id number]  [P]       WBC   3.3                  [P] - Preliminary Result                 Significant Imaging:  No new imaging

## 2022-08-14 NOTE — PROGRESS NOTES
[facility] - 3rd Floor Medical Telemetry  Gastroenterology  Progress Note    Patient Name: Yong Townsend  MRN: [id number]  Admission Date: 8/13/2022  Hospital Length of Stay: 1 days  Code Status: Full Code   Attending Provider: Jim Lee MD  Consulting Provider: Anna Rodriguez III, MD  Primary Care Physician: Primary Doctor No  Principal Problem: Anemia due to gastrointestinal blood loss      Subjective:     Interval History: Consultation 8/13/22: Initial History of Present Illness (HPI):  This is a 57 y.o. male was transferred from [facility] in [place] for GI bleed presenting as bloody BMs.   Patient awake from sleep to find a large amount of dark blood covering the sheet. He could not determine the origin of the blood.   He experienced no GI symptoms prior to the episode except for occasional bright red blood with stool.     He had a prior large volume bleed in 2018 and esophageal varies were banded.     From previous facility:   8/12/22:  H&H 8.9/26.6  Plt 38  Ammonia 59 (H)  Lactic acid 6.2 (H)  Ethanol 245  UDS positive for cocaine  Large amount of blood in urine     CT abd/pelvis reveals cirrhotic liver morphology with secondary findings of portal HTN including splenomegaly, prominent portosystemic collaterals, and diffuse mesenteric stranding. No significant ascites.  Choleithiasis with gallbladder wall thickening. The gallbladder wall thickening could be associated with portal HTN. Colonic diverticulosis.     Followed in our practice by Dr. Mauro Lin and Shana Araya, NP for GERD and cirrhosis. He has fatty liver and hx of ETOH  Abuse.  Treated with PPI, propranolol  Last seen in office 6/6/2022  EGD: 6/7/22: Scar in lower esophagus from prior esophageal banding. Grade II esophageal varices which flattened with insufflation. Portal hypertensive gastropathy.   Varices were banded in 2018.  [facility] [place] [facility]  US Feb 2022: recannulization of  umbilical vein, hepatic steatosis. Cholelithiasis. Splenomegaly  [facility] rehab in 2018 but Still drinks whiskey socially. Uses coke and marijuana.     He plans on returning to [place] to enter rehab.     Also followed by Dr. Sisi Whalen and he had been evaluated by a transplant team.     EGD 8/13/22:  Findings:        Localized post-therapy mucosal sclerosis was found in the lower        third of the esophagus.        A Dieulafoy lesion with oozing bleeding and stigmata of recent        bleeding was found on the lesser curvature of the stomach.        Coagulation for hemostasis using argon plasma at 0.3 liters/minute        and 20 [place] was successful.        A single 5 mm angiodysplastic lesion with no bleeding was found on        the lesser curvature of the stomach. Coagulation for bleeding        prevention using argon plasma at 0.3 liters/minute and 20 [place] was        successful.        The examined duodenum was normal.      8/14/22:  No more visible rectal bleeding- no bowel movements today  Receiving another unit PRBC now       Latest Reference Range & Units 08/13/22 22:06 08/14/22 02:20   Hemoglobin 14.0 - 18.0 gm/dL 8.3 (L) 7.7 (L)   Hematocrit 42.0 - 52.0 % 25.8 (L) 23.2 (L)        Review of Systems   Constitutional:  Negative for chills, fever and unexpected weight change.   HENT:  Negative for mouth sores, sore throat and trouble swallowing.    Eyes:  Negative for discharge and visual disturbance.   Respiratory:  Negative for apnea, cough, shortness of breath and wheezing.    Cardiovascular:  Negative for chest pain, palpitations and leg swelling.   Gastrointestinal:  Negative for abdominal distention, abdominal pain, blood in stool, nausea and vomiting.        Denies heartburn, nausea, vomiting, dysphagia, globus sensation, hematemesis, epigastric pain.   Denies Abdominal pain, tenderness  Denies constipation, diarrhea, BRBPR   Skin:  Negative for color change.   Neurological:  Negative for dizziness,  syncope, weakness and headaches.   Psychiatric/Behavioral:  Negative for agitation, confusion and hallucinations.    Objective:     Vital Signs (Most Recent):  Temp: 98.4 °F (36.9 °C) (08/14/22 1208)  Pulse: 74 (08/14/22 1208)  Resp: 18 (08/14/22 1208)  BP: (!) 156/92 (08/14/22 1208)  SpO2: 96 % (08/14/22 1208)   Vital Signs (24h Range):  Temp:  [97.7 °F (36.5 °C)-103.1 °F (39.5 °C)] 98.4 °F (36.9 °C)  Pulse:  [74-93] 74  Resp:  [16-20] 18  SpO2:  [90 %-98 %] 96 %  BP: (109-178)/(62-98) 156/92     Weight: 89.9 kg (198 lb 3.1 oz) (08/13/22 0555)  Body mass index is 27.26 kg/m².      Intake/Output Summary (Last 24 hours) at 8/14/2022 1350  Last data filed at 8/14/2022 1130  Gross per 24 hour   Intake 784 ml   Output --   Net 784 ml       Lines/Drains/Airways       Peripheral Intravenous Line  Duration                  Peripheral IV - Single Lumen 18 G Left Antecubital -- days         Peripheral IV - Single Lumen Anterior;Distal;Right Antecubital -- days         Peripheral IV - Single Lumen 08/13/22 1325 18 G Anterior;Proximal;Right Forearm 1 day                    Physical Exam  Constitutional:       General: He is not in acute distress.     Appearance: Normal appearance. He is not ill-appearing.   HENT:      Head: Normocephalic.      Nose: No congestion or rhinorrhea.   Eyes:      Extraocular Movements: Extraocular movements intact.      Conjunctiva/sclera: Conjunctivae normal.      Pupils: Pupils are equal, round, and reactive to light.   Cardiovascular:      Rate and Rhythm: Normal rate and regular rhythm.   Pulmonary:      Effort: Pulmonary effort is normal. No respiratory distress.      Breath sounds: Normal breath sounds. No stridor. No wheezing or rhonchi.   Abdominal:      General: Bowel sounds are normal. There is no distension.      Palpations: Abdomen is soft.      Tenderness: There is no abdominal tenderness. There is no guarding.   Skin:     General: Skin is warm and dry.      Coloration: Skin is not  jaundiced.   Neurological:      Mental Status: He is alert and oriented to person, place, and time.   Psychiatric:         Mood and Affect: Mood normal.         Behavior: Behavior normal.       Significant Labs:  Recent Lab Results         08/14/22  0649   08/14/22  0220   08/13/22  2206        Unit Blood Type Code     0600  [P]            0600  [P]            7300  [P]       Unit Expiration     [id number]  [P]            [id number]  [P]            [id number]  [P]       Albumin/Globulin Ratio   0.6         AFP   2.50         Albumin   2.2         Alkaline Phosphatase   40         ALT   32         Aniso   2+         AST   103         Baso #   0.06         Basophil %   2         BILIRUBIN TOTAL   6.0         BUN   22.0         Calcium   7.9         Chloride   109         CO2   23         Creatinine   1.10         CROSSMATCH INTERPRETATION     Compatible  [P]            Compatible  [P]            Not required  [P]       DISPENSE STATUS     Issued  [P]            Selected  [P]            Issued  [P]       eGFR   >60         Eos #   0.03         Eosinophil %   1         Globulin, Total   3.7         Glucose   104         Gran # (ANC)   2.58         Group & Rh     A POS       Hematocrit   23.2   25.8       Hemoglobin   7.7   8.3       HIV   Nonreactive         Hypo   1+         Immature Grans (Abs)   0.02         Immature Granulocytes   0.6         Indirect [place]ito GEL     NEG       Instr WBC   3         Lactate, Tom 1.8   2.7  Comment: A repeat order for Lactic Acid has been placed for collection.   3.6  Comment: Critical Result called and verified by verbal readback to: Layla Simon on 08/13/2022 at 23:08. Reported by [id number].  A repeat order for Lactic Acid has been placed for collection.       Lymph #   0.27         Lymph Man   9         Macrocyte   2+         MCH   30.1         MCHC   33.2         MCV   90.6         Metamyelocytes   1         Mono #   0.09         Mono %   3         MPV   --  Comment: na          Neutrophils Relative   85         nRBC   0.0         Platelet Estimate   Decreased         Platelets   41         Poikilocytosis   1+         Poly   1+         Potassium   3.2         Product Code     [id number]  [P]            [id number]  [P]            [id number]  [P]       PROTEIN TOTAL   5.9         RBC   2.56         RBC Morph   Abnormal         RDW   21.2         Sodium   138         Unit ABO/Rh     A NEG  [P]            A NEG  [P]            B POS  [P]       UNIT NUMBER     [id number]  [P]            [id number]  [P]            [id number]  [P]       WBC   3.3                  [P] - Preliminary Result                 Significant Imaging:  No new imaging    Assessment/Plan:     * Anemia due to gastrointestinal blood loss  Gastric Dieulafoy s/p APC 8/13/22  Additional unit of PRBC today    Alcoholic cirrhosis of liver without ascites  Pending AFP, CMV, EBV, HIV  MELD 22  Child-Harvey Class C  Maddrey's Discriminant Function >32        Marie Galindo, JOCELYNN acting as scribe for Anna Rodriguez III, MD  Gastroenterology  [facility] - 3rd Floor Medical Telemetry    The documentation recorded by the scribe/NP accurately reflects the service I personally performed and the decisions made by me.       Anna Rodriguez Iii, MD  [facility]

## 2022-08-14 NOTE — HPI
Consultation 8/13/22: Initial History of Present Illness (HPI):  This is a 57 y.o. male was [place] for GI bleed presenting as bloody BMs.   Patient awake from sleep to find a large amount of dark blood covering the sheet. He could not determine the origin of the blood.   He experienced no GI symptoms prior to the episode except for occasional bright red blood with stool.     He had a prior large volume bleed in 2018 and esophageal varies were banded.     From previous facility:   8/12/22:  H&H 8.9/26.6  Plt 38  Ammonia 59 (H)  Lactic acid 6.2 (H)  Ethanol 245  UDS positive for cocaine  Large amount of blood in urine     CT abd/pelvis reveals cirrhotic liver morphology with secondary findings of portal HTN including splenomegaly, prominent portosystemic collaterals, and diffuse mesenteric stranding. No significant ascites.  Choleithiasis with gallbladder wall thickening. The gallbladder wall thickening could be associated with portal HTN. Colonic diverticulosis.     Followed in our practice by Dr. Mauro Lin and Shana Araya, NP for GERD and cirrhosis. He has fatty liver and hx of ETOH  Abuse.  Treated with PPI, propranolol  Last seen in office 6/6/2022  EGD: 6/7/22: Scar in lower esophagus from prior esophageal banding. Grade II esophageal varices which flattened with insufflation. Portal hypertensive gastropathy.   Varices were banded in 2018.  [facility] [place] [facility]  US Feb 2022: recannulization of umbilical vein, hepatic steatosis. Cholelithiasis. Splenomegaly  [facility] re[facility] in 2018 but Still drinks whiskey socially. Uses coke and marijuana.     He plans on returning to [place] to enter rehab.     Also followed by Dr. Sisi Whalen and he had been evaluated by a transplant team.    EGD 8/13/22:  Findings:        Localized post-therapy mucosal sclerosis was found in the lower        third of the esophagus.        A Dieulafoy lesion with oozing  bleeding and stigmata of recent        bleeding was found on the lesser curvature of the stomach.        Coagulation for hemostasis using argon plasma at 0.3 liters/minute        and 20 [place] was successful.        A single 5 mm angiodysplastic lesion with no bleeding was found on        the lesser curvature of the stomach. Coagulation for bleeding        prevention using argon plasma at 0.3 liters/minute and 20 [place] was        successful.        The examined duodenum was normal.     8/14/22:  No more visible rectal bleeding     Latest Reference Range & Units 08/13/22 22:06 08/14/22 02:20   Hemoglobin 14.0 - 18.0 gm/dL 8.3 (L) 7.7 (L)   Hematocrit 42.0 - 52.0 % 25.8 (L) 23.2 (L)

## 2022-08-15 LAB
ABS NEUT (OLG): 4.5 X10(3)/MCL (ref 2.1–9.2)
ALBUMIN SERPL-MCNC: 2.3 GM/DL (ref 3.5–5)
ALBUMIN/GLOB SERPL: 0.6 RATIO (ref 1.1–2)
ALP SERPL-CCNC: 52 UNIT/L (ref 40–150)
ALT SERPL-CCNC: 32 UNIT/L (ref 0–55)
ANISOCYTOSIS BLD QL SMEAR: ABNORMAL
AST SERPL-CCNC: 85 UNIT/L (ref 5–34)
BASOPHILS NFR BLD MANUAL: 0.05 X10(3)/MCL (ref 0–0.2)
BASOPHILS NFR BLD MANUAL: 1 %
BILIRUBIN DIRECT+TOT PNL SERPL-MCNC: 11.5 MG/DL
BUN SERPL-MCNC: 9.6 MG/DL (ref 8.4–25.7)
BURR CELLS (OLG): ABNORMAL
CALCIUM SERPL-MCNC: 8.1 MG/DL (ref 8.4–10.2)
CHLORIDE SERPL-SCNC: 102 MMOL/L (ref 98–107)
CO2 SERPL-SCNC: 22 MMOL/L (ref 22–29)
CREAT SERPL-MCNC: 0.68 MG/DL (ref 0.73–1.18)
EOSINOPHIL NFR BLD MANUAL: 0.05 X10(3)/MCL (ref 0–0.9)
EOSINOPHIL NFR BLD MANUAL: 1 %
ERYTHROCYTE [DISTWIDTH] IN BLOOD BY AUTOMATED COUNT: 19 % (ref 11.5–17)
GFR SERPLBLD CREATININE-BSD FMLA CKD-EPI: >60 MLS/MIN/1.73/M2
GLOBULIN SER-MCNC: 4 GM/DL (ref 2.4–3.5)
GLUCOSE SERPL-MCNC: 96 MG/DL (ref 74–100)
HCT VFR BLD AUTO: 30 % (ref 42–52)
HGB BLD-MCNC: 10.7 GM/DL (ref 14–18)
IMM GRANULOCYTES # BLD AUTO: 0.03 X10(3)/MCL (ref 0–0.04)
IMM GRANULOCYTES NFR BLD AUTO: 0.6 %
INSTRUMENT WBC (OLG): 5 X10(3)/MCL
LYMPHOCYTES NFR BLD MANUAL: 0.2 X10(3)/MCL
LYMPHOCYTES NFR BLD MANUAL: 4 %
MACROCYTES BLD QL SMEAR: ABNORMAL
MCH RBC QN AUTO: 30.4 PG (ref 27–31)
MCHC RBC AUTO-ENTMCNC: 35.7 MG/DL (ref 33–36)
MCV RBC AUTO: 85.2 FL (ref 80–94)
MONOCYTES NFR BLD MANUAL: 0.2 X10(3)/MCL (ref 0.1–1.3)
MONOCYTES NFR BLD MANUAL: 4 %
NEUTROPHILS NFR BLD MANUAL: 90 %
NRBC BLD AUTO-RTO: 0 %
NRBC BLD MANUAL-RTO: 1 %
PLATELET # BLD AUTO: 43 X10(3)/MCL (ref 130–400)
PLATELET # BLD EST: ABNORMAL 10*3/UL
PMV BLD AUTO: ABNORMAL FL
POIKILOCYTOSIS BLD QL SMEAR: ABNORMAL
POTASSIUM SERPL-SCNC: 3.1 MMOL/L (ref 3.5–5.1)
PROT SERPL-MCNC: 6.3 GM/DL (ref 6.4–8.3)
RBC # BLD AUTO: 3.52 X10(6)/MCL (ref 4.7–6.1)
RBC MORPH BLD: ABNORMAL
SODIUM SERPL-SCNC: 133 MMOL/L (ref 136–145)
WBC # SPEC AUTO: 5.3 X10(3)/MCL (ref 4.5–11.5)

## 2022-08-15 PROCEDURE — 21400001 HC TELEMETRY ROOM

## 2022-08-15 PROCEDURE — 25000003 PHARM REV CODE 250: Performed by: NURSE PRACTITIONER

## 2022-08-15 PROCEDURE — 80053 COMPREHEN METABOLIC PANEL: CPT | Performed by: STUDENT IN AN ORGANIZED HEALTH CARE EDUCATION/TRAINING PROGRAM

## 2022-08-15 PROCEDURE — C9113 INJ PANTOPRAZOLE SODIUM, VIA: HCPCS | Performed by: NURSE PRACTITIONER

## 2022-08-15 PROCEDURE — 63600175 PHARM REV CODE 636 W HCPCS: Performed by: INTERNAL MEDICINE

## 2022-08-15 PROCEDURE — 36415 COLL VENOUS BLD VENIPUNCTURE: CPT | Performed by: STUDENT IN AN ORGANIZED HEALTH CARE EDUCATION/TRAINING PROGRAM

## 2022-08-15 PROCEDURE — 27000207 HC ISOLATION

## 2022-08-15 PROCEDURE — 97535 SELF CARE MNGMENT TRAINING: CPT

## 2022-08-15 PROCEDURE — 63600175 PHARM REV CODE 636 W HCPCS: Mod: JA | Performed by: NURSE PRACTITIONER

## 2022-08-15 PROCEDURE — 97166 OT EVAL MOD COMPLEX 45 MIN: CPT

## 2022-08-15 PROCEDURE — 85025 COMPLETE CBC W/AUTO DIFF WBC: CPT | Performed by: PHYSICIAN ASSISTANT

## 2022-08-15 PROCEDURE — 36415 COLL VENOUS BLD VENIPUNCTURE: CPT | Performed by: PHYSICIAN ASSISTANT

## 2022-08-15 PROCEDURE — 25000003 PHARM REV CODE 250: Performed by: INTERNAL MEDICINE

## 2022-08-15 RX ORDER — PANTOPRAZOLE SODIUM 40 MG/10ML
40 INJECTION, POWDER, LYOPHILIZED, FOR SOLUTION INTRAVENOUS DAILY
Status: DISCONTINUED | OUTPATIENT
Start: 2022-08-16 | End: 2022-08-16

## 2022-08-15 RX ADMIN — PANTOPRAZOLE SODIUM 8 MG/HR: 40 INJECTION, POWDER, FOR SOLUTION INTRAVENOUS at 03:08

## 2022-08-15 RX ADMIN — FOLIC ACID 1 MG: 1 TABLET ORAL at 09:08

## 2022-08-15 RX ADMIN — OCTREOTIDE ACETATE 50 MCG/HR: 500 INJECTION, SOLUTION INTRAVENOUS; SUBCUTANEOUS at 09:08

## 2022-08-15 RX ADMIN — THERA TABS 1 TABLET: TAB at 09:08

## 2022-08-15 RX ADMIN — PIPERACILLIN AND TAZOBACTAM 4.5 G: 4; .5 INJECTION, POWDER, LYOPHILIZED, FOR SOLUTION INTRAVENOUS; PARENTERAL at 04:08

## 2022-08-15 RX ADMIN — PIPERACILLIN AND TAZOBACTAM 4.5 G: 4; .5 INJECTION, POWDER, LYOPHILIZED, FOR SOLUTION INTRAVENOUS; PARENTERAL at 03:08

## 2022-08-15 RX ADMIN — POTASSIUM BICARBONATE 50 MEQ: 978 TABLET, EFFERVESCENT ORAL at 12:08

## 2022-08-15 RX ADMIN — Medication 650 MG: at 04:08

## 2022-08-15 NOTE — PT/OT/SLP EVAL
"Occupational Therapy   Evaluation    Name: Yong Townsend  MRN: [id number]  Admitting Diagnosis:  Anemia due to gastrointestinal blood loss  Recent Surgery: Procedure(s) (LRB):  EGD (ESOPHAGOGASTRODUODENOSCOPY) (N/A)  EGD,WITH HEMORRHAGE CONTROL 2 Days Post-Op    Recommendations:     Discharge Recommendations:  Home, home with home health (with family assist provided if needed), vs rehab facility 2/2 h/o polysubstance abuse (TBD)  Discharge Equipment Recommendations:  walker, rolling, shower chair vs TTB. (TBD, pending progress.)  Barriers to discharge: Medical dx    Assessment:     Yong Townsend is a 57 y.o. male with a medical diagnosis of Anemia due to gastrointestinal blood loss.  He presents with "tightness" in abdomen. Performance deficits affecting function: weakness, impaired endurance, impaired self care skills, impaired functional mobility, impaired balance, decreased safety awareness.      Rehab Prognosis: Good; patient would benefit from acute skilled OT services to address these deficits and reach maximum level of function.       Plan:     Patient to be seen 5 x/week, 6 x/week to address the above listed problems via self-care/home management, therapeutic activities, therapeutic exercises  · Plan of Care Expires: 08/29/22  · Plan of Care Reviewed with: patient    Subjective     Chief Complaint: "Tightness" in abdomen.  Patient/Family Comments/goals: Return to PLOF.    Occupational Profile:  Living Environment: Lives alone in [address]-story [facility] in [place]. Pt reported that he has ADRIANNA, but unsure of #. Owns tub/shower.  Previous level of function: Independent ADL/IADLs, driving, and self-employed (owns film producing company).  Equipment Used at Home:  none  Assistance upon Discharge: TBD.    Patients cultural, spiritual, Hinduism conflicts given the current situation: no    Objective:     Communicated with: RN prior to session. Patient found HOB elevated with peripheral IV, telemetry, pulse ox " (continuous), oxygen upon OT entry to room.    General Precautions: Standard, fall   Respiratory Status: Nasal cannula, flow 2 L/min   Vitals:  HR: 82-88  RR: 19-21  O2: 91-98%    Occupational Performance:    Bed Mobility:    · Patient completed Supine to Sit with stand by assistance  · Patient completed Sit to Supine with stand by assistance    Functional Mobility/Transfers:  · Patient completed Sit <> Stand Transfer with stand by assistance  with  no assistive device   · Patient transferred EOB > chair t/f via step technique with SBA and no AD.  · Functional Mobility: Pt ambulated <> bathroom with CGA provided and no AD.    Activities of Daily Living:  · Lower Body Dressing: stand by assistance provided to don B socks while seated EOB.  · Toileting: stand by assistance to perform personal hygiene after BM. Pt was not wearing LB clothing item during eval.    Cognitive/Visual Perceptual:  Cognitive/Psychosocial Skills:     -       Oriented to: Person, Place, Time and Situation   -       Follows Commands/attention:Follows one-step commands and Follows two-step commands    Physical Exam:  Upper Extremity Range of Motion:     -       Right Upper Extremity: WFL  -       Left Upper Extremity: WFL  Upper Extremity Strength:    -       Right Upper Extremity: WFL  -       Left Upper Extremity: WFL    Patient left HOB elevated with all lines intact and call button in reach    GOALS:   Multidisciplinary Problems     Occupational Therapy Goals        Problem: Occupational Therapy    Goal Priority Disciplines Outcome Interventions   Occupational Therapy Goal     OT, PT/OT Ongoing, Progressing    Description: Goals to be met by: 8/29/22    Patient will increase functional independence with ADLs by performing:    LE Dressing with Modified [place].  Toileting from toilet with Modified [place] for hygiene and clothing management.   Bathing from shower chair/bench with Modified [place].                     History:      Past Medical History:   Diagnosis Date    Alcoholic cirrhosis     Esophageal varices        Past Surgical History:   Procedure Laterality Date    EGD, WITH HEMORRHAGE CONTROL  8/13/2022    Procedure: EGD,WITH HEMORRHAGE CONTROL;  Surgeon: Anna Rodriguez III, MD;  Location: [facility] OR;  Service: Gastroenterology;;    ESOPHAGOGASTRODUODENOSCOPY N/A 8/13/2022    Procedure: EGD (ESOPHAGOGASTRODUODENOSCOPY);  Surgeon: Anna Rodriguez III, MD;  Location: [facility];  Service: Gastroenterology;  Laterality: N/A;    KNEE SURGERY Left        Time Tracking:     OT Date of Treatment: 8/15/22  OT Start Time: 1051  OT Stop Time: 1135  OT Total Time (min): 44 min    Billable Minutes:Evaluation 30 mins  Self Care/Home Management 14 mins    8/15/2022

## 2022-08-15 NOTE — PT/OT/SLP PROGRESS
"Physical Therapy      Patient Name:  Yong Townsend   MRN:  [id number]    Patient not seen today for PT eval. Pt stated "I'm tired and I want to rest. I just don't want to get up right now." Will follow-up tomorrow.        "

## 2022-08-15 NOTE — PLAN OF CARE
Problem: Occupational Therapy  Goal: Occupational Therapy Goal  Description: Goals to be met by: 8/29/22    Patient will increase functional independence with ADLs by performing:    LE Dressing with Modified [place].  Toileting from toilet with Modified [place] for hygiene and clothing management.   Bathing from shower chair/bench with Modified [place].    Outcome: Ongoing, Progressing

## 2022-08-15 NOTE — PROGRESS NOTES
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    Patient was transferred from [facility] in [place]  Patient with significant history of  alcoholic binge drinking occasional weed/cocaine abuse.    During the hospital course patient spike fevers, blood cultures obtained which were no growth to date at  24 hours, EGD was performed by GI on 08/13 which revealed a Dieulafoy's lesion with oozing blood, coagulation for hemostasis using argon plasma.  Patient also found to have nonbleeding angiodysplastic lesion in the lesser curvature of the stomach which was coagulated using argon plasma to  prevent bleeding.   patient currently on Protonix, octreotide drip for GI bleed, GI following.    Advance diet, patient doing about the same    Interval Hx: Current hosp Day 3 NAEON, pt spiked a fever of 101 this a.m at 3.No fevers since, patient denies any chest pain, abdominal pain, pain with urination. BCX NGTD at 24 hrs No signs of infection. fever likely secondary to blood transfusion no complications otherwise, continue supportive care for now.    Overnight patient's blood pressure is elevated with systolic greater than 140,.  IVF, continue p.o. diet.  Patient tolerating p.o. diet and this morning well.     patient sitting comfortably this morning, no more GI  Bleed since hospital admission, no blood transfusions for this a.m.      Objective/physical exam:  General: In no acute distress, afebrile this a.m  Chest: Clear to auscultation bilaterally  Heart: RRR, +S1, S2, no appreciable murmur  Abdomen: Soft, nontender, BS +  MSK: Warm, no lower extremity edema, no clubbing or  cyanosis  Neurologic: Alert and oriented x4, Cranial nerve II-XII intact, Strength 5/5 in all 4 extremities    VITAL SIGNS: 24 HRS MIN & MAX LAST   Temp  Min: 98.2 °F (36.8 °C)  Max: 101.2 °F (38.4 °C) 99.4 °F (37.4 °C)   BP  Min: 149/85  Max: 178/89 (!) 149/85   Pulse  Min: 69  Max: 98  83   Resp  Min: 16  Max: 20 18   SpO2  Min: 92 %  Max: 97 % 95 %       Recent Labs   Lab 08/13/22  0650 08/13/22 2206 08/14/22 0220   WBC 5.9  --  3.3*   RBC 2.62*  --  2.56*   HGB 7.9* 8.3* 7.7*   HCT 24.2* 25.8* 23.2*   MCV 92.4  --  90.6   MCH 30.2  --  30.1   MCHC 32.6*  --  33.2   RDW 21.2*  --  21.2*   PLT 42*  --  41*       Recent Labs   Lab 08/13/22  0651 08/14/22 0220    138   K 3.7 3.2*   CO2 19* 23   BUN 12.6 22.0   CREATININE 0.87 1.10   CALCIUM 8.1* 7.9*   MG 1.00*  --    ALBUMIN 2.3* 2.2*   ALKPHOS 54 40   ALT 29 32   AST 73* 103*   BILITOT 6.5* 6.0*          Microbiology Results (last 7 days)       Procedure Component Value Units Date/Time    Blood Culture #1 **CANNOT BE ORDERED STAT** [[id number]]  (Normal) Collected: 08/13/22 1026    Order Status: Completed Specimen: Blood, Venous Updated: 08/14/22 1202     CULTURE, BLOOD ([facility]) No Growth At 24 Hours    Blood Culture [[id number]]  (Normal) Collected: 08/13/22 1026    Order Status: Completed Specimen: Blood, Venous Updated: 08/14/22 1202     CULTURE, BLOOD ([facility]) No Growth At 24 Hours             See below for Radiology    Scheduled Med:   folic acid  1 mg Oral Daily    multivitamin  1 tablet Oral Daily    piperacillin-tazobactam (ZOSYN) IVPB  4.5 g Intravenous Q8H        Continuous Infusions:   lactated ringers 100 mL/hr at 08/14/22 0003    octreotide (SANDOSTATIN) infusion 50 mcg/hr (08/15/22 0939)    pantoprazole (PROTONIX) IV infusion 8 mg/hr (08/15/22 0354)        PRN Meds:  sodium chloride, sodium chloride, acetaminophen, LORazepam, naloxone, sodium chloride 0.9%       Assessment/Plan:  Symptomatic anemia secondary to GI bleed.    History of prior  esophageal varices, requiring esophageal banding in 2017  Symptomatic anemia secondary to GI bleeding, had EGD performed on 08/13 which showed  Bleeding Dieulafoy lesion which was coagulated with argon plasma.   Patient on IV Protonix, octreotide drip since, GI following.    Patient hemodynamically stable currently, no evidence of any more bleeding.    Advanced diet this a.m.  Waiting on a.m labs.    History of alcoholic cirrhosis.    - Etiology:ETOH  - MELD Na 22, 7-10% 3 month mortality  - No signs of active GI bleed for now  - No signs of hepatic encephalopathy  - No signs of SBP  - Daily weights, Is/Os  - Fluid and salt restriction  - Counseled on Alcohol cessation     history of alcoholic use.    History of polysubstance abuse.     No signs of  Alcohol with drawls for now, will cont to monitor.  Counseled on Alcohol/ substance abuse cessation    VTE prophylaxis: SCD's given recent GI bleed, will check with GI and plans to start on chemical DVT prophy from tomorrow a.m.    Patient condition:  Stable.    Anticipated discharge and Disposition:   Plans to discharge tomorrow if cleared by GI, discharging to home      All diagnosis and differential diagnosis have been reviewed; assessment and plan has been documented; I have personally reviewed the labs and test results that are presently available; I have reviewed the patients medication list; I have reviewed the consulting providers response and recommendations. I have reviewed or attempted to review medical records based upon their availability    All of the patient's questions have been  addressed and answered. Patient's is agreeable to the above stated plan. I will continue to monitor closely and make adjustments to medical management as needed.  _____________________________________________________________________    Nutrition Status:    Radiology:  CT Abdomen Pelvis W Wo Contrast  Narrative: EXAMINATION:  CT ABDOMEN PELVIS W WO CONTRAST    CLINICAL  HISTORY:  Abdominal pain, acute, nonlocalized;gi bleed;    TECHNIQUE:  Multidetector axial images were obtained of the abdomen and pelvis before and following the administration of IV contrast. Oral contrast was not  administered.    Dose length product of 2138  mGycm. Automated exposure control was utilized to minimize radiation dose.    COMPARISON:  August 3, 2022    FINDINGS:  Bilateral dependent trace of pleural effusions and lower lung zones atelectatic changes are new.    Hepatic surface nodularity and volume loss is consistent with cirrhotic morphology.  No focal hepatic space-occupying lesion.  There is splenomegaly, abdomen varices recanalized umbilical vein.  Gallbladder several calculi.  Gallbladder wall is not thickened and there is no pericholecystic fluid.   No biliary dilation identified. Pancreatic unremarkable attenuation without acute peripancreatic phlegmons. Main pancreatic duct is not dilated.  Spleen is enlarged in size with maximum diameter of 14.5 cm.    The adrenal glands appear within normal limits. The kidneys are unremarkable in size and contour. No solid or cystic renal lesion identified. There is no hydronephrosis.  Bilateral perinephric strandings and fluid is new.    The stomach is is not dilated.  Small bowel is normal in caliber. The appendix appears unremarkable. There is noninflamed diverticulosis coli.  Pericolonic fat is preserved. There is no evidence for bowel obstruction. There is no free air or free fluid.    Urinary bladder appears within normal limits. There is no pelvic free fluid.    Marked degenerative changes at L4-L5.  No acute or otherwise osseous abnormality identified.  Impression: 1. Bilateral new trace of pleural effusions.    2. Hepatic cirrhotic morphology, varices and splenomegaly.  No ascites.    3. Several gallstones.    4.  Bilateral perinephric new inflammatory standings and fluid may be related to pyelonephritis.  Please correlate clinically.    5.  Non  inflamed diverticulosis coli.    Electronically signed by: Sudhakar George  Date:    08/13/2022  Time:    19:36      Liliana Moore MD  [employer] IM Resident PGY-3      I Dr OVI Lee assumed care of this patient today at 1 pm    For this patient encounter I performed the substantive portion of the visit. I reviewed the Residents documentation, treatment plan and medical decision making; and I had face-face time with this patient   A. History  No acute issues overnight  No BM or LGI bleeding   Feels much better     B.Physical exam   C. Medical decision making    Agree with the residents documentation and tx plan  Cont supportive care   Possible dc in am if stable

## 2022-08-16 ENCOUNTER — TELEPHONE (OUTPATIENT)
Dept: HEPATOLOGY | Facility: CLINIC | Age: 57
End: 2022-08-16
Payer: OTHER GOVERNMENT

## 2022-08-16 VITALS
BODY MASS INDEX: 26.84 KG/M2 | HEART RATE: 86 BPM | TEMPERATURE: 98 F | WEIGHT: 198.19 LBS | HEIGHT: 72 IN | SYSTOLIC BLOOD PRESSURE: 130 MMHG | RESPIRATION RATE: 18 BRPM | OXYGEN SATURATION: 99 % | DIASTOLIC BLOOD PRESSURE: 74 MMHG

## 2022-08-16 PROBLEM — K92.2 GI BLEED: Status: ACTIVE | Noted: 2022-08-16

## 2022-08-16 LAB
ABS NEUT (OLG): 4.2 X10(3)/MCL (ref 2.1–9.2)
ALBUMIN SERPL-MCNC: 2.1 GM/DL (ref 3.5–5)
ALBUMIN/GLOB SERPL: 0.6 RATIO (ref 1.1–2)
ALP SERPL-CCNC: 55 UNIT/L (ref 40–150)
ALT SERPL-CCNC: 27 UNIT/L (ref 0–55)
AMMONIA PLAS-MSCNC: 69.3 UMOL/L (ref 18–72)
ANISOCYTOSIS BLD QL SMEAR: ABNORMAL
AST SERPL-CCNC: 69 UNIT/L (ref 5–34)
BASOPHILS NFR BLD MANUAL: 0.11 X10(3)/MCL (ref 0–0.2)
BASOPHILS NFR BLD MANUAL: 2 %
BILIRUBIN DIRECT+TOT PNL SERPL-MCNC: 11.1 MG/DL
BUN SERPL-MCNC: 10.5 MG/DL (ref 8.4–25.7)
BURR CELLS (OLG): ABNORMAL
CALCIUM SERPL-MCNC: 7.8 MG/DL (ref 8.4–10.2)
CHLORIDE SERPL-SCNC: 98 MMOL/L (ref 98–107)
CMV IGG SERPL QL IA: NEGATIVE
CMV IGM SERPL QL IA: NEGATIVE
CO2 SERPL-SCNC: 23 MMOL/L (ref 22–29)
CREAT SERPL-MCNC: 0.74 MG/DL (ref 0.73–1.18)
EBV NA AB SER QL: POSITIVE
EBV VCA IGG SER QL: POSITIVE
EBV VCA IGM SER QL: NEGATIVE
ERYTHROCYTE [DISTWIDTH] IN BLOOD BY AUTOMATED COUNT: 19 % (ref 11.5–17)
GFR SERPLBLD CREATININE-BSD FMLA CKD-EPI: >60 MLS/MIN/1.73/M2
GLOBULIN SER-MCNC: 3.7 GM/DL (ref 2.4–3.5)
GLUCOSE SERPL-MCNC: 103 MG/DL (ref 74–100)
HCT VFR BLD AUTO: 27.7 % (ref 42–52)
HGB BLD-MCNC: 9.9 GM/DL (ref 14–18)
IMM GRANULOCYTES # BLD AUTO: 0.05 X10(3)/MCL (ref 0–0.04)
IMM GRANULOCYTES NFR BLD AUTO: 0.9 %
IMMUNOLOGIST REVIEW: NORMAL
INSTRUMENT WBC (OLG): 5.6 X10(3)/MCL
LYMPHOCYTES NFR BLD MANUAL: 0.56 X10(3)/MCL
LYMPHOCYTES NFR BLD MANUAL: 10 %
MACROCYTES BLD QL SMEAR: ABNORMAL
MAGNESIUM SERPL-MCNC: 1.2 MG/DL (ref 1.6–2.6)
MCH RBC QN AUTO: 30.4 PG (ref 27–31)
MCHC RBC AUTO-ENTMCNC: 35.7 MG/DL (ref 33–36)
MCV RBC AUTO: 85 FL (ref 80–94)
MONOCYTES NFR BLD MANUAL: 0.73 X10(3)/MCL (ref 0.1–1.3)
MONOCYTES NFR BLD MANUAL: 13 %
NEUTROPHILS NFR BLD MANUAL: 75 %
NRBC BLD AUTO-RTO: 0.5 %
OVALOCYTES (OLG): ABNORMAL
PHOSPHATE SERPL-MCNC: 2.3 MG/DL (ref 2.3–4.7)
PLATELET # BLD AUTO: 44 X10(3)/MCL (ref 130–400)
PLATELET # BLD EST: ABNORMAL 10*3/UL
PMV BLD AUTO: ABNORMAL FL
POIKILOCYTOSIS BLD QL SMEAR: ABNORMAL
POTASSIUM SERPL-SCNC: 2.6 MMOL/L (ref 3.5–5.1)
PROT SERPL-MCNC: 5.8 GM/DL (ref 6.4–8.3)
RBC # BLD AUTO: 3.26 X10(6)/MCL (ref 4.7–6.1)
RBC MORPH BLD: ABNORMAL
SODIUM SERPL-SCNC: 130 MMOL/L (ref 136–145)
WBC # SPEC AUTO: 5.6 X10(3)/MCL (ref 4.5–11.5)

## 2022-08-16 PROCEDURE — 25000003 PHARM REV CODE 250: Performed by: INTERNAL MEDICINE

## 2022-08-16 PROCEDURE — 25000003 PHARM REV CODE 250: Performed by: NURSE PRACTITIONER

## 2022-08-16 PROCEDURE — 85025 COMPLETE CBC W/AUTO DIFF WBC: CPT | Performed by: STUDENT IN AN ORGANIZED HEALTH CARE EDUCATION/TRAINING PROGRAM

## 2022-08-16 PROCEDURE — 84100 ASSAY OF PHOSPHORUS: CPT | Performed by: INTERNAL MEDICINE

## 2022-08-16 PROCEDURE — 25000003 PHARM REV CODE 250: Performed by: STUDENT IN AN ORGANIZED HEALTH CARE EDUCATION/TRAINING PROGRAM

## 2022-08-16 PROCEDURE — 63600175 PHARM REV CODE 636 W HCPCS: Performed by: INTERNAL MEDICINE

## 2022-08-16 PROCEDURE — 36415 COLL VENOUS BLD VENIPUNCTURE: CPT | Performed by: STUDENT IN AN ORGANIZED HEALTH CARE EDUCATION/TRAINING PROGRAM

## 2022-08-16 PROCEDURE — 83735 ASSAY OF MAGNESIUM: CPT | Performed by: INTERNAL MEDICINE

## 2022-08-16 PROCEDURE — 80053 COMPREHEN METABOLIC PANEL: CPT | Performed by: STUDENT IN AN ORGANIZED HEALTH CARE EDUCATION/TRAINING PROGRAM

## 2022-08-16 PROCEDURE — 36415 COLL VENOUS BLD VENIPUNCTURE: CPT | Performed by: INTERNAL MEDICINE

## 2022-08-16 PROCEDURE — 82140 ASSAY OF AMMONIA: CPT | Performed by: INTERNAL MEDICINE

## 2022-08-16 PROCEDURE — 63600175 PHARM REV CODE 636 W HCPCS: Performed by: STUDENT IN AN ORGANIZED HEALTH CARE EDUCATION/TRAINING PROGRAM

## 2022-08-16 RX ORDER — PANTOPRAZOLE SODIUM 40 MG/1
40 TABLET, DELAYED RELEASE ORAL 2 TIMES DAILY
Qty: 180 TABLET | Refills: 0 | Status: SHIPPED | OUTPATIENT
Start: 2022-08-16 | End: 2022-11-14

## 2022-08-16 RX ORDER — POTASSIUM CHLORIDE 14.9 MG/ML
20 INJECTION INTRAVENOUS
Status: COMPLETED | OUTPATIENT
Start: 2022-08-16 | End: 2022-08-16

## 2022-08-16 RX ORDER — PANTOPRAZOLE SODIUM 40 MG/1
40 TABLET, DELAYED RELEASE ORAL
Status: DISCONTINUED | OUTPATIENT
Start: 2022-08-16 | End: 2022-08-16 | Stop reason: HOSPADM

## 2022-08-16 RX ORDER — FOLIC ACID 1 MG/1
1 TABLET ORAL DAILY
Qty: 90 TABLET | Refills: 3 | Status: SHIPPED | OUTPATIENT
Start: 2022-08-17 | End: 2023-08-17

## 2022-08-16 RX ORDER — MAGNESIUM SULFATE HEPTAHYDRATE 40 MG/ML
2 INJECTION, SOLUTION INTRAVENOUS ONCE
Status: COMPLETED | OUTPATIENT
Start: 2022-08-16 | End: 2022-08-16

## 2022-08-16 RX ORDER — PANTOPRAZOLE SODIUM 40 MG/1
40 TABLET, DELAYED RELEASE ORAL
Status: DISCONTINUED | OUTPATIENT
Start: 2022-08-16 | End: 2022-08-16

## 2022-08-16 RX ORDER — POTASSIUM CHLORIDE 20 MEQ/1
40 TABLET, EXTENDED RELEASE ORAL
Status: COMPLETED | OUTPATIENT
Start: 2022-08-16 | End: 2022-08-16

## 2022-08-16 RX ADMIN — THERA TABS 1 TABLET: TAB at 08:08

## 2022-08-16 RX ADMIN — MAGNESIUM SULFATE HEPTAHYDRATE 2 G: 40 INJECTION, SOLUTION INTRAVENOUS at 10:08

## 2022-08-16 RX ADMIN — POTASSIUM CHLORIDE 40 MEQ: 1500 TABLET, EXTENDED RELEASE ORAL at 10:08

## 2022-08-16 RX ADMIN — POTASSIUM CHLORIDE 20 MEQ: 14.9 INJECTION, SOLUTION INTRAVENOUS at 10:08

## 2022-08-16 RX ADMIN — POTASSIUM CHLORIDE 40 MEQ: 1500 TABLET, EXTENDED RELEASE ORAL at 12:08

## 2022-08-16 RX ADMIN — POTASSIUM CHLORIDE 20 MEQ: 14.9 INJECTION, SOLUTION INTRAVENOUS at 12:08

## 2022-08-16 RX ADMIN — PANTOPRAZOLE SODIUM 40 MG: 40 TABLET, DELAYED RELEASE ORAL at 08:08

## 2022-08-16 RX ADMIN — PIPERACILLIN AND TAZOBACTAM 4.5 G: 4; .5 INJECTION, POWDER, LYOPHILIZED, FOR SOLUTION INTRAVENOUS; PARENTERAL at 12:08

## 2022-08-16 RX ADMIN — FOLIC ACID 1 MG: 1 TABLET ORAL at 08:08

## 2022-08-16 NOTE — DISCHARGE SUMMARY
[facility] Hospital Medicine Discharge Summary    Admit Date: 8/13/2022  Discharge Date and Time: 8/16/20229:07 AM  Admitting Physician:   Discharging Physician:   Primary Care Physician: Primary Doctor No  Consults: GI    Discharge Diagnoses:  Anemia secondary to GI blood loss.  Alcoholic cirrhosis with history of esophageal varices secondary to portal hypertension.  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    Patient was transferred from [facility] in [place]  Patient with significant history of  alcoholic binge drinking occasional weed/cocaine abuse.    During the hospital course patient  had EGD ,  performed by GI on 08/13 which revealed a Dieulafoy's lesion with oozing blood, coagulated for hemostasis using argon plasma.  Patient also found to have nonbleeding angiodysplastic lesion in the lesser curvature of the stomach which was coagulated using argon plasma to  prevent bleeding.  The patient was put on Protonix, octreotide drip for 48 hours for GI bleed, given no evidence of further bleeding liver discontinued and switched to Protonix 40 mg p.o. b.i.d..  Patient clinically and hemodynamically stable at this time.  Also during the hospital course patient spiked fevers, obtain blood cultures with malrotated 48 hours, fever edema secondary to blood transfusions.  No source of infection identified otherwise.  Patient improved during hospital course and was clinically and hemodynamically hematoma discharged home, anemia improved, no more GI losses of blood    Pt was seen and examined on the day of discharge.  Reviewed a.m. labs, replacing potassium with potassium chloride 40 mEq p.o. x2.  Patient  clinically and hemodynamically stable, hence decision was made to be discharged home.  Case management to give information about alcohol rehab centers for the patient pursue outpatient .  Pt in agreement with current plan.    Vitals:  VITAL SIGNS: 24 HRS MIN & MAX LAST   Temp  Min: 97.2 °F (36.2 °C)  Max: 99.8 °F (37.7 °C) 99.8 °F (37.7 °C)   BP  Min: 136/75  Max: 160/87 136/75   Pulse  Min: 76  Max: 81  80   Resp  Min: 18  Max: 20 20   SpO2  Min: 93 %  Max: 99 % 96 %       Physical Exam:  Physical Exam     General: NAD   HEENT: Atraumatic, Normocephalic. No scleral icterus.   Neck: supple. No JVD   Pulm: CTAB. No wheezes. No crackles. Symmetrical chest expansion.  Cardio: Regular rate. Regular rhythm. No murmurs/gallops.   Abd: +BS, soft, non-distended, non-tender to palpation.   Extremities: good 2+ pulses in all extremities. No LE edema.   MSK: No obvious deformities. Moves all extremities purposely.   Neuro: Alert, responsive. Oriented x 3. Responds to questions appropriately. Follows simple commands.       Procedures Performed:    EGD 8/13/22:  Findings:        Localized post-therapy mucosal sclerosis was found in the lower        third of the esophagus.        A Dieulafoy lesion with oozing bleeding and stigmata of recent        bleeding was found on the lesser curvature of the stomach.        Coagulation for hemostasis using argon plasma at 0.3 liters/minute        and 20 [place] was successful.        A single 5 mm angiodysplastic lesion with no bleeding was found on        the lesser curvature of the stomach. Coagulation for bleeding        prevention using argon plasma at 0.3 liters/minute and 20 [place] was        successful.        The examined duodenum was normal.     Significant Diagnostic Studies: See Full reports for all details    Recent Labs   Lab 08/14/22  0220 08/15/22  1402 08/16/22  0436   WBC 3.3* 5.3 5.6   RBC 2.56* 3.52* 3.26*   HGB 7.7* 10.7* 9.9*   HCT 23.2* 30.0* 27.7*   MCV 90.6 85.2 85.0    MCH 30.1 30.4 30.4   MCHC 33.2 35.7 35.7   RDW 21.2* 19.0* 19.0*   PLT 41* 43* 44*       Recent Labs   Lab 08/13/22  0651 08/14/22  0220 08/15/22  1451 08/16/22  0436    138 133* 130*   K 3.7 3.2* 3.1* 2.6*   CO2 19* 23 22 23   BUN 12.6 22.0 9.6 10.5   CREATININE 0.87 1.10 0.68* 0.74   CALCIUM 8.1* 7.9* 8.1* 7.8*   MG 1.00*  --   --   --    ALBUMIN 2.3* 2.2* 2.3* 2.1*   ALKPHOS 54 40 52 55   ALT 29 32 32 27   AST 73* 103* 85* 69*   BILITOT 6.5* 6.0* 11.5* 11.1*        Microbiology Results (last 7 days)       Procedure Component Value Units Date/Time    Blood Culture [[id number]]  (Normal) Collected: 08/13/22 1026    Order Status: Completed Specimen: Blood, Venous Updated: 08/15/22 1202     CULTURE, BLOOD ([facility]) No Growth At 48 Hours    Blood Culture #1 **CANNOT BE ORDERED STAT** [[id number]]  (Normal) Collected: 08/13/22 1026    Order Status: Completed Specimen: Blood, Venous Updated: 08/15/22 1201     CULTURE, BLOOD ([facility]) No Growth At 48 Hours             CT Abdomen Pelvis W Wo Contrast  Narrative: EXAMINATION:  CT ABDOMEN PELVIS W WO CONTRAST    CLINICAL HISTORY:  Abdominal pain, acute, nonlocalized;gi bleed;    TECHNIQUE:  Multidetector axial images were obtained of the abdomen and pelvis before and following the administration of IV contrast. Oral contrast was not  administered.    Dose length product of 2138  mGycm. Automated exposure control was utilized to minimize radiation dose.    COMPARISON:  August 3, 2022    FINDINGS:  Bilateral dependent trace of pleural effusions and lower lung zones atelectatic changes are new.    Hepatic surface nodularity and volume loss is consistent with cirrhotic morphology.  No focal hepatic space-occupying lesion.  There is splenomegaly, abdomen varices recanalized umbilical vein.  Gallbladder several calculi.  Gallbladder wall is not thickened and there is no pericholecystic fluid.   No biliary dilation identified. Pancreatic unremarkable attenuation without acute  peripancreatic phlegmons. Main pancreatic duct is not dilated.  Spleen is enlarged in size with maximum diameter of 14.5 cm.    The adrenal glands appear within normal limits. The kidneys are unremarkable in size and contour. No solid or cystic renal lesion identified. There is no hydronephrosis.  Bilateral perinephric strandings and fluid is new.    The stomach is is not dilated.  Small bowel is normal in caliber. The appendix appears unremarkable. There is noninflamed diverticulosis coli.  Pericolonic fat is preserved. There is no evidence for bowel obstruction. There is no free air or free fluid.    Urinary bladder appears within normal limits. There is no pelvic free fluid.    Marked degenerative changes at L4-L5.  No acute or otherwise osseous abnormality identified.  Impression: 1. Bilateral new trace of pleural effusions.    2. Hepatic cirrhotic morphology, varices and splenomegaly.  No ascites.    3. Several gallstones.    4.  Bilateral perinephric new inflammatory standings and fluid may be related to pyelonephritis.  Please correlate clinically.    5.  Non inflamed diverticulosis coli.    Electronically signed by: Sudhakar George  Date:    08/13/2022  Time:    19:36         Medication List        CHANGE how you take these medications      * folic acid 1 MG tablet  Commonly known as: FOLVITE  Take 1 tablet (1,000 mcg total) by mouth every morning.  What changed: Another medication with the same name was added. Make sure you understand how and when to take each.     * folic acid 1 MG tablet  Commonly known as: FOLVITE  Take 1 tablet (1 mg total) by mouth once daily.  Start taking on: August 17, 2022  What changed: You were already taking a medication with the same name, and this prescription was added. Make sure you understand how and when to take each.     pantoprazole 40 MG tablet  Commonly known as: PROTONIX  Take 1 tablet (40 mg total) by mouth 2 (two) times daily.  What changed: when to take this            * This list has 2 medication(s) that are the same as other medications prescribed for you. Read the directions carefully, and ask your doctor or other care provider to review them with you.                CONTINUE taking these medications      ergocalciferol 50,000 unit Cap  Commonly known as: ERGOCALCIFEROL     magnesium oxide 400 mg (241.3 mg magnesium) tablet  Commonly known as: MAG-OX     potassium chloride 20 mEq  Commonly known as: K-TAB     propranoloL 20 MG tablet  Commonly known as: INDERAL     thiamine 50 MG tablet  Commonly known as: VITAMIN B-1     traMADoL 100 MG Tb24  Commonly known as: ULTRAM-ER               Where to Get Your Medications        These medications were sent to [facility]/pharmacy #[id number] - [place], [place] - [address]  [address] [place] [address] [address]      Phone: [phone]   folic acid 1 MG tablet  pantoprazole 40 MG tablet          Explained in detail to the patient about the discharge plan, medications, and follow-up visits. Pt understands and agrees with the treatment plan  Discharge Disposition:  patient is clinically and hemodynamically stable to be discharged home, patient given information to pursue alcohol rehab outpatient.  Discharged Condition: stable  Diet- Low salt diet.     Medications Per DC med rec  Activities as tolerated   Follow-up Information       Primary Doctor No Follow up in 1 month(s).                           For further questions contact hospitalist office    Discharge time 33 minutes    For worsening symptoms, chest pain, shortness of breath, increased abdominal pain, high grade fever, stroke or stroke like symptoms, immediately go to the nearest Emergency Room or call 911 as soon as possible.      Liliana Moore MD  [employer] IM Resident PGY-3      I Dr OVI Lee assumed care of this patient on 8/16/22    For this patient encounter I performed the substantive portion of the visit. I reviewed the Residents documentation, treatment plan and medical decision  making; and I had face-face time with this patient   A. History  B.Physical exam   C. Medical decision making    Patient doing well. No GI bleeding seen. H&H stable   Agree with the residents documentation and discharge plan

## 2022-08-16 NOTE — PT/OT/SLP PROGRESS
"Occupational Therapy   Treatment    Name: Yong Townsend  MRN: [id number]    OT attempted to see pt for tx session. However, pt refused to participate in therapy despite education and encouragement provided. Pt repeatedly stated, "I just don't feel like it." OT will f/u tomorrow.  "

## 2022-08-16 NOTE — PLAN OF CARE
Initial DC Assessment done with pt. Pt states he lives b/w [facility] and [place] (he states he is in the [employer]). Currently he is staying in [place] with his parents. He states he indep in ADL's prior to admit. Pt states he drives. He denies any need of DME and he states he would like a PCP in [facility].  I provided him with handouts of local and state ETOH/Drug rehab facilities inpt and outpt. He stated he would not go to any rehab in [place] he would go when he is in [place] near a beach.  Pt's friend present in room whom he states will drive him home. Pt denies any dc needs at this time.

## 2022-08-16 NOTE — TELEPHONE ENCOUNTER
Attempted to call   again on available phone numbers to inform that he was declined for liver transplant, unfortunately  no response, could not leave a message.  
no

## 2022-08-18 ENCOUNTER — PATIENT OUTREACH (OUTPATIENT)
Dept: ADMINISTRATIVE | Facility: CLINIC | Age: 57
End: 2022-08-18
Payer: OTHER GOVERNMENT

## 2022-08-18 LAB
BACTERIA BLD CULT: NORMAL
BACTERIA BLD CULT: NORMAL

## 2022-08-18 NOTE — PROGRESS NOTES
C3 nurse spoke with Yong Townsedn for a TCC post hospital discharge follow up call, call completed. The patient does not have a scheduled HOSFU yet, pt does not has PCP, pt wants to call for appt, access to care number provided to pt.

## 2022-11-14 PROBLEM — D50.0 ANEMIA DUE TO GASTROINTESTINAL BLOOD LOSS: Status: RESOLVED | Noted: 2022-08-13 | Resolved: 2022-11-14

## 2022-11-21 PROBLEM — K92.2 GI BLEED: Status: RESOLVED | Noted: 2022-08-16 | Resolved: 2022-11-21

## 2023-02-08 ENCOUNTER — HOSPITAL ENCOUNTER (OUTPATIENT)
Dept: RADIOLOGY | Facility: HOSPITAL | Age: 58
Discharge: HOME | End: 2023-02-08
Attending: NURSE PRACTITIONER
Payer: MEDICAID

## 2023-02-08 DIAGNOSIS — K76.0 STEATOSIS OF LIVER: ICD-10-CM

## 2023-02-08 PROCEDURE — 76700 US EXAM ABDOM COMPLETE: CPT | Mod: TC

## 2024-01-12 NOTE — TELEPHONE ENCOUNTER
Patient had left message that he wanted to move the last few evaluation appointments.  Called patient and strongly encouraged him to try to come to appointments tomorrow.    Hx of severe NV of pregnancy with P2 that improved in second trimester  Multiple ED visits this pregnancy  Concurrent GERD  Transaminitis without electrolyte perturbations  Gastroenterology: Ruth Burton NP ([facility])  Baseline wt: 231 lb  Last wt (1/9/24): 222 lb  Current regimen:   - Ondansetron 4 mg every 6 hours  - Promethazine 25 mg every 6 hours  - Omeprazole 40 mg BID  - Famotidine 40 mg nightly    Today we discussed her course, as well as typical course of HEG. No THC use, cannabinoid element not in play. Zofran barely works and phenergan only helps in the short term. B6/doxylamine and Reglan ineffective. Could not tolerate scope patch. Her concurrent GERD symptoms have gotten much worse, just now getting some relief from recent omeprazole addition, Tums dependent. She is justifiably frustrated and is despairing over the course ahead. I discussed that her mgmt thus far has well reflected national guidelines from ACOG. I also stressed that her prior course, with improvement in the second trimester, strongly suggests that her condition will improve in coming weeks. We discussed possible admission for IV antiemetics - she does not have reliable childcare. So long as she is able to keep fluids down and electrolytes remain stable, outpatient management is a reasonable option. She may be a candidate for scheduled outpatient IV hydration.     Recommendations:  1. Continue current regimen  2. Consider inpatient mgmt for intractable N/V or electrolyte perturbations  3. Continue care with GI

## 2024-02-16 DIAGNOSIS — K21.9 GASTROESOPHAGEAL REFLUX DISEASE: ICD-10-CM

## 2024-02-16 DIAGNOSIS — I85.00 ESOPHAGEAL VARICES: Primary | ICD-10-CM

## 2024-02-20 ENCOUNTER — HOSPITAL ENCOUNTER (OUTPATIENT)
Dept: RADIOLOGY | Facility: HOSPITAL | Age: 59
Discharge: HOME | End: 2024-02-20
Attending: INTERNAL MEDICINE
Payer: OTHER GOVERNMENT

## 2024-02-20 DIAGNOSIS — K21.9 GASTROESOPHAGEAL REFLUX DISEASE: ICD-10-CM

## 2024-02-20 DIAGNOSIS — I85.00 ESOPHAGEAL VARICES: ICD-10-CM

## 2024-02-20 PROCEDURE — 76700 US EXAM ABDOM COMPLETE: CPT | Mod: TC

## 2024-10-09 ENCOUNTER — HOSPITAL ENCOUNTER (EMERGENCY)
Facility: HOSPITAL | Age: 59
Discharge: HOME | End: 2024-10-09
Payer: OTHER GOVERNMENT

## 2024-10-09 VITALS
WEIGHT: 185 LBS | BODY MASS INDEX: 25.06 KG/M2 | SYSTOLIC BLOOD PRESSURE: 143 MMHG | TEMPERATURE: 98 F | DIASTOLIC BLOOD PRESSURE: 89 MMHG | HEIGHT: 72 IN | RESPIRATION RATE: 18 BRPM | HEART RATE: 68 BPM | OXYGEN SATURATION: 98 %

## 2024-10-09 DIAGNOSIS — G56.31: Primary | ICD-10-CM

## 2024-10-09 PROBLEM — G56.32: Status: ACTIVE | Noted: 2024-10-09

## 2024-10-09 PROCEDURE — 99281 EMR DPT VST MAYX REQ PHY/QHP: CPT

## 2024-10-09 NOTE — DISCHARGE INSTRUCTIONS
If you have increased numbness, tingling, pain or other issues please return to the ER for further evaluation.  Your condition will can require further testing such as EMG, MRI and others in order to assess your radial nerve damage.  The initial treatment for a radial nerve injury with your symptoms is physical therapy which she will need a referral for.  Please call the orthopedic referral in order to schedule an appointment as soon as possible.

## 2024-10-09 NOTE — ED PROVIDER NOTES
"Encounter Date: 10/9/2024       History     Chief Complaint   Patient presents with    Extremity Weakness    Arm Injury     Pt presented to ED per POV with c/o right forearm weakness over one week ago. Pt stated "it's funny bc I'm still able to do 100-200 push ups a day". Pt report he was in [place] and woke up with numbness to right forearm.      This 58 yo male pt is  c/o right forearm and wrist weakness over one week ago. Pt stated "it's funny bc I'm still able to do 100-200 push ups a day". Pt states "I like to soak in the tub and have fallen asleep a few times with my right elbow on the edge of the tub and it would hurt", Onset of symptoms 10 days ago.       Review of patient's allergies indicates:  No Known Allergies  Past Medical History:   Diagnosis Date    Alcoholic cirrhosis     Esophageal varices     Hypertension      Past Surgical History:   Procedure Laterality Date    EGD, WITH HEMORRHAGE CONTROL  8/13/2022    Procedure: EGD,WITH HEMORRHAGE CONTROL;  Surgeon: Anna Rodriguez III, MD;  Location: [facility];  Service: Gastroenterology;;    ESOPHAGOGASTRODUODENOSCOPY N/A 8/13/2022    Procedure: EGD (ESOPHAGOGASTRODUODENOSCOPY);  Surgeon: Anna Rodriguez III, MD;  Location: [facility] OR;  Service: Gastroenterology;  Laterality: N/A;    KNEE SURGERY Left      No family history on file.  Social History     Tobacco Use    Smoking status: Every Day     Types: Cigarettes, Cigars    Smokeless tobacco: Never   Substance Use Topics    Alcohol use: Yes    Drug use: Yes     Types: Marijuana     Comment: EDIBLE     Review of Systems   Musculoskeletal:         Right forearm and wrist weakness     All other systems reviewed and are negative.      Physical Exam     Initial Vitals [10/09/24 1344]   BP Pulse Resp Temp SpO2   135/79 66 18 98.4 °F (36.9 °C) 100 %      MAP       --         Physical Exam    Nursing note and vitals reviewed.  Constitutional: He appears well-developed and well-nourished.   HENT:   Head: Normocephalic and " "atraumatic. Mouth/Throat: Mucous membranes are normal.   Neck: Neck supple.   Normal range of motion.  Cardiovascular:  Normal rate, regular rhythm and normal heart sounds.           Pulmonary/Chest: Breath sounds normal.   Musculoskeletal:         General: Normal range of motion.      Cervical back: Normal range of motion and neck supple.      Comments: Bilateral upper extremity pulses 2+, cap refill brisk, limited flexion of rt wrist ,  strength mildly less than left wrist, all other extremities wnl     Neurological: He is alert and oriented to person, place, and time.   Skin: Skin is warm and dry. Capillary refill takes less than 2 seconds.   Psychiatric: He has a normal mood and affect. His behavior is normal. Judgment and thought content normal.         ED Course   Procedures  Labs Reviewed - No data to display       Imaging Results    None          Medications - No data to display  Medical Decision Making  This 60 yo male pt is  c/o right forearm and wrist weakness over one week ago. Pt stated "it's funny bc I'm still able to do 100-200 push ups a day". Pt states "I like to soak in the tub and have fallen asleep a few times with my right elbow on the edge of the tub and it would hurt", Onset of symptoms 10 days ago.   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  If he has any increase in pain, numbness, tingling or other concerns he will return to the ER for further evaluation.  I educated this patient concerning" Your condition will can require further testing such as EMG, MRI and others in order to assess your radial nerve damage.  The initial treatment for a radial nerve injury with your symptoms is physical therapy which she will need a referral for.  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

## 2024-10-09 NOTE — ED NOTES
Velcro wrist splint placed on pt's left arm - pt tolerated well.  Cap refill checked and present.  Pt verbalized an understanding of splint care and denied any questions or concerns.

## 2024-10-09 NOTE — ED NOTES
Pt verbalized an understanding of discharge instructions, the importance of OTC medication for pain management if needed, and making an ortho f/u apt.  Denied any questions or concerns.

## 2025-01-08 NOTE — TELEPHONE ENCOUNTER
Patient returned phone call to transplant  around 1430 stating that he is driving through bad weather but trying to make it to his appointments.   Coordinator reached out to  to get his appointments today that was cancelled earlier rescheduled today.  Called patient and explained that due to him running late, to go to his psych appointment first. Next go to the lab and last is his rescheduled CT at 5pm. Patient verbalized understanding.    no

## 2025-02-14 ENCOUNTER — LAB VISIT (OUTPATIENT)
Dept: LAB | Facility: HOSPITAL | Age: 60
End: 2025-02-14
Payer: OTHER GOVERNMENT

## 2025-02-14 DIAGNOSIS — I85.10 ESOPHAGEAL VARICES IN ALCOHOLIC CIRRHOSIS: ICD-10-CM

## 2025-02-14 DIAGNOSIS — K70.30 ESOPHAGEAL VARICES IN ALCOHOLIC CIRRHOSIS: ICD-10-CM

## 2025-02-14 DIAGNOSIS — K62.5 HEMORRHAGE OF RECTUM AND ANUS: Primary | ICD-10-CM

## 2025-02-14 DIAGNOSIS — D69.6 THROMBOCYTOPENIA, UNSPECIFIED: ICD-10-CM

## 2025-02-14 DIAGNOSIS — K70.30 ALCOHOLIC CIRRHOSIS OF LIVER: ICD-10-CM

## 2025-02-14 DIAGNOSIS — K64.1 SECOND DEGREE HEMORRHOIDS: ICD-10-CM

## 2025-02-14 LAB
AFP-TM SERPL-MCNC: 3.1 NG/ML
ALBUMIN SERPL-MCNC: 2.6 G/DL (ref 3.4–4.8)
ALBUMIN/GLOB SERPL: 0.5 RATIO (ref 1.1–2)
ALP SERPL-CCNC: 102 UNIT/L (ref 40–150)
ALT SERPL-CCNC: 29 UNIT/L (ref 0–55)
ANION GAP SERPL CALC-SCNC: 6 MEQ/L
AST SERPL-CCNC: 80 UNIT/L (ref 5–34)
BASOPHILS # BLD AUTO: 0.04 X10(3)/MCL
BASOPHILS NFR BLD AUTO: 0.9 %
BILIRUB SERPL-MCNC: 6.4 MG/DL
BUN SERPL-MCNC: 8.5 MG/DL (ref 8.4–25.7)
CALCIUM SERPL-MCNC: 8.1 MG/DL (ref 8.8–10)
CHLORIDE SERPL-SCNC: 106 MMOL/L (ref 98–107)
CO2 SERPL-SCNC: 24 MMOL/L (ref 23–31)
CREAT SERPL-MCNC: 0.96 MG/DL (ref 0.72–1.25)
CREAT/UREA NIT SERPL: 9
EOSINOPHIL # BLD AUTO: 0.19 X10(3)/MCL (ref 0–0.9)
EOSINOPHIL NFR BLD AUTO: 4.3 %
ERYTHROCYTE [DISTWIDTH] IN BLOOD BY AUTOMATED COUNT: 18.9 % (ref 11.5–17)
GFR SERPLBLD CREATININE-BSD FMLA CKD-EPI: >60 ML/MIN/1.73/M2
GLOBULIN SER-MCNC: 5.2 GM/DL (ref 2.4–3.5)
GLUCOSE SERPL-MCNC: 110 MG/DL (ref 82–115)
HCT VFR BLD AUTO: 27.2 % (ref 42–52)
HGB BLD-MCNC: 9.3 G/DL (ref 14–18)
IMM GRANULOCYTES # BLD AUTO: 0.01 X10(3)/MCL (ref 0–0.04)
IMM GRANULOCYTES NFR BLD AUTO: 0.2 %
LYMPHOCYTES # BLD AUTO: 0.7 X10(3)/MCL (ref 0.6–4.6)
LYMPHOCYTES NFR BLD AUTO: 16 %
MCH RBC QN AUTO: 30.9 PG (ref 27–31)
MCHC RBC AUTO-ENTMCNC: 34.2 G/DL (ref 33–36)
MCV RBC AUTO: 90.4 FL (ref 80–94)
MONOCYTES # BLD AUTO: 0.56 X10(3)/MCL (ref 0.1–1.3)
MONOCYTES NFR BLD AUTO: 12.8 %
NEUTROPHILS # BLD AUTO: 2.87 X10(3)/MCL (ref 2.1–9.2)
NEUTROPHILS NFR BLD AUTO: 65.8 %
NRBC BLD AUTO-RTO: 0 %
PLATELET # BLD AUTO: 41 X10(3)/MCL (ref 130–400)
PLATELET # BLD EST: ABNORMAL 10*3/UL
PLATELETS.RETICULATED NFR BLD AUTO: 12.3 % (ref 0.9–11.2)
PMV BLD AUTO: ABNORMAL FL
POTASSIUM SERPL-SCNC: 3.2 MMOL/L (ref 3.5–5.1)
PROT SERPL-MCNC: 7.8 GM/DL (ref 5.8–7.6)
RBC # BLD AUTO: 3.01 X10(6)/MCL (ref 4.7–6.1)
RBC MORPH BLD: NORMAL
SODIUM SERPL-SCNC: 136 MMOL/L (ref 136–145)
WBC # BLD AUTO: 4.37 X10(3)/MCL (ref 4.5–11.5)

## 2025-02-14 PROCEDURE — 80053 COMPREHEN METABOLIC PANEL: CPT

## 2025-02-14 PROCEDURE — 36415 COLL VENOUS BLD VENIPUNCTURE: CPT

## 2025-02-14 PROCEDURE — 85025 COMPLETE CBC W/AUTO DIFF WBC: CPT

## 2025-02-14 PROCEDURE — 82105 ALPHA-FETOPROTEIN SERUM: CPT

## 2025-02-19 DIAGNOSIS — K21.9 ESOPHAGEAL REFLUX: Primary | ICD-10-CM

## 2025-02-20 ENCOUNTER — HOSPITAL ENCOUNTER (OUTPATIENT)
Dept: RADIOLOGY | Facility: HOSPITAL | Age: 60
Discharge: HOME | End: 2025-02-20
Attending: INTERNAL MEDICINE
Payer: MEDICAID

## 2025-02-20 DIAGNOSIS — K21.9 ESOPHAGEAL REFLUX: ICD-10-CM

## 2025-02-20 PROCEDURE — 76700 US EXAM ABDOM COMPLETE: CPT | Mod: TC

## 2025-04-04 ENCOUNTER — HOSPITAL ENCOUNTER (EMERGENCY)
Facility: HOSPITAL | Age: 60
Discharge: ANOTHER HOSPITAL | End: 2025-04-05
Attending: OTOLARYNGOLOGY
Payer: OTHER GOVERNMENT

## 2025-04-04 DIAGNOSIS — K70.30 ALCOHOLIC CIRRHOSIS, UNSPECIFIED WHETHER ASCITES PRESENT: ICD-10-CM

## 2025-04-04 DIAGNOSIS — D64.9 ANEMIA, UNSPECIFIED TYPE: ICD-10-CM

## 2025-04-04 DIAGNOSIS — D69.6 THROMBOCYTOPENIA: Primary | ICD-10-CM

## 2025-04-04 DIAGNOSIS — N17.9 AKI (ACUTE KIDNEY INJURY): ICD-10-CM

## 2025-04-04 DIAGNOSIS — D69.2 PURPURA: ICD-10-CM

## 2025-04-04 LAB
ABORH RETYPE: NORMAL
ALBUMIN SERPL-MCNC: 3.3 G/DL (ref 3.4–5)
ALBUMIN/GLOB SERPL: 0.8 RATIO
ALP SERPL-CCNC: 167 UNIT/L (ref 50–144)
ALT SERPL-CCNC: 33 UNIT/L (ref 1–45)
AMPHET UR QL SCN: NEGATIVE
ANION GAP SERPL CALC-SCNC: 2 MEQ/L (ref 2–13)
APTT PPP: 24.1 SECONDS (ref 23–29.4)
AST SERPL-CCNC: 81 UNIT/L (ref 17–59)
BARBITURATE SCN PRESENT UR: NEGATIVE
BASOPHILS # BLD AUTO: 0.03 X10(3)/MCL (ref 0.01–0.08)
BASOPHILS NFR BLD AUTO: 0.9 % (ref 0.1–1.2)
BENZODIAZ UR QL SCN: NEGATIVE
BILIRUB SERPL-MCNC: 4.2 MG/DL (ref 0–1)
BILIRUB UR QL STRIP.AUTO: NEGATIVE
BUN SERPL-MCNC: 11 MG/DL (ref 7–20)
CALCIUM SERPL-MCNC: 8.5 MG/DL (ref 8.4–10.2)
CANNABINOIDS UR QL SCN: NEGATIVE
CHLORIDE SERPL-SCNC: 107 MMOL/L (ref 98–110)
CLARITY UR: CLEAR
CO2 SERPL-SCNC: 29 MMOL/L (ref 21–32)
COCAINE UR QL SCN: POSITIVE
COLOR UR AUTO: YELLOW
CREAT SERPL-MCNC: 1.39 MG/DL (ref 0.66–1.25)
CREAT/UREA NIT SERPL: 8 (ref 12–20)
EOSINOPHIL # BLD AUTO: 0.11 X10(3)/MCL (ref 0.04–0.54)
EOSINOPHIL NFR BLD AUTO: 3.3 % (ref 0.7–7)
ERYTHROCYTE [DISTWIDTH] IN BLOOD BY AUTOMATED COUNT: 18.4 %
ETHANOL BLD-MCNC: <0.01 G/DL
ETHANOL SERPL-MCNC: <10 MG/DL
GFR SERPLBLD CREATININE-BSD FMLA CKD-EPI: 58 ML/MIN/1.73/M2
GLOBULIN SER-MCNC: 4.3 GM/DL (ref 2–3.9)
GLUCOSE SERPL-MCNC: 99 MG/DL (ref 70–115)
GLUCOSE UR QL STRIP: NEGATIVE
GROUP & RH: NORMAL
HCT VFR BLD AUTO: 26.1 % (ref 36–52)
HGB BLD-MCNC: 8.8 G/DL (ref 13–18)
HGB UR QL STRIP: NEGATIVE
IMM GRANULOCYTES # BLD AUTO: 0.01 X10(3)/MCL (ref 0–0.03)
IMM GRANULOCYTES NFR BLD AUTO: 0.3 % (ref 0–0.5)
INDIRECT COOMBS: NORMAL
INR PPP: 2
KETONES UR QL STRIP: NEGATIVE
LEUKOCYTE ESTERASE UR QL STRIP: NEGATIVE
LYMPHOCYTES # BLD AUTO: 0.62 X10(3)/MCL (ref 1.32–3.57)
LYMPHOCYTES NFR BLD AUTO: 18.6 % (ref 20–55)
MCH RBC QN AUTO: 30.1 PG (ref 27–34)
MCHC RBC AUTO-ENTMCNC: 33.7 G/DL (ref 31–37)
MCV RBC AUTO: 89.4 FL (ref 79–99)
METHADONE UR QL SCN: NEGATIVE
MONOCYTES # BLD AUTO: 0.44 X10(3)/MCL (ref 0.3–0.82)
MONOCYTES NFR BLD AUTO: 13.2 % (ref 4.7–12.5)
NEUTROPHILS # BLD AUTO: 2.12 X10(3)/MCL (ref 1.78–5.38)
NEUTROPHILS NFR BLD AUTO: 63.7 % (ref 37–73)
NITRITE UR QL STRIP: NEGATIVE
OPIATES UR QL SCN: NEGATIVE
PCP UR QL: NEGATIVE
PH UR STRIP: 7 [PH]
PH UR: 7 [PH] (ref 5–8)
PLATELET # BLD AUTO: 31 X10(3)/MCL (ref 140–371)
PLATELET # BLD EST: ABNORMAL 10*3/UL
PMV BLD AUTO: ABNORMAL FL
POIKILOCYTOSIS BLD QL SMEAR: SLIGHT
POTASSIUM SERPL-SCNC: 3.8 MMOL/L (ref 3.5–5.1)
PROT SERPL-MCNC: 7.6 GM/DL (ref 6.3–8.2)
PROT UR QL STRIP: NEGATIVE
PROTHROMBIN TIME: 19.5 SECONDS (ref 9.3–11.9)
RBC # BLD AUTO: 2.92 X10(6)/MCL (ref 4–6)
RBC MORPH BLD: ABNORMAL
SODIUM SERPL-SCNC: 138 MMOL/L (ref 136–145)
SP GR UR STRIP.AUTO: 1.01 (ref 1–1.03)
SPECIMEN OUTDATE: NORMAL
TARGETS BLD QL SMEAR: SLIGHT
UROBILINOGEN UR STRIP-ACNC: 4
WBC # BLD AUTO: 3.33 X10(3)/MCL (ref 4–11.5)

## 2025-04-04 PROCEDURE — 80053 COMPREHEN METABOLIC PANEL: CPT

## 2025-04-04 PROCEDURE — 81003 URINALYSIS AUTO W/O SCOPE: CPT

## 2025-04-04 PROCEDURE — 82077 ASSAY SPEC XCP UR&BREATH IA: CPT

## 2025-04-04 PROCEDURE — 85025 COMPLETE CBC W/AUTO DIFF WBC: CPT

## 2025-04-04 PROCEDURE — 85730 THROMBOPLASTIN TIME PARTIAL: CPT

## 2025-04-04 PROCEDURE — 80307 DRUG TEST PRSMV CHEM ANLYZR: CPT

## 2025-04-04 PROCEDURE — 85045 AUTOMATED RETICULOCYTE COUNT: CPT

## 2025-04-04 PROCEDURE — 85610 PROTHROMBIN TIME: CPT

## 2025-04-04 PROCEDURE — 86901 BLOOD TYPING SEROLOGIC RH(D): CPT

## 2025-04-04 PROCEDURE — 99285 EMERGENCY DEPT VISIT HI MDM: CPT | Mod: 25

## 2025-04-04 NOTE — ED PROVIDER NOTES
Encounter Date: 4/4/2025       History     Chief Complaint   Patient presents with    Abnormal Labs     [facility], platelets low and recommended to go to nearest ER.  C/o new bruising.      See MDM    The history is provided by the patient. No  was used.     Review of patient's allergies indicates:  No Known Allergies  Past Medical History:   Diagnosis Date    Alcoholic cirrhosis     Esophageal varices     Hypertension      Past Surgical History:   Procedure Laterality Date    EGD, WITH HEMORRHAGE CONTROL  8/13/2022    Procedure: EGD,WITH HEMORRHAGE CONTROL;  Surgeon: Anna Rodriguez III, MD;  Location: [facility];  Service: Gastroenterology;;    ESOPHAGOGASTRODUODENOSCOPY N/A 8/13/2022    Procedure: EGD (ESOPHAGOGASTRODUODENOSCOPY);  Surgeon: Anna Rodriguez III, MD;  Location: [facility];  Service: Gastroenterology;  Laterality: N/A;    KNEE SURGERY Left      No family history on file.  Social History[1]  Review of Systems   Constitutional:  Negative for chills and fever.   Respiratory:  Negative for shortness of breath.    Cardiovascular:  Negative for chest pain.   Gastrointestinal:  Negative for abdominal pain, anal bleeding, blood in stool, constipation, diarrhea, nausea and vomiting.   Genitourinary:  Negative for hematuria.   Skin:  Positive for color change.   Neurological:  Negative for dizziness, syncope and weakness.   All other systems reviewed and are negative.      Physical Exam     Initial Vitals [04/04/25 1657]   BP Pulse Resp Temp SpO2   136/86 78 16 98.4 °F (36.9 °C) 97 %      MAP       --         Physical Exam    Nursing note and vitals reviewed.  Constitutional: He appears well-developed and well-nourished. He is not diaphoretic. No distress.   HENT:   Head: Normocephalic and atraumatic.   Cardiovascular:  Normal rate, regular rhythm and normal heart sounds.     Exam reveals no gallop and no friction rub.       No murmur heard.  Pulmonary/Chest: Breath sounds  "normal. No respiratory distress. He has no wheezes. He has no rhonchi. He has no rales.   Abdominal: Abdomen is soft. Bowel sounds are normal. He exhibits no distension and no mass. There is no abdominal tenderness.   No Grey-Rogel or Hunter sign  Not able to palpate liver border There is no rebound and no guarding.   Musculoskeletal:         General: Normal range of motion.     Neurological: He is alert and oriented to person, place, and time.   Skin: Skin is warm and dry.   Palpable purpura on left wrist, right forearm ulnar aspect at elbow, right upper inner arm.    Psychiatric: He has a normal mood and affect. His behavior is normal.         ED Course   Procedures  Labs Reviewed   COMPREHENSIVE METABOLIC PANEL - Abnormal       Result Value    Sodium 138      Potassium 3.8      Chloride 107      CO2 29      Glucose 99      Blood Urea Nitrogen 11      Creatinine 1.39 (*)     Calcium 8.5      Protein Total 7.6      Albumin 3.3 (*)     Globulin 4.3 (*)     Albumin/Globulin Ratio 0.8      Bilirubin Total 4.2 (*)      (*)     ALT 33      AST 81 (*)     eGFR 58      Anion Gap 2.0      BUN/Creatinine Ratio 8 (*)    CBC WITH DIFFERENTIAL - Abnormal    WBC 3.33 (*)     RBC 2.92 (*)     Hgb 8.8 (*)     Hct 26.1 (*)     MCV 89.4      MCH 30.1      MCHC 33.7      RDW 18.4      Platelet 31 (*)     MPV        Neut % 63.7      Lymph % 18.6 (*)     Mono % 13.2 (*)     Eos % 3.3      Basophil % 0.9      Lymph # 0.62 (*)     Neut # 2.12      Mono # 0.44      Eos # 0.11      Baso # 0.03      Imm Gran # 0.01      Imm Grans % 0.3     PROTIME-INR - Abnormal    PT 19.5 (*)     INR 2.0      Narrative:     The analyzer showed a "slight coagulation error"; therefore, I had the patient redrawn. The second sample gave the same error. /CH  Protimes are used to monitor anticoagulant agents such as warfarin. PT INR values are based on the current patient normal mean and the MARVIN value for the specific instrument reagent used.  **Routine " "theraputic target values for the INR are 2.0-3.0**   BLOOD SMEAR MICROSCOPIC EXAM (OLG) - Abnormal    RBC Morph Abnormal (*)     Poikilocytosis Slight (*)     Target Cells Slight (*)     Platelets Decreased (*)    APTT - Normal    PTT 24.1      Narrative:     The analyzer showed a "slight coagulation error"; therefore, I had the patient redrawn. The second sample gave the same error. /CH   ALCOHOL,MEDICAL (ETHANOL) - Normal    Ethanol Level <10.0      Alcohol, Legal Level <0.010     CBC W/ AUTO DIFFERENTIAL    Narrative:     The following orders were created for panel order CBC auto differential.  Procedure                               Abnormality         Status                     ---------                               -----------         ------                     CBC with Differential[[id number]]       Abnormal            Final result                 Please view results for these tests on the individual orders.   RETICULOCYTES   DRUG SCREEN, URINE (BEAKER)   URINALYSIS, REFLEX TO URINE CULTURE   TYPE & SCREEN    Group & Rh A POS      Indirect Benito GEL NEG      Specimen Outdate 04/07/2025 23:59     ABORH RETYPE    ABORH Retype A POS            Imaging Results    None          Medications - No data to display  Medical Decision Making  Patient is a 60-year-old male with past medical history of alcoholic cirrhosis, esophageal varices, hypertension who presents with bruising and low platelets since yesterday.  States that he is in town from [place] visiting his parents and was at the gym yesterday and noticed bruising in his arm and wrist and [facility].  Got a call this morning to come to the emergency room for low platelets.  States that the bruising is painful.  Patient denies any abdominal pain, hemoptysis, hematemesis, melena, blood in stool.  States that he has received blood transfusions before for esophageal varices, unsure if he has ever received platelets.  Has " tolerated transfusions well.  Denies recent changes in medications.  Takes propranolol and Protonix daily. Last alcohol use last night with 1.5 glasses of wine, heavy use beginning of March. Smokes marijuana and uses cocaine. Last cocaine use last week.     Hemoglobin 8.8, hematocrit 26.1.  Platelets 31.  Historically patient has low platelets, baseline about mid to low 40s.  Has been evaluated for this problem in the past. Creatinine elevated at 1.39.  BUN 11, GFR 58, gap 2    Amount and/or Complexity of Data Reviewed  Labs: ordered. Decision-making details documented in ED Course.  Discussion of management or test interpretation with external provider(s): Discussed pt with Antoinette with hospital medicine at [facility], accepted for transfer, Dr. Reyes accepting physician.   Discussed pt with Dr. Jones who had face-to-face with patient and is in agreement with the plan.       Additional MDM:   Differential Diagnosis:   Other: The following diagnoses were also considered and will be evaluated: ITP, TTP and HUS.                                   Clinical Impression:  Final diagnoses:  [D69.6] Thrombocytopenia (Primary)  [D69.2] Purpura  [N17.9] DEMETRICE (acute kidney injury)  [D64.9] Anemia, unspecified type  [K70.30] Alcoholic cirrhosis, unspecified whether ascites present          ED Disposition Condition    Transfer to Another Facility Stable                  Marge Brar PA  04/04/25 2018         [1]   Social History  Tobacco Use    Smoking status: Every Day     Types: Cigars    Smokeless tobacco: Never   Substance Use Topics    Alcohol use: Yes    Drug use: Yes     Types: Marijuana     Comment: EDIBLE        Marge Brar PA  04/04/25 2023

## 2025-04-05 ENCOUNTER — HOSPITAL ENCOUNTER (INPATIENT)
Facility: HOSPITAL | Age: 60
LOS: 2 days | Discharge: HOME | DRG: 433 | End: 2025-04-07
Attending: STUDENT IN AN ORGANIZED HEALTH CARE EDUCATION/TRAINING PROGRAM | Admitting: STUDENT IN AN ORGANIZED HEALTH CARE EDUCATION/TRAINING PROGRAM
Payer: OTHER GOVERNMENT

## 2025-04-05 VITALS
RESPIRATION RATE: 18 BRPM | WEIGHT: 179 LBS | BODY MASS INDEX: 24.24 KG/M2 | OXYGEN SATURATION: 100 % | HEART RATE: 68 BPM | DIASTOLIC BLOOD PRESSURE: 70 MMHG | HEIGHT: 72 IN | TEMPERATURE: 98 F | SYSTOLIC BLOOD PRESSURE: 117 MMHG

## 2025-04-05 DIAGNOSIS — R07.9 CHEST PAIN: ICD-10-CM

## 2025-04-05 DIAGNOSIS — D69.6 THROMBOCYTOPENIA: ICD-10-CM

## 2025-04-05 LAB
ABO + RH BLD: NORMAL
ALBUMIN SERPL-MCNC: 2.4 G/DL (ref 3.4–4.8)
ALBUMIN/GLOB SERPL: 0.6 RATIO (ref 1.1–2)
ALP SERPL-CCNC: 100 UNIT/L (ref 40–150)
ALT SERPL-CCNC: 22 UNIT/L (ref 0–55)
ANION GAP SERPL CALC-SCNC: 9 MEQ/L
APTT PPP: 45.3 SECONDS (ref 23.2–33.7)
AST SERPL-CCNC: 54 UNIT/L (ref 11–45)
BASOPHILS # BLD AUTO: 0.01 X10(3)/MCL
BASOPHILS NFR BLD AUTO: 0.4 %
BILIRUB SERPL-MCNC: 5.2 MG/DL
BLD PROD TYP BPU: NORMAL
BLOOD UNIT EXPIRATION DATE: NORMAL
BLOOD UNIT TYPE CODE: 6200
BUN SERPL-MCNC: 10.3 MG/DL (ref 8.4–25.7)
CALCIUM SERPL-MCNC: 7.9 MG/DL (ref 8.8–10)
CHLORIDE SERPL-SCNC: 106 MMOL/L (ref 98–107)
CO2 SERPL-SCNC: 23 MMOL/L (ref 23–31)
COLOR STL: NORMAL
CONSISTENCY STL: NORMAL
CREAT SERPL-MCNC: 0.91 MG/DL (ref 0.72–1.25)
CREAT/UREA NIT SERPL: 11
CROSSMATCH INTERPRETATION: NORMAL
DISPENSE STATUS: NORMAL
EOSINOPHIL # BLD AUTO: 0.07 X10(3)/MCL (ref 0–0.9)
EOSINOPHIL NFR BLD AUTO: 3.1 %
ERYTHROCYTE [DISTWIDTH] IN BLOOD BY AUTOMATED COUNT: 18.8 % (ref 11.5–17)
FERRITIN SERPL-MCNC: 187.7 NG/ML (ref 21.81–274.66)
FIBRINOGEN PPP-MCNC: 106 MG/DL (ref 210–463)
FOLATE SERPL-MCNC: 8.6 NG/ML (ref 7–31.4)
GFR SERPLBLD CREATININE-BSD FMLA CKD-EPI: >60 ML/MIN/1.73/M2
GLOBULIN SER-MCNC: 4.2 GM/DL (ref 2.4–3.5)
GLUCOSE SERPL-MCNC: 95 MG/DL (ref 82–115)
HAPTOGLOB SERPL-MCNC: <8 MG/DL (ref 40–368)
HCT VFR BLD AUTO: 23.1 % (ref 42–52)
HEMOCCULT SP1 STL QL: NEGATIVE
HGB BLD-MCNC: 8 G/DL (ref 14–18)
IMM GRANULOCYTES # BLD AUTO: 0.01 X10(3)/MCL (ref 0–0.04)
IMM GRANULOCYTES NFR BLD AUTO: 0.4 %
INR PPP: 2.3
IRON SATN MFR SERPL: 67 % (ref 20–50)
IRON SERPL-MCNC: 149 UG/DL (ref 65–175)
LDH SERPL-CCNC: 197 U/L (ref 125–220)
LYMPHOCYTES # BLD AUTO: 0.68 X10(3)/MCL (ref 0.6–4.6)
LYMPHOCYTES NFR BLD AUTO: 30 %
MAGNESIUM SERPL-MCNC: 1.2 MG/DL (ref 1.6–2.6)
MCH RBC QN AUTO: 30.4 PG (ref 27–31)
MCHC RBC AUTO-ENTMCNC: 34.6 G/DL (ref 33–36)
MCV RBC AUTO: 87.8 FL (ref 80–94)
MONOCYTES # BLD AUTO: 0.32 X10(3)/MCL (ref 0.1–1.3)
MONOCYTES NFR BLD AUTO: 14.1 %
NEUTROPHILS # BLD AUTO: 1.18 X10(3)/MCL (ref 2.1–9.2)
NEUTROPHILS NFR BLD AUTO: 52 %
NRBC BLD AUTO-RTO: 0 %
PLATELET # BLD AUTO: 23 X10(3)/MCL (ref 130–400)
PLATELETS.RETICULATED NFR BLD AUTO: 12.8 % (ref 0.9–11.2)
PMV BLD AUTO: ABNORMAL FL
POTASSIUM SERPL-SCNC: 3.2 MMOL/L (ref 3.5–5.1)
PROT SERPL-MCNC: 6.6 GM/DL (ref 5.8–7.6)
PROTHROMBIN TIME: 25.9 SECONDS (ref 12.5–14.5)
RBC # BLD AUTO: 2.63 X10(6)/MCL (ref 4.7–6.1)
RET# (OHS): 0.05 X10E6/UL (ref 0.03–0.1)
RETICULOCYTE COUNT AUTOMATED (OLG): 1.81 % (ref 1.1–2.1)
SODIUM SERPL-SCNC: 138 MMOL/L (ref 136–145)
TIBC SERPL-MCNC: 224 UG/DL (ref 250–450)
TIBC SERPL-MCNC: 75 UG/DL (ref 60–240)
TRANSFERRIN SERPL-MCNC: 172 MG/DL (ref 174–364)
UNIT NUMBER: NORMAL
VIT B12 SERPL-MCNC: >2000 PG/ML (ref 213–816)
WBC # BLD AUTO: 2.27 X10(3)/MCL (ref 4.5–11.5)

## 2025-04-05 PROCEDURE — 85610 PROTHROMBIN TIME: CPT | Performed by: NURSE PRACTITIONER

## 2025-04-05 PROCEDURE — P9035 PLATELET PHERES LEUKOREDUCED: HCPCS | Performed by: INTERNAL MEDICINE

## 2025-04-05 PROCEDURE — 85025 COMPLETE CBC W/AUTO DIFF WBC: CPT | Performed by: NURSE PRACTITIONER

## 2025-04-05 PROCEDURE — 63600175 PHARM REV CODE 636 W HCPCS: Performed by: NURSE PRACTITIONER

## 2025-04-05 PROCEDURE — 25000003 PHARM REV CODE 250: Performed by: STUDENT IN AN ORGANIZED HEALTH CARE EDUCATION/TRAINING PROGRAM

## 2025-04-05 PROCEDURE — 63600175 PHARM REV CODE 636 W HCPCS: Performed by: INTERNAL MEDICINE

## 2025-04-05 PROCEDURE — 83010 ASSAY OF HAPTOGLOBIN QUANT: CPT | Performed by: NURSE PRACTITIONER

## 2025-04-05 PROCEDURE — 82728 ASSAY OF FERRITIN: CPT | Performed by: NURSE PRACTITIONER

## 2025-04-05 PROCEDURE — 83615 LACTATE (LD) (LDH) ENZYME: CPT | Performed by: NURSE PRACTITIONER

## 2025-04-05 PROCEDURE — 36430 TRANSFUSION BLD/BLD COMPNT: CPT

## 2025-04-05 PROCEDURE — 86023 IMMUNOGLOBULIN ASSAY: CPT | Performed by: NURSE PRACTITIONER

## 2025-04-05 PROCEDURE — 25000003 PHARM REV CODE 250: Performed by: INTERNAL MEDICINE

## 2025-04-05 PROCEDURE — 83540 ASSAY OF IRON: CPT | Performed by: NURSE PRACTITIONER

## 2025-04-05 PROCEDURE — 82746 ASSAY OF FOLIC ACID SERUM: CPT | Performed by: NURSE PRACTITIONER

## 2025-04-05 PROCEDURE — 21400001 HC TELEMETRY ROOM

## 2025-04-05 PROCEDURE — 80053 COMPREHEN METABOLIC PANEL: CPT | Performed by: NURSE PRACTITIONER

## 2025-04-05 PROCEDURE — 83735 ASSAY OF MAGNESIUM: CPT | Performed by: NURSE PRACTITIONER

## 2025-04-05 PROCEDURE — 25000003 PHARM REV CODE 250: Performed by: NURSE PRACTITIONER

## 2025-04-05 PROCEDURE — 85384 FIBRINOGEN ACTIVITY: CPT | Performed by: NURSE PRACTITIONER

## 2025-04-05 PROCEDURE — 85730 THROMBOPLASTIN TIME PARTIAL: CPT | Performed by: NURSE PRACTITIONER

## 2025-04-05 PROCEDURE — 82272 OCCULT BLD FECES 1-3 TESTS: CPT | Performed by: NURSE PRACTITIONER

## 2025-04-05 PROCEDURE — 82607 VITAMIN B-12: CPT | Performed by: NURSE PRACTITIONER

## 2025-04-05 RX ORDER — ACETAMINOPHEN 500 MG
1000 TABLET ORAL EVERY 6 HOURS PRN
Status: DISCONTINUED | OUTPATIENT
Start: 2025-04-05 | End: 2025-04-07 | Stop reason: HOSPADM

## 2025-04-05 RX ORDER — THIAMINE HCL 100 MG
100 TABLET ORAL DAILY
Status: DISCONTINUED | OUTPATIENT
Start: 2025-04-05 | End: 2025-04-07 | Stop reason: HOSPADM

## 2025-04-05 RX ORDER — GLUCAGON 1 MG
1 KIT INJECTION
Status: DISCONTINUED | OUTPATIENT
Start: 2025-04-05 | End: 2025-04-07 | Stop reason: HOSPADM

## 2025-04-05 RX ORDER — PROCHLORPERAZINE EDISYLATE 5 MG/ML
5 INJECTION INTRAMUSCULAR; INTRAVENOUS EVERY 6 HOURS PRN
Status: DISCONTINUED | OUTPATIENT
Start: 2025-04-05 | End: 2025-04-07 | Stop reason: HOSPADM

## 2025-04-05 RX ORDER — NALOXONE HCL 0.4 MG/ML
0.02 VIAL (ML) INJECTION
Status: DISCONTINUED | OUTPATIENT
Start: 2025-04-05 | End: 2025-04-07 | Stop reason: HOSPADM

## 2025-04-05 RX ORDER — CHLORDIAZEPOXIDE HYDROCHLORIDE 25 MG/1
25 CAPSULE, GELATIN COATED ORAL 4 TIMES DAILY PRN
Status: DISCONTINUED | OUTPATIENT
Start: 2025-04-05 | End: 2025-04-07 | Stop reason: HOSPADM

## 2025-04-05 RX ORDER — ALUMINUM HYDROXIDE, MAGNESIUM HYDROXIDE, AND SIMETHICONE 1200; 120; 1200 MG/30ML; MG/30ML; MG/30ML
30 SUSPENSION ORAL 4 TIMES DAILY PRN
Status: DISCONTINUED | OUTPATIENT
Start: 2025-04-05 | End: 2025-04-07 | Stop reason: HOSPADM

## 2025-04-05 RX ORDER — HYDROCODONE BITARTRATE AND ACETAMINOPHEN 500; 5 MG/1; MG/1
TABLET ORAL
Status: DISCONTINUED | OUTPATIENT
Start: 2025-04-05 | End: 2025-04-07 | Stop reason: HOSPADM

## 2025-04-05 RX ORDER — FOLIC ACID 1 MG/1
1 TABLET ORAL DAILY
Status: DISCONTINUED | OUTPATIENT
Start: 2025-04-05 | End: 2025-04-07 | Stop reason: HOSPADM

## 2025-04-05 RX ORDER — POTASSIUM CHLORIDE 20 MEQ/1
40 TABLET, EXTENDED RELEASE ORAL 2 TIMES DAILY
Status: COMPLETED | OUTPATIENT
Start: 2025-04-05 | End: 2025-04-07

## 2025-04-05 RX ORDER — ONDANSETRON HYDROCHLORIDE 2 MG/ML
4 INJECTION, SOLUTION INTRAVENOUS EVERY 4 HOURS PRN
Status: DISCONTINUED | OUTPATIENT
Start: 2025-04-05 | End: 2025-04-07 | Stop reason: HOSPADM

## 2025-04-05 RX ORDER — IBUPROFEN 200 MG
16 TABLET ORAL
Status: DISCONTINUED | OUTPATIENT
Start: 2025-04-05 | End: 2025-04-07 | Stop reason: HOSPADM

## 2025-04-05 RX ORDER — BISACODYL 10 MG/1
10 SUPPOSITORY RECTAL DAILY PRN
Status: DISCONTINUED | OUTPATIENT
Start: 2025-04-05 | End: 2025-04-07 | Stop reason: HOSPADM

## 2025-04-05 RX ORDER — MUPIROCIN 20 MG/G
OINTMENT TOPICAL 2 TIMES DAILY
Status: DISCONTINUED | OUTPATIENT
Start: 2025-04-05 | End: 2025-04-07 | Stop reason: HOSPADM

## 2025-04-05 RX ORDER — IBUPROFEN 200 MG
24 TABLET ORAL
Status: DISCONTINUED | OUTPATIENT
Start: 2025-04-05 | End: 2025-04-07 | Stop reason: HOSPADM

## 2025-04-05 RX ORDER — MAGNESIUM SULFATE HEPTAHYDRATE 40 MG/ML
2 INJECTION, SOLUTION INTRAVENOUS ONCE
Status: COMPLETED | OUTPATIENT
Start: 2025-04-05 | End: 2025-04-05

## 2025-04-05 RX ORDER — AMOXICILLIN 250 MG
1 CAPSULE ORAL 2 TIMES DAILY PRN
Status: DISCONTINUED | OUTPATIENT
Start: 2025-04-05 | End: 2025-04-07 | Stop reason: HOSPADM

## 2025-04-05 RX ORDER — SODIUM CHLORIDE 0.9 % (FLUSH) 0.9 %
10 SYRINGE (ML) INJECTION
Status: DISCONTINUED | OUTPATIENT
Start: 2025-04-05 | End: 2025-04-07 | Stop reason: HOSPADM

## 2025-04-05 RX ORDER — TALC
6 POWDER (GRAM) TOPICAL NIGHTLY PRN
Status: DISCONTINUED | OUTPATIENT
Start: 2025-04-05 | End: 2025-04-07 | Stop reason: HOSPADM

## 2025-04-05 RX ORDER — THIAMINE HYDROCHLORIDE 100 MG/ML
400 INJECTION, SOLUTION INTRAMUSCULAR; INTRAVENOUS ONCE
Status: COMPLETED | OUTPATIENT
Start: 2025-04-05 | End: 2025-04-05

## 2025-04-05 RX ADMIN — THERA TABS 1 TABLET: TAB at 08:04

## 2025-04-05 RX ADMIN — POTASSIUM CHLORIDE 40 MEQ: 1500 TABLET, EXTENDED RELEASE ORAL at 08:04

## 2025-04-05 RX ADMIN — MAGNESIUM SULFATE HEPTAHYDRATE 2 G: 40 INJECTION, SOLUTION INTRAVENOUS at 02:04

## 2025-04-05 RX ADMIN — MUPIROCIN: 20 OINTMENT TOPICAL at 08:04

## 2025-04-05 RX ADMIN — THIAMINE HYDROCHLORIDE 400 MG: 100 INJECTION, SOLUTION INTRAMUSCULAR; INTRAVENOUS at 04:04

## 2025-04-05 RX ADMIN — THIAMINE HCL TAB 100 MG 100 MG: 100 TAB at 08:04

## 2025-04-05 RX ADMIN — FOLIC ACID 1 MG: 1 TABLET ORAL at 08:04

## 2025-04-05 NOTE — H&P
[facility]  Hospital Medicine History & Physical Examination       Patient Name: Yong Townsend  MRN: [id number]  Patient Class: IP- Inpatient   Admission Date: 04/05/2025   Admitting Service: Hospital Medicine   Length of Stay: 0  Attending Physician: Dr. Lee   Primary Care Provider: Karey, Primary Doctor  Face-to-Face encounter date: 04/05/2025  Code Status: Full  Chief Complaint: No chief complaint on file.      Patient information was obtained from patient, patient's family, past medical records and ED records.    HISTORY OF PRESENT ILLNESS:   60 y.o. male with a PMHx of hypertension, alcoholism, gout, hemorrhoids, hyperlipidemia, polysubstance abuse, insomnia, PTSD, schizophrenia, vitamin-D deficiency, alcoholic cirrhosis, esophageal varices who presented to [facility] on 4/5/2025 via EMS as a transfer from [facility] ED for higher level of care.  He initially presented to the outlying ED at the direction of his PCP for evaluation of abnormal outpatient labs demonstrating low platelets.  Patient complained of new bruising to his arm and wrist.  He denied any abdominal pain, hemoptysis, hematemesis, melena, blood in stool.  He reports history of blood transfusion due to esophageal varices.  Last alcohol intake x1 day prior when he drank 1.5 glasses of wine.  Last cocaine use x1 week ago.      Initial labs notable for creatinine 1.39, total bili 4.2, , AST 81, WBC 3.33, hemoglobin 8.8, hematocrit 26.1, platelets 31.  Alcohol level undetectable.  UDS positive for cocaine.  Urinalysis unremarkable.  His transferred to [facility] for Hematology Services.  Admitted to hospital medicine service.    REVIEW OF SYSTEMS:   Except as documented, all other systems reviewed and negative.    PAST MEDICAL HISTORY:     Past Medical History:   Diagnosis Date    Alcoholic cirrhosis     Esophageal varices     Hypertension        PAST SURGICAL HISTORY:     Past Surgical History:    Procedure Laterality Date    EGD, WITH HEMORRHAGE CONTROL  8/13/2022    Procedure: EGD,WITH HEMORRHAGE CONTROL;  Surgeon: Anna Rodriguez III, MD;  Location: [facility] OR;  Service: Gastroenterology;;    ESOPHAGOGASTRODUODENOSCOPY N/A 8/13/2022    Procedure: EGD (ESOPHAGOGASTRODUODENOSCOPY);  Surgeon: Anna Rodriguez III, MD;  Location: [facility] OR;  Service: Gastroenterology;  Laterality: N/A;    KNEE SURGERY Left        FAMILY HISTORY:   Reviewed and negative    SOCIAL HISTORY:   Denied tobacco use.  Heavy alcohol use.  Cocaine and marijuana use.    SCREENING FOR SOCIAL DRIVERS FOR HEALTH:   Patient screened for food insecurity, housing instability, transportation needs, utility difficulties, and interpersonal safety (select all that apply as identified as concern):  []Housing or Food  []Transportation Needs  []Utility Difficulties  []Interpersonal safety  [x]None    ALLERGIES:   Patient has no known allergies.    HOME MEDICATIONS:     Prior to Admission medications    Medication Sig Start Date End Date Taking? Authorizing Provider   ergocalciferol (ERGOCALCIFEROL) 50,000 unit Cap Take 50,000 Units by mouth every 7 days. 5/27/22   Provider, Historical   folic acid (FOLVITE) 1 MG tablet Take 1 tablet (1,000 mcg total) by mouth every morning. 1/10/22   Lula Burk MD   magnesium oxide (MAG-OX) 400 mg (241.3 mg magnesium) tablet Take 1 tablet by mouth 2 (two) times daily. 3/9/22   Provider, Historical   pantoprazole (PROTONIX) 40 MG tablet Take 1 tablet (40 mg total) by mouth 2 (two) times daily. 8/16/22 11/14/22  Liliana Moore MD   potassium chloride (K-TAB) 20 mEq Take 20 mEq by mouth once daily. 3/9/22   Provider, Historical   propranoloL (INDERAL) 20 MG tablet Take 20 mg by mouth 2 (two) times daily. 6/7/21  Yes Provider, Historical   thiamine (VITAMIN B-1) 50 MG tablet Take 50 mg by mouth once daily. 10/21/21   Provider, Historical   traMADoL (ULTRAM-ER) 100 MG Tb24 Take 100 mg by mouth once daily.    Provider,  Historical     ________________________________________________________________________  INPATIENT LIST OF MEDICATIONS   Current Medications[1]    Scheduled Meds:   folic acid  1 mg Oral Daily    multivitamin  1 tablet Oral Daily    mupirocin   Nasal BID    thiamine  100 mg Oral Daily     Continuous Infusions:  PRN Meds:.  Current Facility-Administered Medications:     acetaminophen, 1,000 mg, Oral, Q6H PRN    aluminum-magnesium hydroxide-simethicone, 30 mL, Oral, QID PRN    bisacodyL, 10 mg, Rectal, Daily PRN    chlordiazepoxide, 25 mg, Oral, QID PRN    dextrose 50%, 12.5 g, Intravenous, PRN    dextrose 50%, 25 g, Intravenous, PRN    glucagon (human recombinant), 1 mg, Intramuscular, PRN    glucose, 16 g, Oral, PRN    glucose, 24 g, Oral, PRN    melatonin, 6 mg, Oral, Nightly PRN    naloxone, 0.02 mg, Intravenous, PRN    ondansetron, 4 mg, Intravenous, Q4H PRN    prochlorperazine, 5 mg, Intravenous, Q6H PRN    senna-docusate, 1 tablet, Oral, BID PRN    sodium chloride 0.9%, 10 mL, Intravenous, PRN    PHYSICAL EXAM:     VITAL SIGNS: 24 HRS MIN & MAX LAST   Temp  Min: 97.9 °F (36.6 °C)  Max: 98.4 °F (36.9 °C) 97.9 °F (36.6 °C)   BP  Min: 117/70  Max: 164/87 136/77   Pulse  Min: 62  Max: 86  67   Resp  Min: 16  Max: 18 18   SpO2  Min: 97 %  Max: 100 % 98 %       General appearance: Well-developed male in no apparent distress.  HENT: Atraumatic head. Moist mucous membranes of oral cavity.  Eyes: Normal extraocular movements.   Neck: Supple.   Lungs: Clear to auscultation bilaterally.   Heart: Regular rate and rhythm. S1 and S2 present. No pedal edema.  Abdomen: Soft, non-distended, non-tender.  Extremities: No cyanosis, clubbing, or edema.  Skin: No Rash.   Neuro: Motor and sensory exams grossly intact.   Psych/mental status: Awake and alert. Appropriate mood and affect. Responds appropriately to questions.     LABS AND IMAGING:     Recent Labs   Lab 04/04/25  1722 04/05/25  0635   WBC 3.33* 2.27*   RBC 2.92* 2.63*    HGB 8.8* 8.0*   HCT 26.1* 23.1*   MCV 89.4 87.8   MCH 30.1 30.4   MCHC 33.7 34.6   RDW 18.4 18.8*   PLT 31* 23*       Recent Labs   Lab 04/04/25  1722 04/05/25  0634    138   K 3.8 3.2*    106   CO2 29 23   BUN 11 10.3   CREATININE 1.39* 0.91   CALCIUM 8.5 7.9*   MG  --  1.20*   ALBUMIN 3.3* 2.4*   ALKPHOS 167* 100   ALT 33 22   AST 81* 54*   BILITOT 4.2* 5.2*       Microbiology Results (last 7 days)       ** No results found for the last 168 hours. **             US Abdomen Complete  EXAMINATION  US ABDOMEN COMPLETE    CLINICAL HISTORY  Gastro-esophageal reflux disease without esophagitis; cholelithiasis; cirrhosis    TECHNIQUE  Multiplanar grayscale sonographic evaluation was performed, with focused Doppler assessment of the portal vein.    COMPARISON  20 February 2024    FINDINGS  Exam quality: Limited secondary to regional bowel gas and associated artifact.    Pancreas: Inadequately visualized secondary to bowel gas artifact.    Hepatobiliary: Liver size is within normal limits. There is similar diffuse nodular contour irregularity of the liver consistent with changes of cirrhosis.  No discrete mass-like abnormality. Unremarkable isoechoic appearance of the liver parenchyma, relative to the adjacent right kidney.  Normal hepatopetal flow visualized within the main portal vein and branch vessels.  No intra-/extrahepatic biliary dilatation suggestive of obstructing pathology.    Gallbladder: Multiple echogenic stones measuring up to 0.7 cm are again appreciated within the mildly distended gallbladder.  No wall thickening or pericholecystic fluid.  Sonographic Simon Sign is negative, per the ultrasonographer's report.    Kidneys: No suspicious mass lesion, shadowing stone, or hydronephrosis. The right kidney measures 12.4 cm in length, with the left kidney 11.5 cm.    Spleen: No abnormal contour, focal parenchymal abnormality, or significant enlargement.    Other findings: The visualized aorta and  IVC are unremarkable.  There are tortuous vascular structures at the upper abdomen again consistent with portosystemic varices.  No significant free fluid identified.    IMPRESSION  1. Similar changes of cirrhosis and appearance of portosystemic varices.  2. Redemonstrated cholelithiasis without sonographic evidence of acute cholecystitis or biliary obstruction.    Electronically signed by: Sharath Catherineens  Date:    02/20/2025  Time:    09:03        ASSESSMENT:   Acute thrombocytopenia   Pancytopenia   Alcoholic cirrhosis   Polysubstance abuse   DEMETRICE    History:  hypertension, alcoholism, gout, hemorrhoids, hyperlipidemia, polysubstance abuse, insomnia, PTSD, schizophrenia, vitamin-D deficiency, alcoholic cirrhosis, esophageal varices      PLAN:   CIWA protocol initiated  Daily multivitamin, folic acid and thiamine   Fall precautions/seizure precautions   Consider Hematology consultation  Resume home medications as deemed appropriate once medication reconciliation is updated  Labs in AM    VTE Prophylaxis: SCDs    Discharge Planning and Disposition: TBD    I, Chio Steele, NP have reviewed and discussed the case with Dr. Lee.   Please see the attending MD's addendum for further assessment and plan.    Chio Steele, [facility]  Department of Hospital Medicine   [facility]   04/05/2025    This note was created with the assistance of [employer] Voice Recognition Software. There may be transcription errors as a result of using this technology however minimal, and effort has been made to assure accuracy of transcription, but any obvious errors or omissions should be clarified with the author of the document.    _______________________________________________________________________________  MD Addendum:  I, Dr. Cummings  , assumed care of this patient today  For the patient encounter, I performed the substantive portion of the visit, I reviewed the NP/PA documentation, treatment plan,  and medical decision making.  I had face to face time with this patient     A. History:  Patient with cirrhosis and chronic thrombocytopenia   Now presented with right arm and left wrist bruising and forming knots   He is still drinking alcohol   Denies any bleeding gums, hematuria or rectal bleeding   No fever or chills     B. Physical exam:  Heart RRR  Lungs clear   Abdomen soft and non tender   Neuro: No FND    + right arm ecchymosis with firm swelling, Left wrist ecchymosis with mild tenderness     C. Medical decision making:  Patients labs and vitals reviewed  He has chronic thrombocytopenia  Now platelets 23K and he is having SQ bleeding and ecchymosis   INR 2.3, Hb 8  Will transfuse 1 unit of Platelet today   Check labs in am     Continue supportive care     Advised no alcohol use     All diagnosis and differential diagnosis have been reviewed; assessment and plan has been documented; I have personally reviewed the labs and test results that are presently available; I have reviewed the patients medication list; I have reviewed the consulting providers response and recommendations. I have reviewed or attempted to review medical records based upon their availability.    All of the patient and family questions have been addressed and answered. Patient's is agreeable to the above stated plan. I will continue to monitor closely and make adjustments to medical management as needed.      04/05/2025            [1]   Current Facility-Administered Medications:     acetaminophen tablet 1,000 mg, 1,000 mg, Oral, Q6H PRN, Antoinette Pereira FNP    aluminum-magnesium hydroxide-simethicone 200-200-20 mg/5 mL suspension 30 mL, 30 mL, Oral, QID PRN, Antoinette Pereira FNP    bisacodyL suppository 10 mg, 10 mg, Rectal, Daily PRN, Antoinette Pereira FNP    chlordiazepoxide capsule 25 mg, 25 mg, Oral, QID PRN, Antoinette Pereira FNP    dextrose 50% injection 12.5 g, 12.5 g, Intravenous, PRN, Antoinette Pereira FNP    dextrose  50% injection 25 g, 25 g, Intravenous, PRN, Antoinette Pereira, DINAP    folic acid tablet 1 mg, 1 mg, Oral, Daily, Antoinette Pereira FNP    glucagon (human recombinant) injection 1 mg, 1 mg, Intramuscular, PRN, Antoinette Pereira, FNP    glucose chewable tablet 16 g, 16 g, Oral, PRN, Antoinette Pereira, FNP    glucose chewable tablet 24 g, 24 g, Oral, PRN, Antoinette Pereira, DINAP    melatonin tablet 6 mg, 6 mg, Oral, Nightly PRN, Antoinette Pereira, FNP    multivitamin tablet, 1 tablet, Oral, Daily, Antoinette Pereira, JOCELYNN    mupirocin 2 % ointment, , Nasal, BID, Reyes, Thairy G, DO    naloxone 0.4 mg/mL injection 0.02 mg, 0.02 mg, Intravenous, PRN, Antoinette Pereira, JOCELYNN    ondansetron injection 4 mg, 4 mg, Intravenous, Q4H PRN, Antoinette Pereira, JOCELYNN    prochlorperazine injection Soln 5 mg, 5 mg, Intravenous, Q6H PRN, Antoinette Pereira, JOCELYNN    senna-docusate 8.6-50 mg per tablet 1 tablet, 1 tablet, Oral, BID PRN, Antoinette Pereira, JOCELYNN    sodium chloride 0.9% flush 10 mL, 10 mL, Intravenous, PRN, Antoinette Pereira, DINAP    thiamine tablet 100 mg, 100 mg, Oral, Daily, Antoinette Pereira, JOCELYNN

## 2025-04-05 NOTE — PLAN OF CARE
Problem: Adult Inpatient Plan of Care  Goal: Plan of Care Review  Outcome: Progressing  Goal: Patient-Specific Goal (Individualized)  Outcome: Progressing  Goal: Absence of Hospital-Acquired Illness or Injury  Outcome: Progressing  Goal: Optimal Comfort and Wellbeing  Outcome: Progressing  Goal: Readiness for Transition of Care  Outcome: Progressing     Problem: Fall Injury Risk  Goal: Absence of Fall and Fall-Related Injury  Outcome: Progressing     Problem: Anemia  Goal: Anemia Symptom Improvement  Outcome: Progressing

## 2025-04-05 NOTE — ED NOTES
REPORT GIVEN TO TYLER MYERS. PATIENT WHEELED TO ROOM 3 ON RA VIA WHEELCHAIR, PER CLAYTON, CNA. VS STABLE. PATIENT IN STABLE CONDITION AT THIS TIME.

## 2025-04-06 LAB
ALBUMIN SERPL-MCNC: 2.3 G/DL (ref 3.4–4.8)
ALBUMIN/GLOB SERPL: 0.6 RATIO (ref 1.1–2)
ALP SERPL-CCNC: 113 UNIT/L (ref 40–150)
ALT SERPL-CCNC: 19 UNIT/L (ref 0–55)
ANION GAP SERPL CALC-SCNC: 8 MEQ/L
AST SERPL-CCNC: 44 UNIT/L (ref 11–45)
BASOPHILS # BLD AUTO: 0.01 X10(3)/MCL
BASOPHILS NFR BLD AUTO: 0.4 %
BILIRUB SERPL-MCNC: 4.2 MG/DL
BUN SERPL-MCNC: 10.6 MG/DL (ref 8.4–25.7)
CALCIUM SERPL-MCNC: 8.1 MG/DL (ref 8.8–10)
CHLORIDE SERPL-SCNC: 108 MMOL/L (ref 98–107)
CO2 SERPL-SCNC: 23 MMOL/L (ref 23–31)
CREAT SERPL-MCNC: 0.9 MG/DL (ref 0.72–1.25)
CREAT/UREA NIT SERPL: 12
EOSINOPHIL # BLD AUTO: 0.09 X10(3)/MCL (ref 0–0.9)
EOSINOPHIL NFR BLD AUTO: 3.5 %
ERYTHROCYTE [DISTWIDTH] IN BLOOD BY AUTOMATED COUNT: 19.2 % (ref 11.5–17)
GFR SERPLBLD CREATININE-BSD FMLA CKD-EPI: >60 ML/MIN/1.73/M2
GLOBULIN SER-MCNC: 4.1 GM/DL (ref 2.4–3.5)
GLUCOSE SERPL-MCNC: 86 MG/DL (ref 82–115)
HCT VFR BLD AUTO: 23.7 % (ref 42–52)
HEMATOLOGIST REVIEW: NORMAL
HGB BLD-MCNC: 8.1 G/DL (ref 14–18)
IMM GRANULOCYTES # BLD AUTO: 0 X10(3)/MCL (ref 0–0.04)
IMM GRANULOCYTES NFR BLD AUTO: 0 %
LYMPHOCYTES # BLD AUTO: 0.63 X10(3)/MCL (ref 0.6–4.6)
LYMPHOCYTES NFR BLD AUTO: 24.6 %
MAGNESIUM SERPL-MCNC: 1.4 MG/DL (ref 1.6–2.6)
MCH RBC QN AUTO: 30.2 PG (ref 27–31)
MCHC RBC AUTO-ENTMCNC: 34.2 G/DL (ref 33–36)
MCV RBC AUTO: 88.4 FL (ref 80–94)
MONOCYTES # BLD AUTO: 0.46 X10(3)/MCL (ref 0.1–1.3)
MONOCYTES NFR BLD AUTO: 18 %
NEUTROPHILS # BLD AUTO: 1.37 X10(3)/MCL (ref 2.1–9.2)
NEUTROPHILS NFR BLD AUTO: 53.5 %
NRBC BLD AUTO-RTO: 0 %
PLATELET # BLD AUTO: 34 X10(3)/MCL (ref 130–400)
PLATELETS.RETICULATED NFR BLD AUTO: 9.4 % (ref 0.9–11.2)
PMV BLD AUTO: ABNORMAL FL
POTASSIUM SERPL-SCNC: 3.5 MMOL/L (ref 3.5–5.1)
PROT SERPL-MCNC: 6.4 GM/DL (ref 5.8–7.6)
RBC # BLD AUTO: 2.68 X10(6)/MCL (ref 4.7–6.1)
SODIUM SERPL-SCNC: 139 MMOL/L (ref 136–145)
WBC # BLD AUTO: 2.56 X10(3)/MCL (ref 4.5–11.5)

## 2025-04-06 PROCEDURE — 80053 COMPREHEN METABOLIC PANEL: CPT | Performed by: NURSE PRACTITIONER

## 2025-04-06 PROCEDURE — 85025 COMPLETE CBC W/AUTO DIFF WBC: CPT | Performed by: NURSE PRACTITIONER

## 2025-04-06 PROCEDURE — 25000003 PHARM REV CODE 250: Performed by: NURSE PRACTITIONER

## 2025-04-06 PROCEDURE — 25000003 PHARM REV CODE 250: Performed by: INTERNAL MEDICINE

## 2025-04-06 PROCEDURE — 36415 COLL VENOUS BLD VENIPUNCTURE: CPT | Performed by: NURSE PRACTITIONER

## 2025-04-06 PROCEDURE — 21400001 HC TELEMETRY ROOM

## 2025-04-06 PROCEDURE — 63600175 PHARM REV CODE 636 W HCPCS: Performed by: INTERNAL MEDICINE

## 2025-04-06 PROCEDURE — 83735 ASSAY OF MAGNESIUM: CPT | Performed by: NURSE PRACTITIONER

## 2025-04-06 RX ORDER — LANOLIN ALCOHOL/MO/W.PET/CERES
400 CREAM (GRAM) TOPICAL DAILY
Status: DISCONTINUED | OUTPATIENT
Start: 2025-04-06 | End: 2025-04-07

## 2025-04-06 RX ORDER — MAGNESIUM SULFATE HEPTAHYDRATE 40 MG/ML
2 INJECTION, SOLUTION INTRAVENOUS ONCE
Status: COMPLETED | OUTPATIENT
Start: 2025-04-06 | End: 2025-04-06

## 2025-04-06 RX ADMIN — FOLIC ACID 1 MG: 1 TABLET ORAL at 08:04

## 2025-04-06 RX ADMIN — MUPIROCIN: 20 OINTMENT TOPICAL at 10:04

## 2025-04-06 RX ADMIN — Medication 400 MG: at 10:04

## 2025-04-06 RX ADMIN — POTASSIUM CHLORIDE 40 MEQ: 1500 TABLET, EXTENDED RELEASE ORAL at 08:04

## 2025-04-06 RX ADMIN — MUPIROCIN: 20 OINTMENT TOPICAL at 08:04

## 2025-04-06 RX ADMIN — MAGNESIUM SULFATE HEPTAHYDRATE 2 G: 40 INJECTION, SOLUTION INTRAVENOUS at 10:04

## 2025-04-06 RX ADMIN — THIAMINE HCL TAB 100 MG 100 MG: 100 TAB at 08:04

## 2025-04-06 RX ADMIN — POTASSIUM CHLORIDE 40 MEQ: 1500 TABLET, EXTENDED RELEASE ORAL at 10:04

## 2025-04-06 RX ADMIN — THERA TABS 1 TABLET: TAB at 08:04

## 2025-04-06 NOTE — PROGRESS NOTES
[facility] Hospital Medicine Progress Note        Chief Complaint: Inpatient Follow-up for acute on chronic thrombocytopenia     HPI:     60 y.o. male with a PMHx of hypertension, alcoholism, gout, hemorrhoids, hyperlipidemia, polysubstance abuse, insomnia, PTSD, schizophrenia, vitamin-D deficiency, alcoholic cirrhosis, esophageal varices who presented to [facility] on 4/5/2025 via EMS as a transfer from [facility] ED for higher level of care.  He initially presented to the outlying ED at the direction of his PCP for evaluation of abnormal outpatient labs demonstrating low platelets.  Patient complained of new bruising to his arm and wrist.  He denied any abdominal pain, hemoptysis, hematemesis, melena, blood in stool.  He reports history of blood transfusion due to esophageal varices.  Last alcohol intake x1 day prior when he drank 1.5 glasses of wine.  Last cocaine use x1 week ago.       Initial labs notable for creatinine 1.39, total bili 4.2, , AST 81, WBC 3.33, hemoglobin 8.8, hematocrit 26.1, platelets 31.  Alcohol level undetectable.  UDS positive for cocaine.  Urinalysis unremarkable.  Admitted to hospital medicine service.    Interval Hx:   Patient today awake and comfortable. Denies any new bruising, hematuria, bleeding gums or rectal bleedings.   Has been afebrile.     Case was discussed with patient's nurse and  on the floor.    Objective/physical exam:  General: In no acute distress  Chest: Clear to auscultation bilaterally  Heart: RRR, +S1, S2, no appreciable murmur  Abdomen: Soft, nontender, BS +  MSK: Warm, no lower extremity edema, no clubbing or cyanosis  Neurologic: Cranial nerve II-XII intact, Strength 5/5 in all 4 extremities  + right arm ecchymosis with firm swelling, Left wrist ecchymosis with mild tenderness        VITAL SIGNS: 24 HRS MIN & MAX LAST   Temp  Min: 97.9 °F (36.6 °C)  Max: 98.3 °F (36.8 °C) 98 °F (36.7 °C)   BP  Min:  123/75  Max: 151/75 132/85   Pulse  Min: 67  Max: 76  70   Resp  Min: 18  Max: 18 18   SpO2  Min: 97 %  Max: 100 % 97 %     I have reviewed the following labs:  Recent Labs   Lab 04/04/25  1722 04/05/25  0635 04/06/25  0750   WBC 3.33* 2.27* 2.56*   RBC 2.92* 2.63* 2.68*   HGB 8.8* 8.0* 8.1*   HCT 26.1* 23.1* 23.7*   MCV 89.4 87.8 88.4   MCH 30.1 30.4 30.2   MCHC 33.7 34.6 34.2   RDW 18.4 18.8* 19.2*   PLT 31* 23* 34*     Recent Labs   Lab 04/04/25  1722 04/05/25  0634 04/06/25  0750    138 139   K 3.8 3.2* 3.5    106 108*   CO2 29 23 23   BUN 11 10.3 10.6   CREATININE 1.39* 0.91 0.90   CALCIUM 8.5 7.9* 8.1*   MG  --  1.20* 1.40*   ALBUMIN 3.3* 2.4* 2.3*   ALKPHOS 167* 100 113   ALT 33 22 19   AST 81* 54* 44   BILITOT 4.2* 5.2* 4.2*     Microbiology Results (last 7 days)       ** No results found for the last 168 hours. **             See below for Radiology    Assessment/Plan:  Acute on chronic thrombocytopenia from liver cirrhosis   Ecchymosis with small hematoma  Alcoholic cirrhosis   Hypomagnesemia   Polysubstance abuse   DEMETRICE: resolved   + Cocaine use      History:  hypertension, alcoholism, gout, hemorrhoids, hyperlipidemia, polysubstance abuse, insomnia, PTSD, schizophrenia, vitamin-D deficiency, alcoholic cirrhosis, esophageal varices      Plan:  Patient looks comfortable  No new bleeding seen   CBC today WBC 2.56, HB 8.1, Plt 34, Na 139, K 3.5, Crt 0.90, mag 1.40    Mag and potassium replaced     Continue supportive care     Advised to quit alcohol and cocaine use asap    Labs in am     VTE prophylaxis: SCD    Patient condition:  Fair    Anticipated discharge and Disposition:   Home in am if labs stable       All diagnosis and differential diagnosis have been reviewed; assessment and plan has been documented; I have personally reviewed the labs and test results that are presently available; I have reviewed the patients medication list; I have reviewed the consulting providers response and  recommendations. I have reviewed or attempted to review medical records based upon their availability    All of the patient's questions have been  addressed and answered. Patient's is agreeable to the above stated plan. I will continue to monitor closely and make adjustments to medical management as needed.    Portions of this note dictated using EMR integrated voice recognition software, and may be subject to voice recognition errors not corrected at proofreading. Please contact writer for clarification if needed.   _____________________________________________________________________    Malnutrition Status:  Nutrition consulted. Most recent weight and BMI monitored-     Measurements:  Wt Readings from Last 1 Encounters:   04/05/25 80.6 kg (177 lb 9.6 oz)   Body mass index is 24.77 kg/m².    Patient has been screened and assessed by RD.    Malnutrition Type:  Context:    Level:      Malnutrition Characteristic Summary:       Interventions/Recommendations (treatment strategy):        Scheduled Med:   folic acid  1 mg Oral Daily    magnesium oxide  400 mg Oral Daily    magnesium sulfate 2 g IVPB  2 g Intravenous Once    multivitamin  1 tablet Oral Daily    mupirocin   Nasal BID    potassium chloride  40 mEq Oral BID    thiamine  100 mg Oral Daily      Continuous Infusions:     PRN Meds:    Current Facility-Administered Medications:     0.9%  NaCl infusion (for blood administration), , Intravenous, Q24H PRN    acetaminophen, 1,000 mg, Oral, Q6H PRN    aluminum-magnesium hydroxide-simethicone, 30 mL, Oral, QID PRN    bisacodyL, 10 mg, Rectal, Daily PRN    chlordiazepoxide, 25 mg, Oral, QID PRN    dextrose 50%, 12.5 g, Intravenous, PRN    dextrose 50%, 25 g, Intravenous, PRN    glucagon (human recombinant), 1 mg, Intramuscular, PRN    glucose, 16 g, Oral, PRN    glucose, 24 g, Oral, PRN    melatonin, 6 mg, Oral, Nightly PRN    naloxone, 0.02 mg, Intravenous, PRN    ondansetron, 4 mg, Intravenous, Q4H PRN     prochlorperazine, 5 mg, Intravenous, Q6H PRN    senna-docusate, 1 tablet, Oral, BID PRN    sodium chloride 0.9%, 10 mL, Intravenous, PRN     Radiology:  I have personally reviewed the following imaging and agree with the radiologist.     US Abdomen Complete  EXAMINATION  US ABDOMEN COMPLETE    CLINICAL HISTORY  Gastro-esophageal reflux disease without esophagitis; cholelithiasis; cirrhosis    TECHNIQUE  Multiplanar grayscale sonographic evaluation was performed, with focused Doppler assessment of the portal vein.    COMPARISON  20 February 2024    FINDINGS  Exam quality: Limited secondary to regional bowel gas and associated artifact.    Pancreas: Inadequately visualized secondary to bowel gas artifact.    Hepatobiliary: Liver size is within normal limits. There is similar diffuse nodular contour irregularity of the liver consistent with changes of cirrhosis.  No discrete mass-like abnormality. Unremarkable isoechoic appearance of the liver parenchyma, relative to the adjacent right kidney.  Normal hepatopetal flow visualized within the main portal vein and branch vessels.  No intra-/extrahepatic biliary dilatation suggestive of obstructing pathology.    Gallbladder: Multiple echogenic stones measuring up to 0.7 cm are again appreciated within the mildly distended gallbladder.  No wall thickening or pericholecystic fluid.  Sonographic Simon Sign is negative, per the ultrasonographer's report.    Kidneys: No suspicious mass lesion, shadowing stone, or hydronephrosis. The right kidney measures 12.4 cm in length, with the left kidney 11.5 cm.    Spleen: No abnormal contour, focal parenchymal abnormality, or significant enlargement.    Other findings: The visualized aorta and IVC are unremarkable.  There are tortuous vascular structures at the upper abdomen again consistent with portosystemic varices.  No significant free fluid identified.    IMPRESSION  1. Similar changes of cirrhosis and appearance of portosystemic  varices.  2. Redemonstrated cholelithiasis without sonographic evidence of acute cholecystitis or biliary obstruction.    Electronically signed by: Sharath Alarcon  Date:    02/20/2025  Time:    09:03      Jim Lee MD  Department of Hospital Medicine   [facility]   04/06/2025

## 2025-04-07 VITALS
HEART RATE: 77 BPM | RESPIRATION RATE: 18 BRPM | OXYGEN SATURATION: 100 % | TEMPERATURE: 98 F | SYSTOLIC BLOOD PRESSURE: 147 MMHG | HEIGHT: 71 IN | BODY MASS INDEX: 24.87 KG/M2 | DIASTOLIC BLOOD PRESSURE: 83 MMHG | WEIGHT: 177.63 LBS

## 2025-04-07 PROBLEM — D69.6 THROMBOCYTOPENIA: Status: ACTIVE | Noted: 2025-04-07

## 2025-04-07 LAB
ANION GAP SERPL CALC-SCNC: 4 MEQ/L
BASOPHILS # BLD AUTO: 0.03 X10(3)/MCL
BASOPHILS NFR BLD AUTO: 1 %
BUN SERPL-MCNC: 10.6 MG/DL (ref 8.4–25.7)
CALCIUM SERPL-MCNC: 8.1 MG/DL (ref 8.8–10)
CHLORIDE SERPL-SCNC: 108 MMOL/L (ref 98–107)
CO2 SERPL-SCNC: 24 MMOL/L (ref 23–31)
CREAT SERPL-MCNC: 0.9 MG/DL (ref 0.72–1.25)
CREAT/UREA NIT SERPL: 12
EOSINOPHIL # BLD AUTO: 0.14 X10(3)/MCL (ref 0–0.9)
EOSINOPHIL NFR BLD AUTO: 4.6 %
ERYTHROCYTE [DISTWIDTH] IN BLOOD BY AUTOMATED COUNT: 19.5 % (ref 11.5–17)
GFR SERPLBLD CREATININE-BSD FMLA CKD-EPI: >60 ML/MIN/1.73/M2
GLUCOSE SERPL-MCNC: 85 MG/DL (ref 82–115)
HCT VFR BLD AUTO: 24.3 % (ref 42–52)
HGB BLD-MCNC: 8.4 G/DL (ref 14–18)
IMM GRANULOCYTES # BLD AUTO: 0.01 X10(3)/MCL (ref 0–0.04)
IMM GRANULOCYTES NFR BLD AUTO: 0.3 %
LYMPHOCYTES # BLD AUTO: 0.71 X10(3)/MCL (ref 0.6–4.6)
LYMPHOCYTES NFR BLD AUTO: 23.3 %
MAGNESIUM SERPL-MCNC: 1.5 MG/DL (ref 1.6–2.6)
MCH RBC QN AUTO: 30.9 PG (ref 27–31)
MCHC RBC AUTO-ENTMCNC: 34.6 G/DL (ref 33–36)
MCV RBC AUTO: 89.3 FL (ref 80–94)
MONOCYTES # BLD AUTO: 0.5 X10(3)/MCL (ref 0.1–1.3)
MONOCYTES NFR BLD AUTO: 16.4 %
NEUTROPHILS # BLD AUTO: 1.66 X10(3)/MCL (ref 2.1–9.2)
NEUTROPHILS NFR BLD AUTO: 54.4 %
NRBC BLD AUTO-RTO: 0 %
PLATELET # BLD AUTO: 34 X10(3)/MCL (ref 130–400)
PLATELETS.RETICULATED NFR BLD AUTO: 12 % (ref 0.9–11.2)
PMV BLD AUTO: ABNORMAL FL
POTASSIUM SERPL-SCNC: 3.7 MMOL/L (ref 3.5–5.1)
RBC # BLD AUTO: 2.72 X10(6)/MCL (ref 4.7–6.1)
SODIUM SERPL-SCNC: 136 MMOL/L (ref 136–145)
WBC # BLD AUTO: 3.05 X10(3)/MCL (ref 4.5–11.5)

## 2025-04-07 PROCEDURE — 83735 ASSAY OF MAGNESIUM: CPT | Performed by: INTERNAL MEDICINE

## 2025-04-07 PROCEDURE — 85025 COMPLETE CBC W/AUTO DIFF WBC: CPT | Performed by: INTERNAL MEDICINE

## 2025-04-07 PROCEDURE — 63600175 PHARM REV CODE 636 W HCPCS: Performed by: INTERNAL MEDICINE

## 2025-04-07 PROCEDURE — 80048 BASIC METABOLIC PNL TOTAL CA: CPT | Performed by: INTERNAL MEDICINE

## 2025-04-07 PROCEDURE — 86023 IMMUNOGLOBULIN ASSAY: CPT | Performed by: NURSE PRACTITIONER

## 2025-04-07 PROCEDURE — 25000003 PHARM REV CODE 250: Performed by: NURSE PRACTITIONER

## 2025-04-07 PROCEDURE — 36415 COLL VENOUS BLD VENIPUNCTURE: CPT | Performed by: INTERNAL MEDICINE

## 2025-04-07 PROCEDURE — 30233N1 TRANSFUSION OF NONAUTOLOGOUS RED BLOOD CELLS INTO PERIPHERAL VEIN, PERCUTANEOUS APPROACH: ICD-10-PCS | Performed by: INTERNAL MEDICINE

## 2025-04-07 PROCEDURE — 25000003 PHARM REV CODE 250: Performed by: INTERNAL MEDICINE

## 2025-04-07 RX ORDER — MAGNESIUM SULFATE HEPTAHYDRATE 40 MG/ML
2 INJECTION, SOLUTION INTRAVENOUS ONCE
Status: DISCONTINUED | OUTPATIENT
Start: 2025-04-07 | End: 2025-04-07 | Stop reason: HOSPADM

## 2025-04-07 RX ORDER — LANOLIN ALCOHOL/MO/W.PET/CERES
400 CREAM (GRAM) TOPICAL 2 TIMES DAILY
Status: DISCONTINUED | OUTPATIENT
Start: 2025-04-07 | End: 2025-04-07 | Stop reason: HOSPADM

## 2025-04-07 RX ADMIN — THIAMINE HCL TAB 100 MG 100 MG: 100 TAB at 09:04

## 2025-04-07 RX ADMIN — FOLIC ACID 1 MG: 1 TABLET ORAL at 09:04

## 2025-04-07 RX ADMIN — Medication 400 MG: at 09:04

## 2025-04-07 RX ADMIN — THERA TABS 1 TABLET: TAB at 09:04

## 2025-04-07 RX ADMIN — MUPIROCIN: 20 OINTMENT TOPICAL at 09:04

## 2025-04-07 RX ADMIN — POTASSIUM CHLORIDE 40 MEQ: 1500 TABLET, EXTENDED RELEASE ORAL at 09:04

## 2025-04-07 NOTE — DISCHARGE SUMMARY
[facility]  Hospital Medicine Discharge Summary    Admit Date: 4/5/2025  Discharge Date and Time: 4/7/202512:05 PM  Admitting Physician: JOSEY Team  Discharging Physician: Jim Lee MD.  Primary Care Physician: Karey, Primary Doctor    Discharge Diagnoses:  Acute on chronic thrombocytopenia from liver cirrhosis   Ecchymosis with small hematoma  Alcoholic cirrhosis   Hypomagnesemia   Polysubstance abuse   DEMETRICE: resolved   + Cocaine use      History:  hypertension, alcoholism, gout, hemorrhoids, hyperlipidemia, polysubstance abuse, insomnia, PTSD, schizophrenia, vitamin-D deficiency, alcoholic cirrhosis, esophageal varices       Hospital Course:   60 y.o. male with a PMHx of hypertension, alcoholism, gout, hemorrhoids, hyperlipidemia, polysubstance abuse, insomnia, PTSD, schizophrenia, vitamin-D deficiency, alcoholic cirrhosis, esophageal varices who presented to [facility] on 4/5/2025 via EMS as a transfer from [facility] ED for higher level of care.  [facility] for evaluation of abnormal outpatient labs demonstrating low platelets.  Patient complained of new bruising to his arm and wrist.  He denied any abdominal pain, hemoptysis, hematemesis, melena, blood in stool.  He reports history of blood transfusion due to esophageal varices.  Last alcohol intake x1 day prior when he drank 1.5 glasses of wine.  Last cocaine use x1 week ago.       Initial labs notable for creatinine 1.39, total bili 4.2, , AST 81, WBC 3.33, hemoglobin 8.8, hematocrit 26.1, platelets 31.  Alcohol level undetectable.  UDS positive for cocaine.  Urinalysis unremarkable.  Admitted to hospital medicine service.    Rpt labs done and his platelets dropped to 23. Since he was having bruising and small clots he was transfused 1 unit of Platelets. He has chronic thrombocytopenia from cirrhosis. He was closely monitored. He had no new bruising or  bleeding. Denied any new bruising, hematuria, bleeding gums or rectal bleedings. His Platelets stabilized. He was discharged home in a stable condition. He has no PCP, we set up with a PCP and advised CBC on F/U.     Advised patient to quit alcohol and cocaine use. Patient states he moves between [place], [place] and [place].     Pt was seen and examined on the day of discharge  Vitals:  VITAL SIGNS: 24 HRS MIN & MAX LAST   Temp  Min: 97.8 °F (36.6 °C)  Max: 99.8 °F (37.7 °C) 98 °F (36.7 °C)   BP  Min: 127/75  Max: 158/81 (!) 147/83   Pulse  Min: 67  Max: 77  77   Resp  Min: 18  Max: 20 18   SpO2  Min: 98 %  Max: 100 % 100 %       Physical Exam:  Heart RRR  Lungs clear   Abdomen soft and non tender   Neuro: No FND      Procedures Performed: No admission procedures for hospital encounter.     Significant Diagnostic Studies: See Full reports for all details    Recent Labs   Lab 04/05/25  0635 04/06/25  0750 04/07/25  0333   WBC 2.27* 2.56* 3.05*   RBC 2.63* 2.68* 2.72*   HGB 8.0* 8.1* 8.4*   HCT 23.1* 23.7* 24.3*   MCV 87.8 88.4 89.3   MCH 30.4 30.2 30.9   MCHC 34.6 34.2 34.6   RDW 18.8* 19.2* 19.5*   PLT 23* 34* 34*       Recent Labs   Lab 04/04/25  1722 04/05/25  0634 04/06/25  0750 04/07/25  0333    138 139 136   K 3.8 3.2* 3.5 3.7    106 108* 108*   CO2 29 23 23 24   BUN 11 10.3 10.6 10.6   CREATININE 1.39* 0.91 0.90 0.90   CALCIUM 8.5 7.9* 8.1* 8.1*   MG  --  1.20* 1.40* 1.50*   ALBUMIN 3.3* 2.4* 2.3*  --    ALKPHOS 167* 100 113  --    ALT 33 22 19  --    AST 81* 54* 44  --    BILITOT 4.2* 5.2* 4.2*  --         Microbiology Results (last 7 days)       ** No results found for the last 168 hours. **             US Abdomen Complete  EXAMINATION  US ABDOMEN COMPLETE    CLINICAL HISTORY  Gastro-esophageal reflux disease without esophagitis; cholelithiasis; cirrhosis    TECHNIQUE  Multiplanar grayscale sonographic evaluation was performed, with focused Doppler assessment of the portal  vein.    COMPARISON  20 February 2024    FINDINGS  Exam quality: Limited secondary to regional bowel gas and associated artifact.    Pancreas: Inadequately visualized secondary to bowel gas artifact.    Hepatobiliary: Liver size is within normal limits. There is similar diffuse nodular contour irregularity of the liver consistent with changes of cirrhosis.  No discrete mass-like abnormality. Unremarkable isoechoic appearance of the liver parenchyma, relative to the adjacent right kidney.  Normal hepatopetal flow visualized within the main portal vein and branch vessels.  No intra-/extrahepatic biliary dilatation suggestive of obstructing pathology.    Gallbladder: Multiple echogenic stones measuring up to 0.7 cm are again appreciated within the mildly distended gallbladder.  No wall thickening or pericholecystic fluid.  Sonographic Simon Sign is negative, per the ultrasonographer's report.    Kidneys: No suspicious mass lesion, shadowing stone, or hydronephrosis. The right kidney measures 12.4 cm in length, with the left kidney 11.5 cm.    Spleen: No abnormal contour, focal parenchymal abnormality, or significant enlargement.    Other findings: The visualized aorta and IVC are unremarkable.  There are tortuous vascular structures at the upper abdomen again consistent with portosystemic varices.  No significant free fluid identified.    IMPRESSION  1. Similar changes of cirrhosis and appearance of portosystemic varices.  2. Redemonstrated cholelithiasis without sonographic evidence of acute cholecystitis or biliary obstruction.    Electronically signed by: Sharath Alarcon  Date:    02/20/2025  Time:    09:03         Medication List        CONTINUE taking these medications      ergocalciferol 50,000 unit Cap  Commonly known as: ERGOCALCIFEROL     folic acid 1 MG tablet  Commonly known as: FOLVITE  Take 1 tablet (1,000 mcg total) by mouth every morning.     magnesium oxide 400 mg (241.3 mg magnesium) tablet  Commonly  known as: MAG-OX     pantoprazole 40 MG tablet  Commonly known as: PROTONIX  Take 1 tablet (40 mg total) by mouth 2 (two) times daily.     potassium chloride 20 mEq  Commonly known as: K-TAB     propranoloL 20 MG tablet  Commonly known as: INDERAL     thiamine 50 MG tablet  Commonly known as: VITAMIN B-1     traMADoL 100 MG Tb24  Commonly known as: ULTRAM-ER               Explained in detail to the patient about the discharge plan, medications, and follow-up visits. Pt understands and agrees with the treatment plan  Discharge Disposition: Home   Discharged Condition: stable  Diet-   Dietary Orders (From admission, onward)       Start     Ordered    04/05/25 0214  Diet Adult Regular Standard Tray  Diet effective now        Question:  Tray type:  Answer:  Standard Tray    04/05/25 0213                   Medications Per DC med rec  Activities as tolerated   Follow-up Information       No, Primary Doctor Follow up.    Why: Please call the number you were gave to get a primary care Doctor                         For further questions contact hospitalist office    Discharge time 33 minutes    For worsening symptoms, chest pain, shortness of breath, increased abdominal pain, high grade fever, stroke or stroke like symptoms, immediately go to the nearest Emergency Room or call 911 as soon as possible.      Jim Nuno M.D on 4/7/2025. at 12:05 PM.

## 2025-04-07 NOTE — PLAN OF CARE
Problem: Adult Inpatient Plan of Care  Goal: Plan of Care Review  Outcome: Met  Goal: Patient-Specific Goal (Individualized)  Outcome: Met  Goal: Absence of Hospital-Acquired Illness or Injury  Outcome: Met  Goal: Optimal Comfort and Wellbeing  Outcome: Met  Goal: Readiness for Transition of Care  Outcome: Met     Problem: Fall Injury Risk  Goal: Absence of Fall and Fall-Related Injury  Outcome: Met     Problem: Anemia  Goal: Anemia Symptom Improvement  Outcome: Met

## 2025-04-08 LAB
PA IGG BLD QL FC: NEGATIVE
PA IGM BLD QL FC: ABNORMAL

## 2025-06-16 ENCOUNTER — HOSPITAL ENCOUNTER (INPATIENT)
Facility: HOSPITAL | Age: 60
LOS: 17 days | Discharge: HOME HEALTH | DRG: 441 | End: 2025-07-03
Admitting: FAMILY MEDICINE
Payer: OTHER GOVERNMENT

## 2025-06-16 DIAGNOSIS — G31.2 ALCOHOLIC ENCEPHALOPATHY: Primary | ICD-10-CM

## 2025-06-16 DIAGNOSIS — D69.6 THROMBOCYTOPENIA: ICD-10-CM

## 2025-06-16 DIAGNOSIS — R07.9 CHEST PAIN: ICD-10-CM

## 2025-06-16 DIAGNOSIS — D64.9 ANEMIA, UNSPECIFIED TYPE: ICD-10-CM

## 2025-06-16 DIAGNOSIS — I10 HYPERTENSION, UNSPECIFIED TYPE: ICD-10-CM

## 2025-06-16 DIAGNOSIS — T50.904A OVERDOSE OF UNDETERMINED INTENT, INITIAL ENCOUNTER: ICD-10-CM

## 2025-06-16 DIAGNOSIS — R41.82 AMS (ALTERED MENTAL STATUS): ICD-10-CM

## 2025-06-16 LAB
ALBUMIN SERPL-MCNC: 3.3 G/DL (ref 3.4–5)
ALBUMIN/GLOB SERPL: 0.8 RATIO
ALP SERPL-CCNC: 233 UNIT/L (ref 50–144)
ALT SERPL-CCNC: 30 UNIT/L (ref 1–45)
AMMONIA PLAS-MSCNC: 165 UMOL/L (ref 11–32)
AMPHET UR QL SCN: NEGATIVE
ANION GAP SERPL CALC-SCNC: 3 MEQ/L (ref 2–13)
APAP SERPL-MCNC: <10 UG/ML (ref 10–30)
AST SERPL-CCNC: 69 UNIT/L (ref 17–59)
BARBITURATE SCN PRESENT UR: NEGATIVE
BASOPHILS # BLD AUTO: 0.06 X10(3)/MCL (ref 0.01–0.08)
BASOPHILS NFR BLD AUTO: 1.2 % (ref 0.1–1.2)
BENZODIAZ UR QL SCN: NEGATIVE
BILIRUB SERPL-MCNC: 3.5 MG/DL (ref 0–1)
BILIRUB UR QL STRIP.AUTO: NEGATIVE
BNP BLD-MCNC: 43.8 PG/ML (ref 0–124.9)
BUN SERPL-MCNC: 11 MG/DL (ref 7–20)
CALCIUM SERPL-MCNC: 8.9 MG/DL (ref 8.4–10.2)
CANNABINOIDS UR QL SCN: NEGATIVE
CHLORIDE SERPL-SCNC: 116 MMOL/L (ref 98–110)
CLARITY UR: CLEAR
CO2 SERPL-SCNC: 22 MMOL/L (ref 21–32)
COCAINE UR QL SCN: NEGATIVE
COLOR UR AUTO: YELLOW
CREAT SERPL-MCNC: 1.08 MG/DL (ref 0.66–1.25)
CREAT/UREA NIT SERPL: 10 (ref 12–20)
D DIMER PPP IA.FEU-MCNC: 0.85 MG/L FEU (ref 0.19–0.5)
EOSINOPHIL # BLD AUTO: 0.24 X10(3)/MCL (ref 0.04–0.54)
EOSINOPHIL NFR BLD AUTO: 4.7 % (ref 0.7–7)
ERYTHROCYTE [DISTWIDTH] IN BLOOD BY AUTOMATED COUNT: 18 %
ETHANOL BLD-MCNC: <0.01 G/DL
ETHANOL SERPL-MCNC: <10 MG/DL
FOLATE SERPL-MCNC: 6 NG/ML (ref 2.8–20)
GFR SERPLBLD CREATININE-BSD FMLA CKD-EPI: 79 ML/MIN/1.73/M2
GLOBULIN SER-MCNC: 4.4 GM/DL (ref 2–3.9)
GLUCOSE SERPL-MCNC: 117 MG/DL (ref 70–115)
GLUCOSE UR QL STRIP: NEGATIVE
HCT VFR BLD AUTO: 27.6 % (ref 36–52)
HGB BLD-MCNC: 9.3 G/DL (ref 13–18)
HGB UR QL STRIP: NEGATIVE
IMM GRANULOCYTES # BLD AUTO: 0.02 X10(3)/MCL (ref 0–0.03)
IMM GRANULOCYTES NFR BLD AUTO: 0.4 % (ref 0–0.5)
KETONES UR QL STRIP: NEGATIVE
LEUKOCYTE ESTERASE UR QL STRIP: NEGATIVE
LYMPHOCYTES # BLD AUTO: 1.13 X10(3)/MCL (ref 1.32–3.57)
LYMPHOCYTES NFR BLD AUTO: 22.2 % (ref 20–55)
MAGNESIUM SERPL-MCNC: 1.9 MG/DL (ref 1.8–2.4)
MCH RBC QN AUTO: 30.5 PG (ref 27–34)
MCHC RBC AUTO-ENTMCNC: 33.7 G/DL (ref 31–37)
MCV RBC AUTO: 90.5 FL (ref 79–99)
METHADONE UR QL SCN: NEGATIVE
MONOCYTES # BLD AUTO: 0.73 X10(3)/MCL (ref 0.3–0.82)
MONOCYTES NFR BLD AUTO: 14.3 % (ref 4.7–12.5)
NEUTROPHILS # BLD AUTO: 2.92 X10(3)/MCL (ref 1.78–5.38)
NEUTROPHILS NFR BLD AUTO: 57.2 % (ref 37–73)
NITRITE UR QL STRIP: NEGATIVE
NRBC BLD AUTO-RTO: 0 %
OPIATES UR QL SCN: NEGATIVE
PCP UR QL: NEGATIVE
PH UR STRIP: 7.5 [PH]
PH UR: 7.5 [PH] (ref 5–8)
PLATELET # BLD AUTO: 75 X10(3)/MCL (ref 140–371)
PLATELET # BLD EST: ABNORMAL 10*3/UL
PMV BLD AUTO: ABNORMAL FL
POTASSIUM SERPL-SCNC: 3.9 MMOL/L (ref 3.5–5.1)
PROT SERPL-MCNC: 7.7 GM/DL (ref 6.3–8.2)
PROT UR QL STRIP: NEGATIVE
RBC # BLD AUTO: 3.05 X10(6)/MCL (ref 4–6)
SALICYLATES SERPL-MCNC: <1 MG/DL
SODIUM SERPL-SCNC: 141 MMOL/L (ref 136–145)
SP GR UR STRIP.AUTO: 1.02 (ref 1–1.03)
TROPONIN I SERPL-MCNC: <0.012 NG/ML (ref 0–0.03)
UROBILINOGEN UR STRIP-ACNC: 2
VIT B12 SERPL-MCNC: >1000 PG/ML (ref 211–946)
WBC # BLD AUTO: 5.1 X10(3)/MCL (ref 4–11.5)

## 2025-06-16 PROCEDURE — 80179 DRUG ASSAY SALICYLATE: CPT

## 2025-06-16 PROCEDURE — 96375 TX/PRO/DX INJ NEW DRUG ADDON: CPT

## 2025-06-16 PROCEDURE — 82077 ASSAY SPEC XCP UR&BREATH IA: CPT

## 2025-06-16 PROCEDURE — 80074 ACUTE HEPATITIS PANEL: CPT | Performed by: HOSPITALIST

## 2025-06-16 PROCEDURE — 25000003 PHARM REV CODE 250

## 2025-06-16 PROCEDURE — 82607 VITAMIN B-12: CPT | Performed by: HOSPITALIST

## 2025-06-16 PROCEDURE — 80143 DRUG ASSAY ACETAMINOPHEN: CPT

## 2025-06-16 PROCEDURE — 63600175 PHARM REV CODE 636 W HCPCS: Performed by: HOSPITALIST

## 2025-06-16 PROCEDURE — 85025 COMPLETE CBC W/AUTO DIFF WBC: CPT

## 2025-06-16 PROCEDURE — 83735 ASSAY OF MAGNESIUM: CPT

## 2025-06-16 PROCEDURE — 80307 DRUG TEST PRSMV CHEM ANLYZR: CPT

## 2025-06-16 PROCEDURE — 84484 ASSAY OF TROPONIN QUANT: CPT

## 2025-06-16 PROCEDURE — 83880 ASSAY OF NATRIURETIC PEPTIDE: CPT

## 2025-06-16 PROCEDURE — 63600175 PHARM REV CODE 636 W HCPCS

## 2025-06-16 PROCEDURE — 85379 FIBRIN DEGRADATION QUANT: CPT

## 2025-06-16 PROCEDURE — 25000003 PHARM REV CODE 250: Performed by: HOSPITALIST

## 2025-06-16 PROCEDURE — 83540 ASSAY OF IRON: CPT | Performed by: FAMILY MEDICINE

## 2025-06-16 PROCEDURE — 82140 ASSAY OF AMMONIA: CPT

## 2025-06-16 PROCEDURE — 81003 URINALYSIS AUTO W/O SCOPE: CPT

## 2025-06-16 PROCEDURE — 93010 ELECTROCARDIOGRAM REPORT: CPT | Mod: ,,, | Performed by: INTERNAL MEDICINE

## 2025-06-16 PROCEDURE — 25500020 PHARM REV CODE 255

## 2025-06-16 PROCEDURE — 82746 ASSAY OF FOLIC ACID SERUM: CPT | Performed by: HOSPITALIST

## 2025-06-16 PROCEDURE — 93005 ELECTROCARDIOGRAM TRACING: CPT

## 2025-06-16 PROCEDURE — 80053 COMPREHEN METABOLIC PANEL: CPT

## 2025-06-16 PROCEDURE — 20000000 HC ICU ROOM

## 2025-06-16 PROCEDURE — 82306 VITAMIN D 25 HYDROXY: CPT | Performed by: HOSPITALIST

## 2025-06-16 PROCEDURE — 96374 THER/PROPH/DIAG INJ IV PUSH: CPT

## 2025-06-16 PROCEDURE — 99285 EMERGENCY DEPT VISIT HI MDM: CPT | Mod: 25

## 2025-06-16 RX ORDER — GLUCAGON 1 MG
1 KIT INJECTION
Status: DISCONTINUED | OUTPATIENT
Start: 2025-06-16 | End: 2025-07-03 | Stop reason: HOSPADM

## 2025-06-16 RX ORDER — THIAMINE HYDROCHLORIDE 100 MG/ML
100 INJECTION, SOLUTION INTRAMUSCULAR; INTRAVENOUS DAILY
Status: DISCONTINUED | OUTPATIENT
Start: 2025-06-17 | End: 2025-06-18

## 2025-06-16 RX ORDER — PROPRANOLOL HYDROCHLORIDE 20 MG/1
20 TABLET ORAL 2 TIMES DAILY
Status: DISCONTINUED | OUTPATIENT
Start: 2025-06-16 | End: 2025-07-03 | Stop reason: HOSPADM

## 2025-06-16 RX ORDER — LORAZEPAM 2 MG/ML
2 INJECTION INTRAMUSCULAR
Status: COMPLETED | OUTPATIENT
Start: 2025-06-16 | End: 2025-06-16

## 2025-06-16 RX ORDER — METOPROLOL TARTRATE 1 MG/ML
5 INJECTION, SOLUTION INTRAVENOUS
Status: COMPLETED | OUTPATIENT
Start: 2025-06-16 | End: 2025-06-16

## 2025-06-16 RX ORDER — SODIUM CHLORIDE 9 MG/ML
1000 INJECTION, SOLUTION INTRAVENOUS
Status: ACTIVE | OUTPATIENT
Start: 2025-06-16 | End: 2025-06-17

## 2025-06-16 RX ORDER — HYDRALAZINE HYDROCHLORIDE 20 MG/ML
10 INJECTION INTRAMUSCULAR; INTRAVENOUS EVERY 6 HOURS PRN
Status: DISCONTINUED | OUTPATIENT
Start: 2025-06-16 | End: 2025-07-03 | Stop reason: HOSPADM

## 2025-06-16 RX ORDER — MIDAZOLAM HYDROCHLORIDE 2 MG/2ML
2 INJECTION, SOLUTION INTRAMUSCULAR; INTRAVENOUS
Status: COMPLETED | OUTPATIENT
Start: 2025-06-16 | End: 2025-06-16

## 2025-06-16 RX ORDER — IBUPROFEN 200 MG
24 TABLET ORAL
Status: DISCONTINUED | OUTPATIENT
Start: 2025-06-16 | End: 2025-07-03 | Stop reason: HOSPADM

## 2025-06-16 RX ORDER — MUPIROCIN 20 MG/G
OINTMENT TOPICAL 2 TIMES DAILY
Status: DISPENSED | OUTPATIENT
Start: 2025-06-16 | End: 2025-06-21

## 2025-06-16 RX ORDER — SODIUM CHLORIDE 0.9 % (FLUSH) 0.9 %
10 SYRINGE (ML) INJECTION EVERY 12 HOURS PRN
Status: DISCONTINUED | OUTPATIENT
Start: 2025-06-16 | End: 2025-07-03 | Stop reason: HOSPADM

## 2025-06-16 RX ORDER — SILDENAFIL 100 MG/1
100 TABLET, FILM COATED ORAL DAILY PRN
COMMUNITY

## 2025-06-16 RX ORDER — PROPRANOLOL HYDROCHLORIDE 20 MG/1
20 TABLET ORAL 2 TIMES DAILY
COMMUNITY

## 2025-06-16 RX ORDER — SODIUM CHLORIDE 9 MG/ML
INJECTION, SOLUTION INTRAVENOUS CONTINUOUS
Status: DISCONTINUED | OUTPATIENT
Start: 2025-06-16 | End: 2025-06-18

## 2025-06-16 RX ORDER — ONDANSETRON HYDROCHLORIDE 2 MG/ML
4 INJECTION, SOLUTION INTRAVENOUS EVERY 8 HOURS PRN
Status: DISCONTINUED | OUTPATIENT
Start: 2025-06-16 | End: 2025-07-03 | Stop reason: HOSPADM

## 2025-06-16 RX ORDER — HYDRALAZINE HYDROCHLORIDE 20 MG/ML
10 INJECTION INTRAMUSCULAR; INTRAVENOUS
Status: COMPLETED | OUTPATIENT
Start: 2025-06-16 | End: 2025-06-16

## 2025-06-16 RX ORDER — LABETALOL HCL 20 MG/4 ML
20 SYRINGE (ML) INTRAVENOUS EVERY 4 HOURS PRN
Status: DISCONTINUED | OUTPATIENT
Start: 2025-06-16 | End: 2025-07-03 | Stop reason: HOSPADM

## 2025-06-16 RX ORDER — SODIUM CHLORIDE 0.9 % (FLUSH) 0.9 %
10 SYRINGE (ML) INJECTION
Status: DISCONTINUED | OUTPATIENT
Start: 2025-06-16 | End: 2025-07-03 | Stop reason: HOSPADM

## 2025-06-16 RX ORDER — FAMOTIDINE 10 MG/ML
20 INJECTION, SOLUTION INTRAVENOUS EVERY 12 HOURS
Status: DISCONTINUED | OUTPATIENT
Start: 2025-06-16 | End: 2025-06-25

## 2025-06-16 RX ORDER — NALOXONE HCL 0.4 MG/ML
0.02 VIAL (ML) INJECTION
Status: DISCONTINUED | OUTPATIENT
Start: 2025-06-16 | End: 2025-07-03 | Stop reason: HOSPADM

## 2025-06-16 RX ORDER — LACTULOSE 10 G/15ML
200 SOLUTION ORAL; RECTAL 3 TIMES DAILY
Status: DISCONTINUED | OUTPATIENT
Start: 2025-06-16 | End: 2025-06-17

## 2025-06-16 RX ORDER — BACLOFEN 20 MG/1
20 TABLET ORAL 3 TIMES DAILY PRN
COMMUNITY

## 2025-06-16 RX ORDER — PROCHLORPERAZINE EDISYLATE 5 MG/ML
5 INJECTION INTRAMUSCULAR; INTRAVENOUS EVERY 6 HOURS PRN
Status: DISCONTINUED | OUTPATIENT
Start: 2025-06-16 | End: 2025-07-03 | Stop reason: HOSPADM

## 2025-06-16 RX ORDER — TIZANIDINE 4 MG/1
4 TABLET ORAL 3 TIMES DAILY PRN
Status: ON HOLD | COMMUNITY
End: 2025-07-03 | Stop reason: HOSPADM

## 2025-06-16 RX ORDER — PANTOPRAZOLE SODIUM 40 MG/1
40 TABLET, DELAYED RELEASE ORAL DAILY
COMMUNITY

## 2025-06-16 RX ORDER — LORAZEPAM 2 MG/ML
1 INJECTION INTRAMUSCULAR
Status: DISCONTINUED | OUTPATIENT
Start: 2025-06-16 | End: 2025-06-19

## 2025-06-16 RX ORDER — NAPROXEN 500 MG/1
500 TABLET ORAL 2 TIMES DAILY
Status: ON HOLD | COMMUNITY
End: 2025-07-03 | Stop reason: HOSPADM

## 2025-06-16 RX ORDER — IBUPROFEN 200 MG
16 TABLET ORAL
Status: DISCONTINUED | OUTPATIENT
Start: 2025-06-16 | End: 2025-07-03 | Stop reason: HOSPADM

## 2025-06-16 RX ORDER — TRAZODONE HYDROCHLORIDE 100 MG/1
100 TABLET ORAL NIGHTLY
COMMUNITY

## 2025-06-16 RX ADMIN — LORAZEPAM 2 MG: 2 INJECTION INTRAMUSCULAR; INTRAVENOUS at 04:06

## 2025-06-16 RX ADMIN — SODIUM CHLORIDE: 9 INJECTION, SOLUTION INTRAVENOUS at 07:06

## 2025-06-16 RX ADMIN — IOHEXOL 75 ML: 350 INJECTION, SOLUTION INTRAVENOUS at 01:06

## 2025-06-16 RX ADMIN — MIDAZOLAM HYDROCHLORIDE 2 MG: 1 INJECTION, SOLUTION INTRAMUSCULAR; INTRAVENOUS at 03:06

## 2025-06-16 RX ADMIN — METOPROLOL TARTRATE 5 MG: 1 INJECTION, SOLUTION INTRAVENOUS at 01:06

## 2025-06-16 RX ADMIN — HYDRALAZINE HYDROCHLORIDE 10 MG: 20 INJECTION INTRAMUSCULAR; INTRAVENOUS at 08:06

## 2025-06-16 RX ADMIN — FAMOTIDINE 20 MG: 10 INJECTION, SOLUTION INTRAVENOUS at 07:06

## 2025-06-16 RX ADMIN — LORAZEPAM 1 MG: 2 INJECTION INTRAMUSCULAR; INTRAVENOUS at 09:06

## 2025-06-16 RX ADMIN — HYDRALAZINE HYDROCHLORIDE 10 MG: 20 INJECTION INTRAMUSCULAR; INTRAVENOUS at 01:06

## 2025-06-16 NOTE — H&P
HOSPITAL MEDICINE - ADMISSION NOTE    Chief Complaint  Unresponsiveness     History of Present Illness     This is a 60 years old male patient with unknown PMH but taking Trazodone, Baclofen, and Tizanidine was brought to the emergency room to be evaluated for unresponsiveness. Patient is currently pleasantly confused, spontaneously moving extremities in the bed but not following command verbally. Entire history is obtained from emergency and nursing report. I did try to reach to patients mother but unable to reach. As per the report patient was at [facility] this morning and went for the washroom around 9. His mother found him unresponsive in the washroom around 10:30 but for some reason she did not call 911 until 1:30 PM. She thought that patient will be responsive, but he did not so 911 was eventually called. On arrival CT scan of the head, CTA of the head and neck and MRI of the brain were obtained. Neurologist was also consulted. MRI of the brain is negative for any acute infarct, CT head is also negative for any acute findings as well as CT of the head and neck. Initial workup showed ammonia level of 166, hemoglobin 9.3, platelet count 75, sodium 141, potassium 3.9, and dimer of 0.85. Hie did get CTA head and neck so CTA chest was not obtained. On arrival patient's GCS level was only 8 and was only responsive to painful stimuli. During the course of the emergency room he has become more awake and GCS score improved. Before MRI patient was agitated so he required IV Versed and Lorazepam. Medicine team is asked to hospitalize the patient for further care. No fever or diarrhea or reported chest pain or SOB or tingling or numbness or slurred speech or focal weakness prior to unresponsiveness.      Review of Systems  10 point review of negative unless noted in HPI.    PSH and PMH: As in HPI    Social History     Social History Narrative    Not on file      Review of patient's allergies indicates:  No Known  Allergies    Objective   BP (!) 169/96   Pulse 85   Temp 96.9 °F (36.1 °C) (Axillary)   Resp 17   Wt 81.2 kg (179 lb)   SpO2 100%       Physical Exam    General: Not alert.        Eye: Normal conjunctiva.  HENT: Normocephalic.   Neck: Supple  Respiratory: Non labored respiration.    Cardiovascular: Normal rate  Gastrointestinal:  Not distended.   Integumentary: No rash.    Neurologic: Not following command.     Assessment and Plan     Syncope   Vasovagal versus hepatic encephalopathy   Fall precautions   Seizure precautions   Close monitoring   Will check vitamin levels   PTOT consult   Will keep NPO until patient becomes fully alert.   Gentle IV fluid   UDS neg   Likely polypharmacy due to Baclofen,Tizanidine and Trazodone as well     Elevated dimer   Will obtain CTA chest to rule out PE tomorrow     Thrombocytopenia   Unclear   Close monitoring     Probable hepatic encephalopathy   Close monitoring   Will provide Lactulose   Will get hepatitis panel and liver US     CODE STATUS: Full Code. Information obtained from ER report.     Anticipate > 2 midnights stay, patient will be admitted as an inpatient for above mentioned reasons.      Reviewed Home medications, current medications and inpatient medications with patient.    Patient Screened for food insecurity, housing instability, transportation needs, utility difficulties, living conditions and interpersonal safety.   No needs identified.     Virtual Information and Consent      Location Information:  Patient State (at time of visit): [place]  Patient Location (at time of visit): [facility]  Provider Location: [place]  Is provider licensed to provide clinical care in the current location/state of the patient? Yes     Consent:   Patient's identity was confirmed.  Presenting condition or illness was discussed with the patient/personal representative.  Current proposed treatment for presenting condition or illness was explained to patient/personal representative along with the  likely benefits and any significant risks or complications associated with the provision of treatment by audio/video means.  The patient/personal representative verbally authorized treatment to be provided by audio/video, which may include a limited review of patient's current health status, medication, or other treatment recommendations, patient education, and an opportunity to ask questions about condition and treatment.  Verbal Consent Granted by Patient/Personal Representative:Yes      Visit Information:  Modality: 2-Way Real-Time Audio/Video   Video Start Time: 1810  Video Stop Time: 1820  Video Total Time: 10     I have personally spent 55 minutes involved in face-to-face and non-face-to-face activities for this patient on the day of the visit.  Professional time spent includes the following activities, in addition to those noted in the documentation: Chart review, documentation, blood work/results review and care coordination.      Patient's RN was present during visit.

## 2025-06-16 NOTE — NURSING
Pt arouses to pain, nonverbal at this time. Unable to assess orientation due to drowsy state. Spoke with pt mother Niyah and father Yong  To give update upon arrival to ICU. No questions or complaints verbalized at this time.

## 2025-06-16 NOTE — ED PROVIDER NOTES
Encounter Date: 6/16/2025       History     Chief Complaint   Patient presents with    Loss of Consciousness     PT to ER via [facility], report states PT was having a BM when he went unconscious. PT seems to be sleeping upon arrival. Responds to painful stimuli. PT holding head up off stretcher but does not respond to commands.       60-year-old male arrives via EMS with altered mental status.  He reportedly passed out while having a bowel movement.  He has not woken up since.  His head is off the EMS stretcher.  He is only responding to pain.  GCS equals 8.    The history is provided by the EMS personnel.     Review of patient's allergies indicates:  No Known Allergies  No past medical history on file.  No past surgical history on file.  No family history on file.  Social History[1]  Review of Systems   Constitutional:  Negative for fever.   HENT:  Negative for sore throat.    Respiratory:  Negative for shortness of breath.    Cardiovascular:  Negative for chest pain.   Gastrointestinal:  Negative for nausea.   Genitourinary:  Negative for dysuria.   Musculoskeletal:  Negative for back pain.   Skin:  Negative for rash.   Neurological:  Positive for syncope. Negative for weakness.        Altered mental status   Hematological:  Does not bruise/bleed easily.   All other systems reviewed and are negative.      Physical Exam     Initial Vitals [06/16/25 1312]   BP Pulse Resp Temp SpO2   (!) 181/118 65 12 96.9 °F (36.1 °C) 100 %      MAP       --         Physical Exam    Nursing note and vitals reviewed.  Constitutional: Vital signs are normal. He appears well-developed and well-nourished. He is cooperative.   HENT:   Head: Normocephalic and atraumatic. Mouth/Throat: Oropharynx is clear and moist.   Eyes: Conjunctivae and lids are normal. Pupils are equal, round, and reactive to light.   Eyes are both pointing downwords   Neck: Trachea normal. Neck supple.   Normal range of motion.  Cardiovascular:  Normal rate,  regular rhythm, normal heart sounds and intact distal pulses.           Pulmonary/Chest: Breath sounds normal.   Abdominal: Abdomen is soft. Bowel sounds are normal.   Musculoskeletal:         General: Normal range of motion.      Cervical back: Normal, normal range of motion and neck supple.      Lumbar back: Normal.     Neurological: Coordination normal.   He withdrawals from pain.  His head is off the stretcher.   Skin: Skin is warm, dry and intact. Capillary refill takes less than 2 seconds.   Psychiatric: His speech is normal. Cognition and memory are normal.         ED Course   Critical Care    Date/Time: 6/16/2025 1:10 PM    Performed by: True Jennings MD  Authorized by: Taisha Cesar MD  Direct patient critical care time: 30 minutes  Additional history critical care time: 20 minutes  Ordering / reviewing critical care time: 20 minutes  Documentation critical care time: 20 minutes  Consulting other physicians critical care time: 15 minutes  Consult with family critical care time: 15 minutes  Total critical care time (exclusive of procedural time) : 120 minutes  Critical care time was exclusive of separately billable procedures and treating other patients and teaching time.  Critical care was necessary to treat or prevent imminent or life-threatening deterioration of the following conditions: CNS failure or compromise and toxidrome.  Critical care was time spent personally by me on the following activities: development of treatment plan with patient or surrogate, discussions with consultants, evaluation of patient's response to treatment, examination of patient, obtaining history from patient or surrogate, ordering and performing treatments and interventions, ordering and review of laboratory studies, ordering and review of radiographic studies, pulse oximetry, re-evaluation of patient's condition and review of old charts.  Subsequent provider of critical care: I assumed direction of critical care for  this patient from another provider of my specialty.        Labs Reviewed   COMPREHENSIVE METABOLIC PANEL - Abnormal       Result Value    Sodium 141      Potassium 3.9      Chloride 116 (*)     CO2 22      Glucose 117 (*)     Blood Urea Nitrogen 11      Creatinine 1.08      Calcium 8.9      Protein Total 7.7      Albumin 3.3 (*)     Globulin 4.4 (*)     Albumin/Globulin Ratio 0.8      Bilirubin Total 3.5 (*)      (*)     ALT 30      AST 69 (*)     eGFR 79      Anion Gap 3.0      BUN/Creatinine Ratio 10 (*)    URINALYSIS, REFLEX TO URINE CULTURE - Abnormal    Color, UA Yellow      Appearance, UA Clear      Specific Gravity, UA 1.020      pH, UA 7.5      Protein, UA Negative      Glucose, UA Negative      Ketones, UA Negative      Blood, UA Negative      Bilirubin, UA Negative      Urobilinogen, UA 2.0 (*)     Nitrites, UA Negative      Leukocyte Esterase, UA Negative      Narrative:      URINE STABILITY IS 2 HOURS AT ROOM TEMP OR    SIX HOURS REFRIGERATED. PERFORMING TESTING ON    SPECIMENS GREATER THAN THIS AGE MAY AFFECT THE    FOLLOWING TESTS:    PH          SPECIFIC GRAVITY           BLOOD    CLARITY     BILIRUBIN               UROBILINOGEN   ACETAMINOPHEN LEVEL - Abnormal    Acetaminophen Level <10.0 (*)    AMMONIA - Abnormal    Ammonia Level 165.0 (*)    CBC WITH DIFFERENTIAL - Abnormal    WBC 5.10      RBC 3.05 (*)     Hgb 9.3 (*)     Hct 27.6 (*)     MCV 90.5      MCH 30.5      MCHC 33.7      RDW 18.0      Platelet 75 (*)     MPV        Neut % 57.2      Lymph % 22.2      Mono % 14.3 (*)     Eos % 4.7      Basophil % 1.2      Lymph # 1.13 (*)     Neut # 2.92      Mono # 0.73      Eos # 0.24      Baso # 0.06      Imm Gran # 0.02      Imm Grans % 0.4      NRBC% 0.0     D DIMER, QUANTITATIVE - Abnormal    D-Dimer 0.85 (*)    BLOOD SMEAR MICROSCOPIC EXAM (OLG) - Abnormal    Platelets Decreased (*)    DRUG SCREEN, URINE (BEAKER) - Normal    Amphetamines, Urine Negative      Barbiturates, Urine Negative       Benzodiazepine, Urine Negative      Cannabinoids, Urine Negative      Cocaine, Urine Negative      Opiates, Urine Negative      Phencyclidine, Urine Negative      Methadone, Urine Negative      pH, Urine 7.5      Narrative:     Cut off concentrations:    Amphetamines - 1000 ng/ml  Barbiturates - 200 ng/ml  Benzodiazepine - 200 ng/ml  Cannabinoids (THC) - 50 ng/ml  Cocaine - 300 ng/ml  Fentanyl - 1.0 ng/ml  MDMA - 500 ng/ml  Opiates - 300 ng/ml   Phencyclidine (PCP) - 25 ng/ml  Methadone - 300 ng/ml      False negatives may result form substances such as bleach added to urine.  False positives may result for the presence of a substance with similar chemical structure to the drug or its metabolite.    This test provides only a PRELIMINARY analytical test result. A more specific alternate chemical method must be used in order to obtain a confirmed analytical result. Gas chromatography/mass spectrometry (GC/MS) is the preferred confirmatory method. Other chemical confirmation methods are available. Clinical consideration and professional judgement should be applied to any drug of abuse test result, particularly when preliminary positive results are used.    Positive results will be confirmed only at the physicians request. Unconfirmed screening results are to be used only for medical purposes (treatment).          ALCOHOL,MEDICAL (ETHANOL) - Normal    Ethanol Level <10.0      Alcohol, Legal Level <0.010     SALICYLATE LEVEL - Normal    Salicylate Level <1.0     TROPONIN I - Normal    Troponin-I <0.012     NT-PRO NATRIURETIC PEPTIDE - Normal    ProBNP 43.8     MAGNESIUM - Normal    Magnesium Level 1.90     CBC W/ AUTO DIFFERENTIAL    Narrative:     The following orders were created for panel order CBC auto differential.  Procedure                               Abnormality         Status                     ---------                               -----------         ------                     CBC with  Differential[[id number]]       Abnormal            Final result                 Please view results for these tests on the individual orders.        ECG Results              EKG 12-lead (Preliminary result)  Result time 06/16/25 13:23:31      Wet Read by True Jennings MD (06/16/25 13:23:31, [facility], Emergency Medicine)    Normal sinus rhythm, heart rate 69, there is normal intervals, left axis deviation, normal P waves, normal QRS, nonspecific ST and T-wave changes without evidence of acute ischemia                                  Imaging Results              MRI Brain Without Contrast (In process)  Result time 06/16/25 15:54:29                     CTA Head and Neck (xpd) (Final result)  Result time 06/16/25 14:50:57      Final result by Nasir Aden III, MD (06/16/25 14:50:57)                   Impression:      1. Calcified atherosclerotic plaquing is noted within the origin of the right internal carotid artery resulting in 50-70% stenosis subjectively.  2. 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  3. Intracranial vasculature is less than optimally delineated with no gross abnormalities involving the [place] or its primary branches.  1.    Electronically signed by: Nasir Aden  Date:    06/16/2025  Time:    14:50               Narrative:    EXAMINATION:  STUDY: CTA HEAD AND NECK (XPD)    CLINICAL HISTORY AND TECHNIQUE:  Suspected TIA versus cerebral infarct    This patient has had 2 CT and nuclear medicine scans performed within the last 12 months.    The following DOSE REDUCTION TECHNIQUES are used for all CT scans at [facility]:    1. Automated exposure control.  2. Adjustment of the mA and/or kv according to patient size.  3. Use of iterative reconstruction technique.    COMPARISON:  CT scan of the head/brain obtained earlier  the same day    FINDINGS:  RIGHT CAROTID ARTERIAL VASCULATURE:    Common carotid artery: No significant atherosclerotic plaquing, hemodynamically significant stenosis or other vascular pathology is appreciated.    Internal carotid artery: Moderate, calcified atherosclerotic plaque is noted within the origin of the right internal carotid artery resulting in 50-70% stenosis subjectively.    External carotid artery: No significant atherosclerotic plaquing, hemodynamically significant stenosis or other vascular pathology is appreciated.    LEFT CAROTID ARTERIAL VASCULATURE:    Common carotid artery: No significant atherosclerotic plaquing, hemodynamically significant stenosis or other vascular pathology is appreciated.    Internal carotid artery: No significant atherosclerotic plaquing, hemodynamically significant stenosis or other vascular pathology is appreciated.    External carotid artery: No significant atherosclerotic plaquing, hemodynamically significant stenosis or other vascular pathology is appreciated.    There is marked atrophy of the right vertebral artery with the basilar artery perfuse predominantly via a dominant left vertebral artery.  The vertebrobasilar system appears otherwise unremarkable.  Imaging of the [place] is less than optimal with no definite intracranial abnormalities appreciated.                                       CT Head Without Contrast (Final result)  Result time 06/16/25 13:48:57      Final result by Nasir Aden III, MD (06/16/25 13:48:57)                   Impression:      1. Mild cerebral atrophy accentuates the ventricles and sulci.      Electronically signed by: Nasir Aden  Date:    06/16/2025  Time:    13:48               Narrative:    EXAMINATION:  STUDY: CT HEAD WITHOUT CONTRAST    CLINICAL HISTORY AND TECHNIQUE:  Altered mental status    This patient has had 0 CT and nuclear medicine scans performed within the last 12 months.    The following DOSE REDUCTION  TECHNIQUES are used for all CT scans at [facility]:    1. Automated exposure control.  2. Adjustment of the mA and/or kv according to patient size.  3. Use of iterative reconstruction technique.    COMPARISON:  None    FINDINGS:  Axial imaging through the head/brain was performed. Mild cerebral atrophy accentuates the ventricles and sulci.  I see no intraparenchymal masses, hemorrhagic lesions, or dominant wedge-shaped infarcts. I see no extra-axial masses or abnormal fluid collections.                                       X-Ray Chest AP Portable (Final result)  Result time 06/16/25 13:34:19      Final result by Nasir Aden III, MD (06/16/25 13:34:19)                   Impression:      1. I see no lobar or segmental infiltrates or other significant abnormalities.See above comments.      Electronically signed by: Nasir Aden  Date:    06/16/2025  Time:    13:34               Narrative:    EXAMINATION:  STUDY: XR CHEST AP PORTABLE    CLINICAL HISTORY AND TECHNIQUE:  Altered mental status    COMPARISON:  None    FINDINGS:  The lungs are under expanded exaggerating the pulmonary vasculature.The cardiac, hilar, and mediastinal contours appear unremarkable.I see no lobar or segmental infiltrates.No significant pleural effusions are noted.Mild degenerative changes are noted involving the thoracic spine.                                       Medications   sodium chloride 0.9% flush 10 mL (has no administration in time range)   ondansetron injection 4 mg (has no administration in time range)   prochlorperazine injection Soln 5 mg (has no administration in time range)   famotidine (PF) injection 20 mg (has no administration in time range)   metoprolol injection 5 mg (5 mg Intravenous Given 6/16/25 1334)   hydrALAZINE injection 10 mg (10 mg Intravenous Given 6/16/25 1340)   iohexoL (OMNIPAQUE 350) injection 75 mL (75 mLs Intravenous Given 6/16/25 1356)   midazolam (PF) (VERSED) 1 mg/mL injection 2 mg (2  mg Intravenous Given 6/16/25 1527)   LORazepam injection 2 mg (2 mg Intravenous Given 6/16/25 2112)     Medical Decision Making  Sudden-onset altered mental status.  GCS equals 8, although his head is off the stretcher.  Differential diagnosis:  Subarachnoid hemorrhage, brainstem CVA, other massive CVA, aortic dissection with cerebral ischemia, toxicity of some kind, substance abuse, psychiatric illness  CT head, chest x-ray, EKG, labs    Amount and/or Complexity of Data Reviewed  Independent Historian: parent     Details: Patient lives with his parents.  He was last seen normal at 9:00 a.m..  His mother walked in on him in the bathroom, and he was unresponsive, at 10:30 a.m..  She thought it would get better, but it never did.  She finally called EMS at about 1:30 p.m. neither the mother nor the father know much of anything about his medical history.  [facility], and he is quite secretive about his medical problems.  His mother also reports that he is a heavy drinker.  She has no idea whether he could be depressed or suicidal.  Labs: ordered.  Radiology: ordered.  Discussion of management or test interpretation with external provider(s): He is now scratching his head, using purposeful movements, but only seems to be doing it with the right hand.  GCS equals 9.    Patient is now moving around in bed.  He is trying to get out of the bed.  This is looking more like an overdose, or hepatic encephalopathy with an elevated ammonia, with gradual resolution.  The mother brought in his meds.  He is on several muscle relaxers.  This certainly could be an overdose of tizanidine and/or baclofen.  He is coming around more and more.  GCS is now 11.    We will admit him to the ICU.  Diagnosis is altered mental status, hepatic encephalopathy, possible overdose, possible alcohol withdrawal.  I discussed the case with the tele hospitalist.  Admission orders, with lactulose, Ativan PRN and labetalol p.r.n., and daily thiamine  are ordered.  Med rec is completed.      If the MRI is negative, and he continues improving, we may just admit him to observation.    Patient discussed with the interventional neurologist at [facility].  He recommended a stat MRI.  If the MRI shows a CVA, he should be transferred to [facility].  If it does not, he can stay here.    Risk  Prescription drug management.  Decision regarding hospitalization.                                      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

## 2025-06-17 PROBLEM — Z79.899 POLYPHARMACY: Status: ACTIVE | Noted: 2025-06-17

## 2025-06-17 LAB
25(OH)D3+25(OH)D2 SERPL-MCNC: 26 NG/ML (ref 30–80)
ALBUMIN SERPL-MCNC: 2.9 G/DL (ref 3.4–5)
ALBUMIN/GLOB SERPL: 0.7 RATIO
ALP SERPL-CCNC: 142 UNIT/L (ref 50–144)
ALT SERPL-CCNC: 32 UNIT/L (ref 1–45)
AMMONIA PLAS-MSCNC: 56 UMOL/L (ref 11–32)
ANION GAP SERPL CALC-SCNC: 3 MEQ/L (ref 2–13)
AST SERPL-CCNC: 65 UNIT/L (ref 17–59)
BASOPHILS # BLD AUTO: 0.03 X10(3)/MCL (ref 0.01–0.08)
BASOPHILS NFR BLD AUTO: 0.8 % (ref 0.1–1.2)
BILIRUB SERPL-MCNC: 5.6 MG/DL (ref 0–1)
BUN SERPL-MCNC: 11 MG/DL (ref 7–20)
CALCIUM SERPL-MCNC: 8.7 MG/DL (ref 8.4–10.2)
CHLORIDE SERPL-SCNC: 118 MMOL/L (ref 98–110)
CO2 SERPL-SCNC: 19 MMOL/L (ref 21–32)
COLOR STL: ABNORMAL
CONSISTENCY STL: ABNORMAL
CREAT SERPL-MCNC: 0.88 MG/DL (ref 0.66–1.25)
CREAT/UREA NIT SERPL: 13 (ref 12–20)
EOSINOPHIL # BLD AUTO: 0.14 X10(3)/MCL (ref 0.04–0.54)
EOSINOPHIL NFR BLD AUTO: 3.8 % (ref 0.7–7)
ERYTHROCYTE [DISTWIDTH] IN BLOOD BY AUTOMATED COUNT: 17.9 %
FERRITIN SERPL-MCNC: 62.1 NG/ML (ref 17.9–464)
FOLATE SERPL-MCNC: 6.2 NG/ML (ref 2.8–20)
GFR SERPLBLD CREATININE-BSD FMLA CKD-EPI: >90 ML/MIN/1.73/M2
GLOBULIN SER-MCNC: 4.2 GM/DL (ref 2–3.9)
GLUCOSE SERPL-MCNC: 108 MG/DL (ref 70–115)
HAV IGM SERPL QL IA: NONREACTIVE
HBV CORE IGM SERPL QL IA: NONREACTIVE
HBV SURFACE AG SERPL QL IA: NONREACTIVE
HCT VFR BLD AUTO: 27.1 % (ref 36–52)
HCV AB SERPL QL IA: NONREACTIVE
HEMOCCULT SP1 STL QL: POSITIVE
HGB BLD-MCNC: 9 G/DL (ref 13–18)
IMM GRANULOCYTES # BLD AUTO: 0.01 X10(3)/MCL (ref 0–0.03)
IMM GRANULOCYTES NFR BLD AUTO: 0.3 % (ref 0–0.5)
IRON SATN MFR SERPL: ABNORMAL %
IRON SERPL-MCNC: 210 UG/DL (ref 65–175)
LYMPHOCYTES # BLD AUTO: 0.71 X10(3)/MCL (ref 1.32–3.57)
LYMPHOCYTES NFR BLD AUTO: 19.2 % (ref 20–55)
MAGNESIUM SERPL-MCNC: 1.7 MG/DL (ref 1.8–2.4)
MCH RBC QN AUTO: 30.1 PG (ref 27–34)
MCHC RBC AUTO-ENTMCNC: 33.2 G/DL (ref 31–37)
MCV RBC AUTO: 90.6 FL (ref 79–99)
MONOCYTES # BLD AUTO: 0.59 X10(3)/MCL (ref 0.3–0.82)
MONOCYTES NFR BLD AUTO: 15.9 % (ref 4.7–12.5)
NEUTROPHILS # BLD AUTO: 2.22 X10(3)/MCL (ref 1.78–5.38)
NEUTROPHILS NFR BLD AUTO: 60 % (ref 37–73)
OHS QRS DURATION: 96 MS
OHS QTC CALCULATION: 473 MS
PHOSPHATE SERPL-MCNC: 3.1 MG/DL (ref 2.5–4.9)
PLATELET # BLD AUTO: 61 X10(3)/MCL (ref 140–371)
PMV BLD AUTO: ABNORMAL FL
POTASSIUM SERPL-SCNC: 3.5 MMOL/L (ref 3.5–5.1)
PROT SERPL-MCNC: 7.1 GM/DL (ref 6.3–8.2)
RBC # BLD AUTO: 2.99 X10(6)/MCL (ref 4–6)
SODIUM SERPL-SCNC: 140 MMOL/L (ref 136–145)
TIBC SERPL-MCNC: <25 UG/DL (ref 60–240)
TIBC SERPL-MCNC: ABNORMAL UG/DL
TRANSFERRIN SERPL-MCNC: 203 MG/DL (ref 174–364)
VIT B12 SERPL-MCNC: >1000 PG/ML (ref 211–946)
WBC # BLD AUTO: 3.7 X10(3)/MCL (ref 4–11.5)

## 2025-06-17 PROCEDURE — 20000000 HC ICU ROOM

## 2025-06-17 PROCEDURE — 63600175 PHARM REV CODE 636 W HCPCS

## 2025-06-17 PROCEDURE — 25000003 PHARM REV CODE 250

## 2025-06-17 PROCEDURE — 25000003 PHARM REV CODE 250: Performed by: HOSPITALIST

## 2025-06-17 PROCEDURE — 82728 ASSAY OF FERRITIN: CPT | Performed by: FAMILY MEDICINE

## 2025-06-17 PROCEDURE — 82746 ASSAY OF FOLIC ACID SERUM: CPT | Performed by: FAMILY MEDICINE

## 2025-06-17 PROCEDURE — 63600175 PHARM REV CODE 636 W HCPCS: Performed by: FAMILY MEDICINE

## 2025-06-17 PROCEDURE — 82140 ASSAY OF AMMONIA: CPT | Performed by: FAMILY MEDICINE

## 2025-06-17 PROCEDURE — 83735 ASSAY OF MAGNESIUM: CPT | Performed by: HOSPITALIST

## 2025-06-17 PROCEDURE — 84100 ASSAY OF PHOSPHORUS: CPT | Performed by: HOSPITALIST

## 2025-06-17 PROCEDURE — 82272 OCCULT BLD FECES 1-3 TESTS: CPT | Performed by: FAMILY MEDICINE

## 2025-06-17 PROCEDURE — 25000003 PHARM REV CODE 250: Performed by: FAMILY MEDICINE

## 2025-06-17 PROCEDURE — 82607 VITAMIN B-12: CPT | Performed by: FAMILY MEDICINE

## 2025-06-17 PROCEDURE — 83540 ASSAY OF IRON: CPT | Performed by: FAMILY MEDICINE

## 2025-06-17 PROCEDURE — 80053 COMPREHEN METABOLIC PANEL: CPT

## 2025-06-17 PROCEDURE — 36415 COLL VENOUS BLD VENIPUNCTURE: CPT | Performed by: FAMILY MEDICINE

## 2025-06-17 PROCEDURE — 85025 COMPLETE CBC W/AUTO DIFF WBC: CPT

## 2025-06-17 RX ORDER — CLONIDINE HYDROCHLORIDE 0.1 MG/1
0.2 TABLET ORAL EVERY 6 HOURS PRN
Status: DISCONTINUED | OUTPATIENT
Start: 2025-06-17 | End: 2025-07-03 | Stop reason: HOSPADM

## 2025-06-17 RX ORDER — LORAZEPAM 1 MG/1
1 TABLET ORAL EVERY 6 HOURS
Status: DISCONTINUED | OUTPATIENT
Start: 2025-06-17 | End: 2025-06-18

## 2025-06-17 RX ORDER — MAGNESIUM SULFATE HEPTAHYDRATE 40 MG/ML
2 INJECTION, SOLUTION INTRAVENOUS ONCE
Status: COMPLETED | OUTPATIENT
Start: 2025-06-17 | End: 2025-06-17

## 2025-06-17 RX ADMIN — FAMOTIDINE 20 MG: 10 INJECTION, SOLUTION INTRAVENOUS at 09:06

## 2025-06-17 RX ADMIN — LORAZEPAM 1 MG: 1 TABLET ORAL at 12:06

## 2025-06-17 RX ADMIN — MAGNESIUM SULFATE HEPTAHYDRATE 2 G: 40 INJECTION, SOLUTION INTRAVENOUS at 09:06

## 2025-06-17 RX ADMIN — LORAZEPAM 1 MG: 2 INJECTION INTRAMUSCULAR; INTRAVENOUS at 12:06

## 2025-06-17 RX ADMIN — LACTULOSE 30 G: 20 SOLUTION ORAL at 12:06

## 2025-06-17 RX ADMIN — THIAMINE HYDROCHLORIDE 100 MG: 100 INJECTION, SOLUTION INTRAMUSCULAR; INTRAVENOUS at 09:06

## 2025-06-17 RX ADMIN — MUPIROCIN: 20 OINTMENT TOPICAL at 08:06

## 2025-06-17 RX ADMIN — LACTULOSE 30 G: 20 SOLUTION ORAL at 08:06

## 2025-06-17 RX ADMIN — PROPRANOLOL HYDROCHLORIDE 20 MG: 20 TABLET ORAL at 08:06

## 2025-06-17 RX ADMIN — LORAZEPAM 1 MG: 2 INJECTION INTRAMUSCULAR; INTRAVENOUS at 04:06

## 2025-06-17 RX ADMIN — LORAZEPAM 1 MG: 2 INJECTION INTRAMUSCULAR; INTRAVENOUS at 02:06

## 2025-06-17 RX ADMIN — LORAZEPAM 1 MG: 1 TABLET ORAL at 11:06

## 2025-06-17 RX ADMIN — LACTULOSE 200 G: 20 SOLUTION ORAL at 01:06

## 2025-06-17 RX ADMIN — FAMOTIDINE 20 MG: 10 INJECTION, SOLUTION INTRAVENOUS at 08:06

## 2025-06-17 RX ADMIN — MUPIROCIN: 20 OINTMENT TOPICAL at 09:06

## 2025-06-17 RX ADMIN — SODIUM CHLORIDE: 9 INJECTION, SOLUTION INTRAVENOUS at 01:06

## 2025-06-17 NOTE — NURSING
Message md via telemed to adjust lactulose order so I can pull it from pyxis to try and give lactulose enema for elevated ammonia.  @0021:per Dr Veras (via chat)ok to override po order for lactulose to pull 200g to be mixed w/water for enema given rectally.  @0115:giving rectal enema w/lactulose,pt tolerating well.  @0215:released enema,small amount of blood noted to rectal area and on pad,700ml fluid released into enema bag,brown mixed w/med and some blood,small stool particles noted in bag also.pt sleeping,tolerated well,vss.

## 2025-06-17 NOTE — NURSING
"Pt was placed on bedpan when requested to use. Allowed time for pt to use bedpan, pt removed bedpan and had bowel movement on the absorbant pad. When attempted to turn patient to change him he yelled out "leave me alone" and began kicking and resisting attempts to reposition him off the soiled pads. Pt became continued yelling "leave me alone". Pt was verbally redirected and responded appropriately to redirection.  "

## 2025-06-17 NOTE — ASSESSMENT & PLAN NOTE
Anemia is likely due to chronic blood loss and Iron deficiency. Most recent hemoglobin and hematocrit are listed below.  Recent Labs     07/01/25  0447 07/02/25  0452 07/03/25  0501   HGB 6.9* 7.9* 8.5*   HCT 21.0* 23.8* 25.7*   Have discussed with pt to avoid ETOH

## 2025-06-17 NOTE — PLAN OF CARE
Problem: Adult Inpatient Plan of Care  Goal: Plan of Care Review  Outcome: Progressing  Goal: Patient-Specific Goal (Individualized)  Outcome: Progressing  Goal: Absence of Hospital-Acquired Illness or Injury  Outcome: Progressing  Goal: Optimal Comfort and Wellbeing  Outcome: Progressing  Goal: Readiness for Transition of Care  Outcome: Progressing     Problem: Delirium  Goal: Optimal Coping  Outcome: Progressing  Goal: Improved Behavioral Control  Outcome: Progressing  Goal: Improved Attention and Thought Clarity  Outcome: Progressing  Goal: Improved Sleep  Outcome: Progressing     Problem: Delirium  Goal: Optimal Coping  Outcome: Progressing  Goal: Improved Behavioral Control  Outcome: Progressing  Goal: Improved Attention and Thought Clarity  Outcome: Progressing  Goal: Improved Sleep  Outcome: Progressing     Problem: Fall Injury Risk  Goal: Absence of Fall and Fall-Related Injury  Outcome: Progressing     Problem: Restraint, Nonviolent  Goal: Absence of Harm or Injury  Outcome: Progressing

## 2025-06-17 NOTE — NURSING
"Pt's bp is elevated and ammonia level was elevated on labs,attempted to get pt to take meds by mouth,pt refused,when trying to do initial assessment pt's only response was hollering out, "stop talking". Gave prn ivp hydralazine for elevated bp.  "

## 2025-06-17 NOTE — PLAN OF CARE
Problem: Adult Inpatient Plan of Care  Goal: Absence of Hospital-Acquired Illness or Injury  Outcome: Progressing     Problem: Fall Injury Risk  Goal: Absence of Fall and Fall-Related Injury  Outcome: Progressing     Problem: Restraint, Nonviolent  Goal: Absence of Harm or Injury  Outcome: Progressing     Problem: Adult Inpatient Plan of Care  Goal: Plan of Care Review  Outcome: Not Progressing  Goal: Optimal Comfort and Wellbeing  Outcome: Not Progressing  Goal: Readiness for Transition of Care  Outcome: Not Progressing     Problem: Delirium  Goal: Optimal Coping  Outcome: Not Progressing  Goal: Improved Behavioral Control  Outcome: Not Progressing  Goal: Improved Attention and Thought Clarity  Outcome: Not Progressing  Goal: Improved Sleep  Outcome: Not Progressing

## 2025-06-17 NOTE — NURSING
"Prn ivp ativan given to help pt relax,pt keeps trying to get out of bed,explained to pt that it's still dark outside and pt is in hospital and too weak to get up,pt hollers out,"let me go". Covered pt w/his blankets as he is shivering,after giving ativan and getting him to relax,pt appears to be going back to sleep.  @0500:pt trying to get out of bed,pt pulling at iv line,pt pulling off scd,pt hollering "let me go",pt unable to rest/relax.pt throwing legs over bedrail and leaning his upper body over bed rail,order for restraints to keep pt from harming himself  @0501:richy. Wrist restraints initiated.  "

## 2025-06-17 NOTE — ASSESSMENT & PLAN NOTE
Patient's blood pressure range in the last 24 hours was: BP  Min: 118/71  Max: 155/98.The patient's inpatient anti-hypertensive regimen is listed below:  Current Antihypertensives  , 2 times daily, Oral  propranoloL tablet 20 mg, 2 times daily, Oral  labetalol 20 mg/4 mL (5 mg/mL) IV syring, Every 4 hours PRN, Intravenous  hydrALAZINE injection 10 mg, Every 6 hours PRN, Intravenous  cloNIDine tablet 0.2 mg, Every 6 hours PRN, Oral  losartan tablet 50 mg, Daily, Oral  losartan (COZAAR) tablet, Daily, Oral    Plan  - BP is controlled, no changes needed to their regimen  -  losartan and propanolol at d/c

## 2025-06-17 NOTE — PROGRESS NOTES
HOSPITAL MEDICINE - ADMISSION NOTE    Chief Complaint  Unresponsiveness     History of Present Illness     This is a 60 years old male patient with unknown PMH but taking Trazodone, Baclofen, and Tizanidine was brought to the emergency room to be evaluated for unresponsiveness. Patient is currently pleasantly confused, spontaneously moving extremities in the bed but not following command verbally. Entire history is obtained from emergency and nursing report. I did try to reach to patients mother but unable to reach. As per the report patient was at [facility] this morning and went for the washroom around 9. His mother found him unresponsive in the washroom around 10:30 but for some reason she did not call 911 until 1:30 PM. She thought that patient will be responsive, but he did not so 911 was eventually called. On arrival CT scan of the head, CTA of the head and neck and MRI of the brain were obtained. Neurologist was also consulted. MRI of the brain is negative for any acute infarct, CT head is also negative for any acute findings as well as CT of the head and neck. Initial workup showed ammonia level of 166, hemoglobin 9.3, platelet count 75, sodium 141, potassium 3.9, and dimer of 0.85. Hie did get CTA head and neck so CTA chest was not obtained. On arrival patient's GCS level was only 8 and was only responsive to painful stimuli. During the course of the emergency room he has become more awake and GCS score improved. Before MRI patient was agitated so he required IV Versed and Lorazepam. Medicine team is asked to hospitalize the patient for further care. No fever or diarrhea or reported chest pain or SOB or tingling or numbness or slurred speech or focal weakness prior to unresponsiveness.      06/17/2025 pt admitted with hepatic encephalopathy, h/o significant ETOH use, drinks beer and whisky according to his mom.  Work up in ER essentially negative.    Pt apparently has 2 epic charts with different MRN,  [id number] and this chart [id number] in the original chart he has multiple admissions to [facility] in [place] for alcoholic cirrhosis, h/o esophageal banding and varicees back to 2018, ETOH use, Cocaine +, Marijuana + , Has reportedly seen Dr. Mon in the past, saw DR. Rodriguez in 2022 for varicees after being transferred to [facility] from [facility].  Pt with h/o Thrombocytopenia last 5/2025 was 23, received platelts in [place] at that time.  Mild Curly and anemia requiring transfusions in the past.  His ammonia was 150+ on admit here.    Review of Systems  Review of Systems   Unable to perform ROS: Mental status change         PSH and PMH: As in HPI    Social History     Social History Narrative    Not on file      Review of patient's allergies indicates:  No Known Allergies    Objective   BP (!) 140/88   Pulse 71   Temp 97.1 °F (36.2 °C) (Axillary)   Resp 15   Wt 81.2 kg (179 lb)   SpO2 100%       Physical Exam  Vitals and nursing note reviewed. Exam conducted with a chaperone present.   Constitutional:       General: He is not in acute distress.     Appearance: Normal appearance. He is normal weight. He is not ill-appearing.   Cardiovascular:      Rate and Rhythm: Normal rate and regular rhythm.      Pulses: Normal pulses.      Heart sounds: Normal heart sounds.   Pulmonary:      Effort: Pulmonary effort is normal.      Breath sounds: Normal breath sounds.   Abdominal:      General: Abdomen is flat.      Palpations: Abdomen is soft.   Musculoskeletal:      Right lower leg: No edema.      Left lower leg: No edema.   Skin:     General: Skin is warm and dry.      Findings: No erythema or rash.   Neurological:      General: No focal deficit present.      Mental Status: He is alert. Mental status is at baseline.   Psychiatric:         Mood and Affect: Mood normal.         Behavior: Behavior normal.         Thought Content: Thought content normal.           Assessment and Plan     Syncope     Hepatic encephalopathy        Likely polypharmacy due to Baclofen,Tizanidine and Trazodone as well     Thrombocytopenia due to cirrhosis  H/o GI bleed and esophageal varicees s/p banding 2018    Alcoholic Cirrhosis    Hypomagnesemia  Patient has Abnormal Magnesium: hypomagnesemia. Will continue to monitor electrolytes closely. Will replace the affected electrolytes and repeat labs to be done after interventions completed. The patient's magnesium results have been reviewed and are listed below.  Recent Labs   Lab 06/17/25  0406   MG 1.70*        Anemia  Anemia is likely due to chronic blood loss and Iron deficiency. Most recent hemoglobin and hematocrit are listed below.  Recent Labs     06/16/25  1317 06/17/25  0406   HGB 9.3* 9.0*   HCT 27.6* 27.1*     Plan  - Monitor serial CBC: Daily  - Transfuse PRBC if patient becomes hemodynamically unstable, symptomatic or H/H drops below 7/21.  - Patient has not received any PRBC transfusions to date  - Patient's anemia is currently stable  -         Fall precautions   Seizure precautions     Start Ativan 1mg po q 6 for DT prophylaxis  Continue Lactulose po  Will get hepatitis panel and liver US   Will check vitamin levels   PTOT consult   Will keep NPO until patient becomes fully alert.   Gentle IV fluid   UDS neg     CODE STATUS: Full Code. Information obtained from ER report.     Anticipate > 2 midnights stay, patient will be admitted as an inpatient for above mentioned reasons.      Reviewed Home medications, current medications and inpatient medications with patient.    Patient Screened for food insecurity, housing instability, transportation needs, utility difficulties, living conditions and interpersonal safety.   No needs identified.       Taisha Rucker MD  [facility]

## 2025-06-17 NOTE — NURSING
"Pt trying to get out of the bed,explained to pt that he is in the hospital and needs to stay in bed as he might fall. Pt extended his arm and pointed his finger at me and hollered,"yeah,I need to go",again I explained to him that he was in the hospital and we were his nurses here to help him. Pt continues to try and get out of bed,his eyes stayed half closed the whole time,pt appears weak/sluggish but speech is clear/loud.I went to get some ativan ivp to help pt relax while other nurse stayed w/pt. Ivp ativan given,bed alarm set,curtain open to keep visual on pt for his safety. Will attempt to give lactulose enema after pt calms down as pt unwilling to drink the lactulose.  "

## 2025-06-17 NOTE — ASSESSMENT & PLAN NOTE
The likely etiology of thrombocytopenia is liver disease. The patients 3 most recent labs are listed below.  Recent Labs     07/01/25  0447 07/02/25  0452 07/03/25  0501   PLT 39* 40* 40*     Plan  - Will transfuse if platelet count is <50k (if undergoing surgical procedure or have active bleeding).  -    Low but stable  No alcohol, no  nsaids

## 2025-06-17 NOTE — PLAN OF CARE
06/17/25 0933   Discharge Assessment   Assessment Type Discharge Planning Assessment   Confirmed/corrected address, phone number and insurance Yes   Confirmed Demographics Correct on Facesheet   Source of Information family   If unable to respond/provide information was family/caregiver contacted? Yes   Contact Name/Number Yong Townsend (father) [phone]   Communicated ARIANE with patient/caregiver Yes   People in Home parent(s)   Name(s) of People in Home mother Niyah, father Yong   Facility Arrived From: home   Do you expect to return to your current living situation? Yes   Do you have help at home or someone to help you manage your care at home? Yes   Who are your caregiver(s) and their phone number(s)? mother and father   Prior to hospitilization cognitive status: Alert/Oriented   Current cognitive status: Inappropriate Behavior   Walking or Climbing Stairs Difficulty no   Dressing/Bathing Difficulty no   Home Accessibility not wheelchair accessible   Equipment Currently Used at Home none   Readmission within 30 days? No   Patient currently being followed by outpatient case management? No   Do you currently have service(s) that help you manage your care at home? No   Do you take prescription medications? Yes   Do you have prescription coverage? Yes   Do you have any problems affording any of your prescribed medications? No   Is the patient taking medications as prescribed? yes   Who is going to help you get home at discharge? mother and father   How do you get to doctors appointments? car, drives self   Are you on dialysis? No   Do you take coumadin? No   Discharge Plan A Home with family   Discharge Plan B Home with family   DME Needed Upon Discharge  none   Discharge Plan discussed with: Parent(s)   Name(s) and Number(s) father Yong Townsend   Transition of Care Barriers Social   Physical Activity   On average, how many days per week do you engage in moderate to strenuous exercise (like a brisk  walk)? 0 days   On average, how many minutes do you engage in exercise at this level? 0 min   Financial Resource Strain   How hard is it for you to pay for the very basics like food, housing, medical care, and heating? Not very   Housing Stability   In the last 12 months, was there a time when you were not able to pay the mortgage or rent on time? N   In the past 12 months, how many times have you moved where you were living? 0   At any time in the past 12 months, were you homeless or living in a shelter (including now)? N   Transportation Needs   In the past 12 months, has lack of transportation kept you from medical appointments or from getting medications? no   In the past 12 months, has lack of transportation kept you from meetings, work, or from getting things needed for daily living? No   Food Insecurity   Within the past 12 months, you worried that your food would run out before you got the money to buy more. Never true   Within the past 12 months, the food you bought just didn't last and you didn't have money to get more. Never true   Stress   Do you feel stress - tense, restless, nervous, or anxious, or unable to sleep at night because your mind is troubled all the time - these days? Pt Unable   Social Isolation   How often do you feel lonely or isolated from those around you?  Patient unable to answer   Alcohol Use   Q1: How often do you have a drink containing alcohol? Pt Unable   Q2: How many drinks containing alcohol do you have on a typical day when you are drinking? Pt Unable   Q3: How often do you have six or more drinks on one occasion? Pt Unable   Utilities   In the past 12 months has the electric, gas, oil, or water company threatened to shut off services in your home? No   Health Literacy   How often do you need to have someone help you when you read instructions, pamphlets, or other written material from your doctor or pharmacy? Patient unable to respond

## 2025-06-18 LAB
ALBUMIN SERPL-MCNC: 2.7 G/DL (ref 3.4–5)
ALBUMIN/GLOB SERPL: 0.7 RATIO
ALP SERPL-CCNC: 84 UNIT/L (ref 50–144)
ALT SERPL-CCNC: 26 UNIT/L (ref 1–45)
ANION GAP SERPL CALC-SCNC: 2 MEQ/L (ref 2–13)
AST SERPL-CCNC: 55 UNIT/L (ref 17–59)
BASOPHILS # BLD AUTO: 0.02 X10(3)/MCL (ref 0.01–0.08)
BASOPHILS NFR BLD AUTO: 0.5 % (ref 0.1–1.2)
BILIRUB SERPL-MCNC: 6.4 MG/DL (ref 0–1)
BUN SERPL-MCNC: 11 MG/DL (ref 7–20)
CALCIUM SERPL-MCNC: 8.6 MG/DL (ref 8.4–10.2)
CHLORIDE SERPL-SCNC: 118 MMOL/L (ref 98–110)
CO2 SERPL-SCNC: 19 MMOL/L (ref 21–32)
COLOR STL: ABNORMAL
COLOR STL: NORMAL
CONSISTENCY STL: ABNORMAL
CONSISTENCY STL: NORMAL
CREAT SERPL-MCNC: 0.94 MG/DL (ref 0.66–1.25)
CREAT/UREA NIT SERPL: 12 (ref 12–20)
EOSINOPHIL # BLD AUTO: 0.14 X10(3)/MCL (ref 0.04–0.54)
EOSINOPHIL NFR BLD AUTO: 3.5 % (ref 0.7–7)
ERYTHROCYTE [DISTWIDTH] IN BLOOD BY AUTOMATED COUNT: 17.6 %
GFR SERPLBLD CREATININE-BSD FMLA CKD-EPI: >90 ML/MIN/1.73/M2
GLOBULIN SER-MCNC: 3.8 GM/DL (ref 2–3.9)
GLUCOSE SERPL-MCNC: 91 MG/DL (ref 70–115)
HCT VFR BLD AUTO: 25.2 % (ref 36–52)
HEMOCCULT SP2 STL QL: NEGATIVE
HEMOCCULT SP3 STL QL: POSITIVE
HGB BLD-MCNC: 8.3 G/DL (ref 13–18)
IMM GRANULOCYTES # BLD AUTO: 0.01 X10(3)/MCL (ref 0–0.03)
IMM GRANULOCYTES NFR BLD AUTO: 0.2 % (ref 0–0.5)
LYMPHOCYTES # BLD AUTO: 0.96 X10(3)/MCL (ref 1.32–3.57)
LYMPHOCYTES NFR BLD AUTO: 23.7 % (ref 20–55)
MAGNESIUM SERPL-MCNC: 1.7 MG/DL (ref 1.8–2.4)
MCH RBC QN AUTO: 30.5 PG (ref 27–34)
MCHC RBC AUTO-ENTMCNC: 32.9 G/DL (ref 31–37)
MCV RBC AUTO: 92.6 FL (ref 79–99)
MONOCYTES # BLD AUTO: 0.71 X10(3)/MCL (ref 0.3–0.82)
MONOCYTES NFR BLD AUTO: 17.5 % (ref 4.7–12.5)
NEUTROPHILS # BLD AUTO: 2.21 X10(3)/MCL (ref 1.78–5.38)
NEUTROPHILS NFR BLD AUTO: 54.6 % (ref 37–73)
PHOSPHATE SERPL-MCNC: 3.5 MG/DL (ref 2.5–4.9)
PLATELET # BLD AUTO: 46 X10(3)/MCL (ref 140–371)
PMV BLD AUTO: ABNORMAL FL
POTASSIUM SERPL-SCNC: 3.2 MMOL/L (ref 3.5–5.1)
PROT SERPL-MCNC: 6.5 GM/DL (ref 6.3–8.2)
RBC # BLD AUTO: 2.72 X10(6)/MCL (ref 4–6)
SODIUM SERPL-SCNC: 139 MMOL/L (ref 136–145)
WBC # BLD AUTO: 4.05 X10(3)/MCL (ref 4–11.5)

## 2025-06-18 PROCEDURE — 25000003 PHARM REV CODE 250

## 2025-06-18 PROCEDURE — 20000000 HC ICU ROOM

## 2025-06-18 PROCEDURE — 84100 ASSAY OF PHOSPHORUS: CPT | Performed by: HOSPITALIST

## 2025-06-18 PROCEDURE — 63600175 PHARM REV CODE 636 W HCPCS

## 2025-06-18 PROCEDURE — 36415 COLL VENOUS BLD VENIPUNCTURE: CPT | Performed by: HOSPITALIST

## 2025-06-18 PROCEDURE — 25000003 PHARM REV CODE 250: Performed by: HOSPITALIST

## 2025-06-18 PROCEDURE — 82272 OCCULT BLD FECES 1-3 TESTS: CPT | Performed by: FAMILY MEDICINE

## 2025-06-18 PROCEDURE — 83735 ASSAY OF MAGNESIUM: CPT | Performed by: HOSPITALIST

## 2025-06-18 PROCEDURE — 85025 COMPLETE CBC W/AUTO DIFF WBC: CPT | Performed by: HOSPITALIST

## 2025-06-18 PROCEDURE — 25000003 PHARM REV CODE 250: Performed by: FAMILY MEDICINE

## 2025-06-18 PROCEDURE — 63600175 PHARM REV CODE 636 W HCPCS: Performed by: FAMILY MEDICINE

## 2025-06-18 PROCEDURE — 11000001 HC ACUTE MED/SURG PRIVATE ROOM

## 2025-06-18 PROCEDURE — 80053 COMPREHEN METABOLIC PANEL: CPT | Performed by: HOSPITALIST

## 2025-06-18 PROCEDURE — 25000003 PHARM REV CODE 250: Performed by: INTERNAL MEDICINE

## 2025-06-18 RX ORDER — LANOLIN ALCOHOL/MO/W.PET/CERES
400 CREAM (GRAM) TOPICAL 2 TIMES DAILY
Status: DISCONTINUED | OUTPATIENT
Start: 2025-06-18 | End: 2025-06-29

## 2025-06-18 RX ORDER — THIAMINE HCL 100 MG
100 TABLET ORAL DAILY
Status: DISCONTINUED | OUTPATIENT
Start: 2025-06-18 | End: 2025-06-18

## 2025-06-18 RX ORDER — DEXMEDETOMIDINE HYDROCHLORIDE 4 UG/ML
0-1.4 INJECTION, SOLUTION INTRAVENOUS CONTINUOUS
Status: DISCONTINUED | OUTPATIENT
Start: 2025-06-18 | End: 2025-06-19

## 2025-06-18 RX ORDER — LORAZEPAM 0.5 MG/1
0.5 TABLET ORAL EVERY 6 HOURS
Status: DISCONTINUED | OUTPATIENT
Start: 2025-06-18 | End: 2025-06-18

## 2025-06-18 RX ORDER — MAGNESIUM SULFATE HEPTAHYDRATE 40 MG/ML
2 INJECTION, SOLUTION INTRAVENOUS ONCE
Status: COMPLETED | OUTPATIENT
Start: 2025-06-18 | End: 2025-06-18

## 2025-06-18 RX ORDER — LORAZEPAM 1 MG/1
1 TABLET ORAL EVERY 6 HOURS
Status: DISCONTINUED | OUTPATIENT
Start: 2025-06-18 | End: 2025-06-19

## 2025-06-18 RX ORDER — POTASSIUM CHLORIDE 7.45 MG/ML
20 INJECTION INTRAVENOUS ONCE
Status: COMPLETED | OUTPATIENT
Start: 2025-06-18 | End: 2025-06-18

## 2025-06-18 RX ORDER — POTASSIUM CHLORIDE 20 MEQ/1
40 TABLET, EXTENDED RELEASE ORAL DAILY
Status: DISCONTINUED | OUTPATIENT
Start: 2025-06-18 | End: 2025-07-03 | Stop reason: HOSPADM

## 2025-06-18 RX ORDER — LOSARTAN POTASSIUM 50 MG/1
50 TABLET ORAL DAILY
Status: DISCONTINUED | OUTPATIENT
Start: 2025-06-18 | End: 2025-07-03 | Stop reason: HOSPADM

## 2025-06-18 RX ORDER — ASPIRIN 325 MG
50000 TABLET, DELAYED RELEASE (ENTERIC COATED) ORAL WEEKLY
Status: DISCONTINUED | OUTPATIENT
Start: 2025-06-18 | End: 2025-07-03 | Stop reason: HOSPADM

## 2025-06-18 RX ORDER — THIAMINE HYDROCHLORIDE 100 MG/ML
400 INJECTION, SOLUTION INTRAMUSCULAR; INTRAVENOUS DAILY
Status: DISCONTINUED | OUTPATIENT
Start: 2025-06-19 | End: 2025-06-19

## 2025-06-18 RX ADMIN — POTASSIUM CHLORIDE 20 MEQ: 7.46 INJECTION, SOLUTION INTRAVENOUS at 05:06

## 2025-06-18 RX ADMIN — LACTULOSE 30 G: 20 SOLUTION ORAL at 08:06

## 2025-06-18 RX ADMIN — PROPRANOLOL HYDROCHLORIDE 20 MG: 20 TABLET ORAL at 08:06

## 2025-06-18 RX ADMIN — LOSARTAN POTASSIUM 50 MG: 50 TABLET, FILM COATED ORAL at 02:06

## 2025-06-18 RX ADMIN — LACTULOSE 30 G: 20 SOLUTION ORAL at 02:06

## 2025-06-18 RX ADMIN — LORAZEPAM 1 MG: 2 INJECTION INTRAMUSCULAR; INTRAVENOUS at 08:06

## 2025-06-18 RX ADMIN — THIAMINE HYDROCHLORIDE 100 MG: 100 INJECTION, SOLUTION INTRAMUSCULAR; INTRAVENOUS at 09:06

## 2025-06-18 RX ADMIN — LORAZEPAM 1 MG: 1 TABLET ORAL at 05:06

## 2025-06-18 RX ADMIN — LORAZEPAM 1 MG: 2 INJECTION INTRAMUSCULAR; INTRAVENOUS at 04:06

## 2025-06-18 RX ADMIN — FAMOTIDINE 20 MG: 10 INJECTION, SOLUTION INTRAVENOUS at 09:06

## 2025-06-18 RX ADMIN — FAMOTIDINE 20 MG: 10 INJECTION, SOLUTION INTRAVENOUS at 08:06

## 2025-06-18 RX ADMIN — LACTULOSE 30 G: 20 SOLUTION ORAL at 09:06

## 2025-06-18 RX ADMIN — PROPRANOLOL HYDROCHLORIDE 20 MG: 20 TABLET ORAL at 09:06

## 2025-06-18 RX ADMIN — DEXMEDETOMIDINE HYDROCHLORIDE 0.2 MCG/KG/HR: 400 INJECTION INTRAVENOUS at 08:06

## 2025-06-18 RX ADMIN — MUPIROCIN: 20 OINTMENT TOPICAL at 09:06

## 2025-06-18 RX ADMIN — POTASSIUM CHLORIDE 40 MEQ: 1500 TABLET, EXTENDED RELEASE ORAL at 02:06

## 2025-06-18 RX ADMIN — Medication 400 MG: at 02:06

## 2025-06-18 RX ADMIN — THERA TABS 1 TABLET: TAB at 02:06

## 2025-06-18 RX ADMIN — Medication 400 MG: at 08:06

## 2025-06-18 RX ADMIN — Medication 50000 UNITS: at 02:06

## 2025-06-18 RX ADMIN — SODIUM CHLORIDE: 9 INJECTION, SOLUTION INTRAVENOUS at 02:06

## 2025-06-18 RX ADMIN — LORAZEPAM 1 MG: 2 INJECTION INTRAMUSCULAR; INTRAVENOUS at 12:06

## 2025-06-18 RX ADMIN — MUPIROCIN: 20 OINTMENT TOPICAL at 08:06

## 2025-06-18 RX ADMIN — FOLIC ACID: 5 INJECTION, SOLUTION INTRAMUSCULAR; INTRAVENOUS; SUBCUTANEOUS at 05:06

## 2025-06-18 RX ADMIN — MAGNESIUM SULFATE HEPTAHYDRATE 2 G: 40 INJECTION, SOLUTION INTRAVENOUS at 05:06

## 2025-06-18 RX ADMIN — LORAZEPAM 0.5 MG: 0.5 TABLET ORAL at 12:06

## 2025-06-18 NOTE — PLAN OF CARE
06/18/25 1240   Discharge Reassessment   Assessment Type Discharge Planning Reassessment   Did the patient's condition or plan change since previous assessment? No   Discharge Plan discussed with: Patient;Parent(s)   Name(s) and Number(s) Yong Townsend-Father  [phone]   Communicated ARIANE with patient/caregiver Date not available/Unable to determine   Discharge Plan A Home with family   Discharge Plan B Psychiatric hospital   DME Needed Upon Discharge  none   Transition of Care Barriers Does not adhere to care plan;Social;Substance Abuse   Why the patient remains in the hospital Requires continued medical care   Post-Acute Status   Discharge Delays None known at this time

## 2025-06-18 NOTE — PLAN OF CARE
Problem: Adult Inpatient Plan of Care  Goal: Plan of Care Review  Outcome: Progressing  Goal: Absence of Hospital-Acquired Illness or Injury  Outcome: Progressing  Goal: Optimal Comfort and Wellbeing  Outcome: Progressing  Goal: Readiness for Transition of Care  Outcome: Progressing     Problem: Delirium  Goal: Optimal Coping  Outcome: Progressing  Goal: Improved Behavioral Control  Outcome: Progressing  Goal: Improved Attention and Thought Clarity  Outcome: Progressing     Problem: Fall Injury Risk  Goal: Absence of Fall and Fall-Related Injury  Outcome: Progressing     Problem: Restraint, Nonviolent  Goal: Absence of Harm or Injury  Outcome: Progressing

## 2025-06-18 NOTE — NURSING
Pt had bm and needed to be cleaned up,also ext. Cath leaking and needed to be changed,pt fussing stating to leave him alone,after repeatedly explaining that we could not leave him dirty,pt calmed down somewhat and allowed us to change/clean him. After cleaning up pt asked for bananas,I told him I did not have this but had jello,pudding,etc.;brought pt some orange jello which he ate after taking his night meds,pt also asked for remote,placed remote by pt and assisted him in finding a channel to his liking,left pt sitting up in bed,watching tv,bed alarm set.

## 2025-06-18 NOTE — PLAN OF CARE
Problem: Adult Inpatient Plan of Care  Goal: Plan of Care Review  Outcome: Not Progressing  Goal: Patient-Specific Goal (Individualized)  Outcome: Not Progressing  Goal: Absence of Hospital-Acquired Illness or Injury  Outcome: Not Progressing  Goal: Optimal Comfort and Wellbeing  Outcome: Not Progressing  Goal: Readiness for Transition of Care  Outcome: Not Progressing     Problem: Delirium  Goal: Optimal Coping  Outcome: Not Progressing  Goal: Improved Behavioral Control  Outcome: Not Progressing  Goal: Improved Attention and Thought Clarity  Outcome: Not Progressing  Goal: Improved Sleep  Outcome: Not Progressing     Problem: Fall Injury Risk  Goal: Absence of Fall and Fall-Related Injury  Outcome: Progressing     Problem: Restraint, Nonviolent  Goal: Absence of Harm or Injury  Outcome: Not Progressing      Arrived via stretcher to room OBS 10 ambulated to bed gait steady. Denies pain or SOB at this time. BBS noted to be Clear in all fields. Denies abd pain +BS x4 quad. AAOx4 Neuro intact. All questions answered and call light in reach bed in low position with lock set and alarm on verbalizes need to call prn for assist.

## 2025-06-18 NOTE — PROGRESS NOTES
HOSPITAL MEDICINE - ADMISSION NOTE    Chief Complaint  Unresponsiveness     History of Present Illness     This is a 60 years old male patient with unknown PMH but taking Trazodone, Baclofen, and Tizanidine was brought to the emergency room to be evaluated for unresponsiveness. Patient is currently pleasantly confused, spontaneously moving extremities in the bed but not following command verbally. Entire history is obtained from emergency and nursing report. I did try to reach to patients mother but unable to reach. As per the report patient was at [facility] this morning and went for the washroom around 9. His mother found him unresponsive in the washroom around 10:30 but for some reason she did not call 911 until 1:30 PM. She thought that patient will be responsive, but he did not so 911 was eventually called. On arrival CT scan of the head, CTA of the head and neck and MRI of the brain were obtained. Neurologist was also consulted. MRI of the brain is negative for any acute infarct, CT head is also negative for any acute findings as well as CT of the head and neck. Initial workup showed ammonia level of 166, hemoglobin 9.3, platelet count 75, sodium 141, potassium 3.9, and dimer of 0.85. Hie did get CTA head and neck so CTA chest was not obtained. On arrival patient's GCS level was only 8 and was only responsive to painful stimuli. During the course of the emergency room he has become more awake and GCS score improved. Before MRI patient was agitated so he required IV Versed and Lorazepam. Medicine team is asked to hospitalize the patient for further care. No fever or diarrhea or reported chest pain or SOB or tingling or numbness or slurred speech or focal weakness prior to unresponsiveness.      06/17/2025 pt admitted with hepatic encephalopathy, h/o significant ETOH use, drinks beer and whisky according to his mom.  Work up in ER essentially negative.    Pt apparently has 2 epic charts with different MRN,  [id number] and this chart [id number] in the original chart he has multiple admissions to [facility] in [place] for alcoholic cirrhosis, h/o esophageal banding and varicees back to 2018, ETOH use, Cocaine +, Marijuana + , Has reportedly seen Dr. Mon in the past, saw DR. Rodriguez in 2022 for varicees after being transferred to [facility] from [facility].  Pt with h/o Thrombocytopenia last 5/2025 was 23, received platelts in [place] at that time.  Mild Curly and anemia requiring transfusions in the past.  His ammonia was 150+ on admit here.  06/18/2025 pt more awake this am, improving mental staus, more sleepy this am  Review of Systems  Review of Systems   Unable to perform ROS: Mental status change         PSH and PMH: As in HPI    Social History     Social History Narrative    Not on file      Review of patient's allergies indicates:  No Known Allergies    Objective   BP (!) 142/83   Pulse 64   Temp 97.8 °F (36.6 °C) (Axillary)   Resp 13   Wt 76.3 kg (168 lb 3.4 oz)   SpO2 100%       Physical Exam  Vitals and nursing note reviewed. Exam conducted with a chaperone present.   Constitutional:       General: He is not in acute distress.     Appearance: Normal appearance. He is normal weight. He is not ill-appearing.   Cardiovascular:      Rate and Rhythm: Normal rate and regular rhythm.      Pulses: Normal pulses.      Heart sounds: Normal heart sounds.   Pulmonary:      Effort: Pulmonary effort is normal.      Breath sounds: Normal breath sounds.   Abdominal:      General: Abdomen is flat.      Palpations: Abdomen is soft.   Musculoskeletal:      Right lower leg: No edema.      Left lower leg: No edema.   Skin:     General: Skin is warm and dry.      Findings: No erythema or rash.   Neurological:      General: No focal deficit present.      Mental Status: He is alert. Mental status is at baseline.   Psychiatric:         Mood and Affect: Mood normal.         Behavior: Behavior normal.         Thought Content: Thought  content normal.           Assessment and Plan     Syncope     Hepatic encephalopathy  recheck ammonia level in am     Likely polypharmacy due to Baclofen,Tizanidine and Trazodone as well these meds are being held    Thrombocytopenia due to cirrhosis  The likely etiology of thrombocytopenia is liver disease. The patients 3 most recent labs are listed below.  Recent Labs     06/16/25  1317 06/17/25  0406 06/18/25  0348   PLT 75* 61* 46*     Plan  - Will transfuse if platelet count is <50k (if undergoing surgical procedure or have active bleeding).  -       H/o GI bleed and esophageal varicees s/p banding 2018    Alcoholic Cirrhosis    Hypomagnesemia  Patient has Abnormal Magnesium: hypomagnesemia. Will continue to monitor electrolytes closely. Will replace the affected electrolytes and repeat labs to be done after interventions completed. The patient's magnesium results have been reviewed and are listed below.  Recent Labs   Lab 06/18/25  0348   MG 1.70*   Give another 2gm mag     Anemia  Anemia is likely due to chronic blood loss and Iron deficiency. Most recent hemoglobin and hematocrit are listed below.  Recent Labs     06/16/25  1317 06/17/25  0406 06/18/25  0348   HGB 9.3* 9.0* 8.3*   HCT 27.6* 27.1* 25.2*     Plan  - Monitor serial CBC: Daily  - Transfuse PRBC if patient becomes hemodynamically unstable, symptomatic or H/H drops below 7/21.  - Patient has not received any PRBC transfusions to date  - Patient's anemia is currently stable  -  check irone and b12, folate levels  HTN  Patients blood pressure range in the last 24 hours was: BP  Min: 95/63  Max: 200/115.The patient's inpatient anti-hypertensive regimen is listed below:  Current Antihypertensives  , 2 times daily, Oral  propranoloL tablet 20 mg, 2 times daily, Oral  labetalol 20 mg/4 mL (5 mg/mL) IV syring, Every 4 hours PRN, Intravenous  hydrALAZINE injection 10 mg, Every 6 hours PRN, Intravenous  cloNIDine tablet 0.2 mg, Every 6 hours PRN,  Oral  losartan tablet 50 mg, Daily, Oral    Plan  - BP is uncontrolled, will adjust as follows: add losartan 50mg daily  -         Fall precautions   Seizure precautions     Decrease ativan to 0.5mg q 6 hrs  Continue Lactulose po TID  Rechek ammonia level in am   PTOT consult   Will keep NPO until patient becomes fully alert.   Gentle IV fluid   UDS neg     CODE STATUS: Full Code. Information obtained from ER report.     Anticipate > 2 midnights stay, patient will be admitted as an inpatient for above mentioned reasons.      Reviewed Home medications, current medications and inpatient medications with patient.    Patient Screened for food insecurity, housing instability, transportation needs, utility difficulties, living conditions and interpersonal safety.   No needs identified.          Taisha Rucker MD  [facility]

## 2025-06-18 NOTE — NURSING
Pt had large bm,smeared all over him and bed,explained to pt that we would need to clean him,pt stated no, pt stated he wanted water,explained to pt that I would give him some water after we cleaned him and the bed/monitors,pt again stated no and he wanted water,started cleaning pt,pt began physically fighting w/staff and resisting care,after finally getting pt and surrounding area cleaned,sat hob up and gave pt some water,pt relaxed and going to sleep by the time we left the room.

## 2025-06-19 LAB
ALBUMIN SERPL-MCNC: 2.2 G/DL (ref 3.4–5)
ALBUMIN/GLOB SERPL: 0.6 RATIO
ALP SERPL-CCNC: 78 UNIT/L (ref 50–144)
ALT SERPL-CCNC: 20 UNIT/L (ref 1–45)
AMMONIA PLAS-MSCNC: 32 UMOL/L (ref 11–32)
ANION GAP SERPL CALC-SCNC: 1 MEQ/L (ref 2–13)
ANISOCYTOSIS BLD QL SMEAR: ABNORMAL
AST SERPL-CCNC: 47 UNIT/L (ref 17–59)
BASOPHILS # BLD AUTO: 0.01 X10(3)/MCL (ref 0.01–0.08)
BASOPHILS NFR BLD AUTO: 0.4 % (ref 0.1–1.2)
BILIRUB SERPL-MCNC: 3.5 MG/DL (ref 0–1)
BUN SERPL-MCNC: 9 MG/DL (ref 7–20)
CALCIUM SERPL-MCNC: 8.7 MG/DL (ref 8.4–10.2)
CHLORIDE SERPL-SCNC: 119 MMOL/L (ref 98–110)
CO2 SERPL-SCNC: 19 MMOL/L (ref 21–32)
CREAT SERPL-MCNC: 1.05 MG/DL (ref 0.66–1.25)
CREAT/UREA NIT SERPL: 9 (ref 12–20)
ELLIPTOCYTOSIS (OHS): ABNORMAL
EOSINOPHIL # BLD AUTO: 0.1 X10(3)/MCL (ref 0.04–0.54)
EOSINOPHIL NFR BLD AUTO: 4.4 % (ref 0.7–7)
ERYTHROCYTE [DISTWIDTH] IN BLOOD BY AUTOMATED COUNT: 17.4 %
FERRITIN SERPL-MCNC: 90 NG/ML (ref 21.81–274.66)
FOLATE SERPL-MCNC: >20 NG/ML (ref 2.8–20)
GFR SERPLBLD CREATININE-BSD FMLA CKD-EPI: 81 ML/MIN/1.73/M2
GLOBULIN SER-MCNC: 3.5 GM/DL (ref 2–3.9)
GLUCOSE SERPL-MCNC: 96 MG/DL (ref 70–115)
HAV IGM SERPL QL IA: NONREACTIVE
HBV CORE IGM SERPL QL IA: NONREACTIVE
HBV SURFACE AG SERPL QL IA: NONREACTIVE
HCT VFR BLD AUTO: 23 % (ref 36–52)
HCV AB SERPL QL IA: NONREACTIVE
HGB BLD-MCNC: 7.5 G/DL (ref 13–18)
HIV 1+2 AB+HIV1 P24 AG SERPL QL IA: NONREACTIVE
IMM GRANULOCYTES # BLD AUTO: 0.01 X10(3)/MCL (ref 0–0.03)
IMM GRANULOCYTES NFR BLD AUTO: 0.4 % (ref 0–0.5)
IRON SATN MFR SERPL: 27 % (ref 20–50)
IRON SERPL-MCNC: 71 UG/DL (ref 65–175)
LYMPHOCYTES # BLD AUTO: 0.77 X10(3)/MCL (ref 1.32–3.57)
LYMPHOCYTES NFR BLD AUTO: 33.6 % (ref 20–55)
MAGNESIUM SERPL-MCNC: 1.6 MG/DL (ref 1.8–2.4)
MCH RBC QN AUTO: 30.1 PG (ref 27–34)
MCHC RBC AUTO-ENTMCNC: 32.6 G/DL (ref 31–37)
MCV RBC AUTO: 92.4 FL (ref 79–99)
MONOCYTES # BLD AUTO: 0.39 X10(3)/MCL (ref 0.3–0.82)
MONOCYTES NFR BLD AUTO: 17 % (ref 4.7–12.5)
NEUTROPHILS # BLD AUTO: 1.01 X10(3)/MCL (ref 1.78–5.38)
NEUTROPHILS NFR BLD AUTO: 44.2 % (ref 37–73)
PHOSPHATE SERPL-MCNC: 4.2 MG/DL (ref 2.5–4.9)
PLATELET # BLD AUTO: 36 X10(3)/MCL (ref 140–371)
PLATELET # BLD EST: ABNORMAL 10*3/UL
PMV BLD AUTO: ABNORMAL FL
POIKILOCYTOSIS BLD QL SMEAR: ABNORMAL
POTASSIUM SERPL-SCNC: 3.7 MMOL/L (ref 3.5–5.1)
PROT SERPL-MCNC: 5.7 GM/DL (ref 6.3–8.2)
RBC # BLD AUTO: 2.49 X10(6)/MCL (ref 4–6)
RBC MORPH BLD: ABNORMAL
SODIUM SERPL-SCNC: 139 MMOL/L (ref 136–145)
TIBC SERPL-MCNC: 195 UG/DL (ref 60–240)
TIBC SERPL-MCNC: 266 UG/DL (ref 250–450)
TRANSFERRIN SERPL-MCNC: 232 MG/DL (ref 174–364)
VIT B12 SERPL-MCNC: >2000 PG/ML (ref 213–816)
WBC # BLD AUTO: 2.29 X10(3)/MCL (ref 4–11.5)

## 2025-06-19 PROCEDURE — 63600175 PHARM REV CODE 636 W HCPCS: Performed by: HOSPITALIST

## 2025-06-19 PROCEDURE — 84100 ASSAY OF PHOSPHORUS: CPT | Performed by: HOSPITALIST

## 2025-06-19 PROCEDURE — 85025 COMPLETE CBC W/AUTO DIFF WBC: CPT | Performed by: HOSPITALIST

## 2025-06-19 PROCEDURE — 25000003 PHARM REV CODE 250

## 2025-06-19 PROCEDURE — 20000000 HC ICU ROOM

## 2025-06-19 PROCEDURE — 25000003 PHARM REV CODE 250: Performed by: FAMILY MEDICINE

## 2025-06-19 PROCEDURE — 82746 ASSAY OF FOLIC ACID SERUM: CPT | Performed by: FAMILY MEDICINE

## 2025-06-19 PROCEDURE — 87389 HIV-1 AG W/HIV-1&-2 AB AG IA: CPT | Performed by: FAMILY MEDICINE

## 2025-06-19 PROCEDURE — 80074 ACUTE HEPATITIS PANEL: CPT | Performed by: FAMILY MEDICINE

## 2025-06-19 PROCEDURE — 51702 INSERT TEMP BLADDER CATH: CPT

## 2025-06-19 PROCEDURE — 83735 ASSAY OF MAGNESIUM: CPT | Performed by: HOSPITALIST

## 2025-06-19 PROCEDURE — 63600175 PHARM REV CODE 636 W HCPCS: Performed by: FAMILY MEDICINE

## 2025-06-19 PROCEDURE — 80053 COMPREHEN METABOLIC PANEL: CPT | Performed by: HOSPITALIST

## 2025-06-19 PROCEDURE — 63600175 PHARM REV CODE 636 W HCPCS: Performed by: INTERNAL MEDICINE

## 2025-06-19 PROCEDURE — 82140 ASSAY OF AMMONIA: CPT | Performed by: FAMILY MEDICINE

## 2025-06-19 PROCEDURE — 63600175 PHARM REV CODE 636 W HCPCS

## 2025-06-19 RX ORDER — MAGNESIUM SULFATE HEPTAHYDRATE 40 MG/ML
2 INJECTION, SOLUTION INTRAVENOUS ONCE
Status: COMPLETED | OUTPATIENT
Start: 2025-06-19 | End: 2025-06-19

## 2025-06-19 RX ORDER — THIAMINE HYDROCHLORIDE 100 MG/ML
400 INJECTION, SOLUTION INTRAMUSCULAR; INTRAVENOUS EVERY 8 HOURS
Status: COMPLETED | OUTPATIENT
Start: 2025-06-19 | End: 2025-06-21

## 2025-06-19 RX ORDER — HALOPERIDOL LACTATE 5 MG/ML
5 INJECTION, SOLUTION INTRAMUSCULAR EVERY 6 HOURS PRN
Status: DISCONTINUED | OUTPATIENT
Start: 2025-06-19 | End: 2025-06-27

## 2025-06-19 RX ORDER — ZIPRASIDONE MESYLATE 20 MG/ML
20 INJECTION, POWDER, LYOPHILIZED, FOR SOLUTION INTRAMUSCULAR EVERY 12 HOURS PRN
Status: DISCONTINUED | OUTPATIENT
Start: 2025-06-19 | End: 2025-07-03 | Stop reason: HOSPADM

## 2025-06-19 RX ORDER — LORAZEPAM 2 MG/ML
2 INJECTION INTRAMUSCULAR
Status: DISCONTINUED | OUTPATIENT
Start: 2025-06-19 | End: 2025-06-20

## 2025-06-19 RX ORDER — LORAZEPAM 1 MG/1
2 TABLET ORAL EVERY 4 HOURS
Status: DISCONTINUED | OUTPATIENT
Start: 2025-06-19 | End: 2025-06-20

## 2025-06-19 RX ORDER — THIAMINE HCL 100 MG
100 TABLET ORAL EVERY 8 HOURS
Status: DISCONTINUED | OUTPATIENT
Start: 2025-06-21 | End: 2025-07-03 | Stop reason: HOSPADM

## 2025-06-19 RX ADMIN — LORAZEPAM 1 MG: 2 INJECTION INTRAMUSCULAR; INTRAVENOUS at 07:06

## 2025-06-19 RX ADMIN — HALOPERIDOL LACTATE 5 MG: 5 INJECTION, SOLUTION INTRAMUSCULAR at 07:06

## 2025-06-19 RX ADMIN — LOSARTAN POTASSIUM 50 MG: 50 TABLET, FILM COATED ORAL at 10:06

## 2025-06-19 RX ADMIN — LORAZEPAM 2 MG: 2 INJECTION INTRAMUSCULAR; INTRAVENOUS at 05:06

## 2025-06-19 RX ADMIN — Medication 400 MG: at 08:06

## 2025-06-19 RX ADMIN — MAGNESIUM SULFATE HEPTAHYDRATE 2 G: 40 INJECTION, SOLUTION INTRAVENOUS at 09:06

## 2025-06-19 RX ADMIN — PROPRANOLOL HYDROCHLORIDE 20 MG: 20 TABLET ORAL at 08:06

## 2025-06-19 RX ADMIN — HYDRALAZINE HYDROCHLORIDE 10 MG: 20 INJECTION INTRAMUSCULAR; INTRAVENOUS at 11:06

## 2025-06-19 RX ADMIN — POTASSIUM CHLORIDE 40 MEQ: 1500 TABLET, EXTENDED RELEASE ORAL at 09:06

## 2025-06-19 RX ADMIN — LORAZEPAM 2 MG: 1 TABLET ORAL at 02:06

## 2025-06-19 RX ADMIN — LORAZEPAM 2 MG: 2 INJECTION INTRAMUSCULAR; INTRAVENOUS at 10:06

## 2025-06-19 RX ADMIN — FAMOTIDINE 20 MG: 10 INJECTION, SOLUTION INTRAVENOUS at 08:06

## 2025-06-19 RX ADMIN — LORAZEPAM 2 MG: 1 TABLET ORAL at 09:06

## 2025-06-19 RX ADMIN — MUPIROCIN: 20 OINTMENT TOPICAL at 08:06

## 2025-06-19 RX ADMIN — ASCORBIC ACID, VITAMIN A PALMITATE, CHOLECALCIFEROL, THIAMINE HYDROCHLORIDE, RIBOFLAVIN-5 PHOSPHATE SODIUM, PYRIDOXINE HYDROCHLORIDE, NIACINAMIDE, DEXPANTHENOL, ALPHA-TOCOPHEROL ACETATE, VITAMIN K1, FOLIC ACID, BIOTIN, CYANOCOBALAMIN: 200; 3300; 200; 6; 3.6; 6; 40; 15; 10; 150; 600; 60; 5 INJECTION, SOLUTION INTRAVENOUS at 10:06

## 2025-06-19 RX ADMIN — MUPIROCIN: 20 OINTMENT TOPICAL at 10:06

## 2025-06-19 RX ADMIN — Medication 400 MG: at 10:06

## 2025-06-19 RX ADMIN — THIAMINE HYDROCHLORIDE 400 MG: 100 INJECTION, SOLUTION INTRAMUSCULAR; INTRAVENOUS at 09:06

## 2025-06-19 RX ADMIN — LORAZEPAM 2 MG: 2 INJECTION INTRAMUSCULAR; INTRAVENOUS at 07:06

## 2025-06-19 RX ADMIN — FAMOTIDINE 20 MG: 10 INJECTION, SOLUTION INTRAVENOUS at 09:06

## 2025-06-19 RX ADMIN — LORAZEPAM 2 MG: 1 TABLET ORAL at 08:06

## 2025-06-19 RX ADMIN — PROPRANOLOL HYDROCHLORIDE 20 MG: 20 TABLET ORAL at 09:06

## 2025-06-19 RX ADMIN — THIAMINE HYDROCHLORIDE 400 MG: 100 INJECTION, SOLUTION INTRAMUSCULAR; INTRAVENOUS at 02:06

## 2025-06-19 NOTE — NURSING
Patient pulling on restraints, attempting to get out of bed, precedex started per order, will continue to monitor

## 2025-06-19 NOTE — PLAN OF CARE
Problem: Adult Inpatient Plan of Care  Goal: Plan of Care Review  Outcome: Not Progressing  Goal: Patient-Specific Goal (Individualized)  Outcome: Not Progressing  Goal: Absence of Hospital-Acquired Illness or Injury  Outcome: Not Progressing  Goal: Optimal Comfort and Wellbeing  Outcome: Not Progressing  Goal: Readiness for Transition of Care  Outcome: Not Progressing     Problem: Delirium  Goal: Optimal Coping  Outcome: Not Progressing  Goal: Improved Behavioral Control  Outcome: Not Progressing  Goal: Improved Attention and Thought Clarity  Outcome: Not Progressing  Goal: Improved Sleep  Outcome: Not Progressing     Problem: Fall Injury Risk  Goal: Absence of Fall and Fall-Related Injury  Outcome: Progressing     Problem: Restraint, Nonviolent  Goal: Absence of Harm or Injury  Outcome: Not Progressing     Problem: Infection  Goal: Absence of Infection Signs and Symptoms  Outcome: Progressing

## 2025-06-19 NOTE — NURSING
Upon arrival, patient pulled off telemetry, pulling on restraints, attempting to get out of bed, external catheter reapplied, attempted reorientation with patient , confused to place time and situation

## 2025-06-19 NOTE — PROGRESS NOTES
HOSPITAL MEDICINE - ADMISSION NOTE    Chief Complaint  Unresponsiveness     History of Present Illness     This is a 60 years old male patient with unknown PMH but taking Trazodone, Baclofen, and Tizanidine was brought to the emergency room to be evaluated for unresponsiveness. Patient is currently pleasantly confused, spontaneously moving extremities in the bed but not following command verbally. Entire history is obtained from emergency and nursing report. I did try to reach to patients mother but unable to reach. As per the report patient was at [facility] this morning and went for the washroom around 9. His mother found him unresponsive in the washroom around 10:30 but for some reason she did not call 911 until 1:30 PM. She thought that patient will be responsive, but he did not so 911 was eventually called. On arrival CT scan of the head, CTA of the head and neck and MRI of the brain were obtained. Neurologist was also consulted. MRI of the brain is negative for any acute infarct, CT head is also negative for any acute findings as well as CT of the head and neck. Initial workup showed ammonia level of 166, hemoglobin 9.3, platelet count 75, sodium 141, potassium 3.9, and dimer of 0.85. Hie did get CTA head and neck so CTA chest was not obtained. On arrival patient's GCS level was only 8 and was only responsive to painful stimuli. During the course of the emergency room he has become more awake and GCS score improved. Before MRI patient was agitated so he required IV Versed and Lorazepam. Medicine team is asked to hospitalize the patient for further care. No fever or diarrhea or reported chest pain or SOB or tingling or numbness or slurred speech or focal weakness prior to unresponsiveness.      06/17/2025 pt admitted with hepatic encephalopathy, h/o significant ETOH use, drinks beer and whisky according to his mom.  Work up in ER essentially negative.    Pt apparently has 2 epic charts with different MRN,  [id number] and this chart [id number] in the original chart he has multiple admissions to [facility] in [place] for alcoholic cirrhosis, h/o esophageal banding and varicees back to 2018, ETOH use, Cocaine +, Marijuana + , Has reportedly seen Dr. Mon in the past, saw DR. Rodriguez in 2022 for varicees after being transferred to [facility] from [facility].  Pt with h/o Thrombocytopenia last 5/2025 was 23, received platelts in [place] at that time.  Mild Curly and anemia requiring transfusions in the past.  His ammonia was 150+ on admit here.  06/18/2025 pt more awake this am, improving mental staus, more sleepy this am  06/19/2025 pt with increased aggitation started mid morning yesterday back on iv ativan needed some precedex overnight due to combative behaviors, currently resting off precedex received iv ativan, on high dose thiamine and MVI iv bannana bags    Review of Systems  Review of Systems   Unable to perform ROS: Mental status change         PSH and PMH: As in HPI    Social History     Social History Narrative    Not on file      Review of patient's allergies indicates:  No Known Allergies    Objective   BP (!) 142/85   Pulse 77   Temp 97.3 °F (36.3 °C) (Temporal)   Resp 18   Wt 79.4 kg (175 lb 0.7 oz)   SpO2 100%       Physical Exam  Vitals and nursing note reviewed. Exam conducted with a chaperone present.   Constitutional:       General: He is not in acute distress.     Appearance: Normal appearance. He is normal weight. He is not ill-appearing.   Cardiovascular:      Rate and Rhythm: Normal rate and regular rhythm.      Pulses: Normal pulses.      Heart sounds: Normal heart sounds.   Pulmonary:      Effort: Pulmonary effort is normal.      Breath sounds: Normal breath sounds.   Abdominal:      General: Abdomen is flat.      Palpations: Abdomen is soft.   Musculoskeletal:      Right lower leg: No edema.      Left lower leg: No edema.   Skin:     General: Skin is warm and dry.      Findings: No erythema or  rash.      Comments: Multiple areas of old scarred on LE, unsure etiology, no open areas or redness or bleeding   Neurological:      General: No focal deficit present.      Mental Status: He is alert. Mental status is at baseline.   Psychiatric:         Mood and Affect: Mood normal.         Behavior: Behavior normal.           Assessment and Plan     Syncope     Hepatic encephalopathy  recheck ammonia level in am     Likely polypharmacy due to Baclofen,Tizanidine and Trazodone as well these meds are being held    Thrombocytopenia due to cirrhosis  The likely etiology of thrombocytopenia is liver disease. The patients 3 most recent labs are listed below.  Recent Labs     06/17/25  0406 06/18/25  0348 06/19/25  0440   PLT 61* 46* 36*     Plan  - Will transfuse if platelet count is <50k (if undergoing surgical procedure or have active bleeding).  -       H/o GI bleed and esophageal varicees s/p banding 2018    Alcoholic Cirrhosis continue ativan iv, precedex if needed  No seizures or h/o seizures per family  Continue thimine and MVI    Hypomagnesemia  Patient has Abnormal Magnesium: hypomagnesemia. Will continue to monitor electrolytes closely. Will replace the affected electrolytes and repeat labs to be done after interventions completed. The patient's magnesium results have been reviewed and are listed below.  Recent Labs   Lab 06/19/25  0440   MG 1.60*   Give another 2gm mag this am    Anemia  Anemia is likely due to chronic blood loss and Iron deficiency. Most recent hemoglobin and hematocrit are listed below.  Recent Labs     06/17/25  0406 06/18/25  0348 06/19/25  0440   HGB 9.0* 8.3* 7.5*   HCT 27.1* 25.2* 23.0*     Plan  - Monitor serial CBC: Daily  - Transfuse PRBC if patient becomes hemodynamically unstable, symptomatic or H/H drops below 7/21.  - Patient has not received any PRBC transfusions to date  - Patient's anemia is currently stable  -  check irone and b12, folate levels  Known h/o esophageal  varicees  Occult +      HTN  Patients blood pressure range in the last 24 hours was: BP  Min: 68/46  Max: 200/115.The patient's inpatient anti-hypertensive regimen is listed below:  Current Antihypertensives  , 2 times daily, Oral  propranoloL tablet 20 mg, 2 times daily, Oral  labetalol 20 mg/4 mL (5 mg/mL) IV syring, Every 4 hours PRN, Intravenous  hydrALAZINE injection 10 mg, Every 6 hours PRN, Intravenous  cloNIDine tablet 0.2 mg, Every 6 hours PRN, Oral  losartan tablet 50 mg, Daily, Oral    Plan  - BP is uncontrolled, will adjust as follows: add losartan 50mg daily  -         Fall precautions   Seizure precautions     Decrease ativan to 0.5mg q 6 hrs  Continue Lactulose po TID  Rechek ammonia level in am   PTOT consult   Will keep NPO until patient becomes fully alert.   Gentle IV fluid   UDS neg     CODE STATUS: Full Code. Information obtained from ER report.     Anticipate > 2 midnights stay, patient will be admitted as an inpatient for above mentioned reasons.      Reviewed Home medications, current medications and inpatient medications with patient.    Patient Screened for food insecurity, housing instability, transportation needs, utility difficulties, living conditions and interpersonal safety.   No needs identified.          Taisha Rucker MD  [facility]

## 2025-06-20 PROCEDURE — 63600175 PHARM REV CODE 636 W HCPCS

## 2025-06-20 PROCEDURE — 25000003 PHARM REV CODE 250: Performed by: FAMILY MEDICINE

## 2025-06-20 PROCEDURE — 25000003 PHARM REV CODE 250

## 2025-06-20 PROCEDURE — 63600175 PHARM REV CODE 636 W HCPCS: Performed by: FAMILY MEDICINE

## 2025-06-20 PROCEDURE — 20000000 HC ICU ROOM

## 2025-06-20 PROCEDURE — 63600175 PHARM REV CODE 636 W HCPCS: Performed by: INTERNAL MEDICINE

## 2025-06-20 RX ORDER — LORAZEPAM 2 MG/ML
2 INJECTION INTRAMUSCULAR
Status: DISCONTINUED | OUTPATIENT
Start: 2025-06-20 | End: 2025-06-23

## 2025-06-20 RX ORDER — QUETIAPINE FUMARATE 100 MG/1
100 TABLET, FILM COATED ORAL 2 TIMES DAILY
Status: DISCONTINUED | OUTPATIENT
Start: 2025-06-20 | End: 2025-06-22

## 2025-06-20 RX ADMIN — FAMOTIDINE 20 MG: 10 INJECTION, SOLUTION INTRAVENOUS at 08:06

## 2025-06-20 RX ADMIN — LORAZEPAM 2 MG: 2 INJECTION INTRAMUSCULAR; INTRAVENOUS at 06:06

## 2025-06-20 RX ADMIN — THIAMINE HYDROCHLORIDE 400 MG: 100 INJECTION, SOLUTION INTRAMUSCULAR; INTRAVENOUS at 02:06

## 2025-06-20 RX ADMIN — LORAZEPAM 2 MG: 2 INJECTION INTRAMUSCULAR; INTRAVENOUS at 10:06

## 2025-06-20 RX ADMIN — LORAZEPAM 2 MG: 2 INJECTION INTRAMUSCULAR; INTRAVENOUS at 08:06

## 2025-06-20 RX ADMIN — MUPIROCIN: 20 OINTMENT TOPICAL at 08:06

## 2025-06-20 RX ADMIN — THIAMINE HYDROCHLORIDE 400 MG: 100 INJECTION, SOLUTION INTRAMUSCULAR; INTRAVENOUS at 09:06

## 2025-06-20 RX ADMIN — QUETIAPINE FUMARATE 100 MG: 100 TABLET ORAL at 08:06

## 2025-06-20 RX ADMIN — PROCHLORPERAZINE EDISYLATE 5 MG: 5 INJECTION INTRAMUSCULAR; INTRAVENOUS at 02:06

## 2025-06-20 RX ADMIN — PROPRANOLOL HYDROCHLORIDE 20 MG: 20 TABLET ORAL at 08:06

## 2025-06-20 RX ADMIN — THIAMINE HYDROCHLORIDE 400 MG: 100 INJECTION, SOLUTION INTRAMUSCULAR; INTRAVENOUS at 05:06

## 2025-06-20 RX ADMIN — HALOPERIDOL LACTATE 5 MG: 5 INJECTION, SOLUTION INTRAMUSCULAR at 12:06

## 2025-06-20 RX ADMIN — Medication 400 MG: at 08:06

## 2025-06-20 RX ADMIN — LORAZEPAM 2 MG: 2 INJECTION INTRAMUSCULAR; INTRAVENOUS at 11:06

## 2025-06-20 RX ADMIN — ASCORBIC ACID, VITAMIN A PALMITATE, CHOLECALCIFEROL, THIAMINE HYDROCHLORIDE, RIBOFLAVIN-5 PHOSPHATE SODIUM, PYRIDOXINE HYDROCHLORIDE, NIACINAMIDE, DEXPANTHENOL, ALPHA-TOCOPHEROL ACETATE, VITAMIN K1, FOLIC ACID, BIOTIN, CYANOCOBALAMIN: 200; 3300; 200; 6; 3.6; 6; 40; 15; 10; 150; 600; 60; 5 INJECTION, SOLUTION INTRAVENOUS at 08:06

## 2025-06-20 NOTE — NURSING
"Pt more agitated states "I got to get out of here and get my money now". Pt was reoriented to time, place, and situation. Pt refuses to stop yelling out and contends he has to leave the hospital now. Pt continues to yell out, PRN administered for agitation.  "

## 2025-06-20 NOTE — NURSING
Pt asleep, arouses easily with verbal prompts. Pt awakened to ask if he would like to eat lunch. He requested to postpone lunch at this time and would like to alert nurse when he is ready to eat. Pt very calm and polite currently. Continuous monitoring maintained. VS stable.

## 2025-06-20 NOTE — CONSULTS
"6/20/2025  Yong Townsend   1965   [id number]            Psychiatry Initial Consult Note     Date of Admission: 6/16/2025  1:10 PM    Chief Complaint: Agitation, combative behaviors    -Date of Service 06/20/25  -Verbal consent obtained   -Real-time visit with audio and video  -Date last seen: N/A  -Patient location: [address] [place], [place] [address] [address]  ICU 3  -Provider Location: [address] [place] [id number]  -Start time: 1614  End Time 1649    SUBJECTIVE:   History of Present Illness:   Yong Townsend is a 60 y.o. male with a past medical history that includes HTN, alcohol use, gout, HLD, schizophrenia, vitamin-D deficiency, alcoholic cirrhosis, esophageal varices who presented to [facility] ED on 06/16/25 due to loss of consciousness. In ED documented to have passed out while having a bowel movement and was only responding to pain on arrival. Appears that he was preparing to go to [facility] and went to the bathroom at 0900. Was found unresponsive by his mother around 1030 and 911 called at 1330.  MRI of the brain is negative for any acute infarct, CT head is also negative for any acute findings as well as CT of the head and neck. Initial workup showed ammonia level of 166, hemoglobin 9.3, platelet count 75, sodium 141, potassium 3.9, and dimer of 0.85. Hie did get CTA head and neck so CTA chest was not obtained.     On 06/16/25 had documented episode in which he was attempting to get out of bed, pointed finger at nurse, and stated "yeah, I need to go". That night attempting to get out of bed and yelling "let me go". Was placed in bilateral wrist restraints. On 06/17/25 was placed on bedpan which he had removed and had bowel movement on pad. Yelling at staff to leave him alone and kicking at staff when they attempted to provide care after this BM. Precedex used at times during hospitalization due to combative behaviors. Today, documented to remain agitated, yelling, confused, and refusing to drink or take " "medications.     Being seen by psychiatry today given agitation and confusion. Seen over televisit where he is initially cooperative though he ultimately ends the interview short saying "doc, I'm going to have to go as I'm expecting a call" and displays increased irritability at questions after this. He is resting quietly in bed at time of interview. States he is currently in "[place]" but unable to state where in [place] and when asked if he is at home he states "yes". Later states he is in "[place]". Displays impaired attention and memory. States he takes four medications including "one for sleep". When asked if this might be trazodone he states "I can't remember". Not observed to be responding to internal stimuli. Denies feeling depressed or anxious.     Staff reports he lives with his parents and they report heavy whiskey use. Has a history of heavy alcohol use but BAL <10 on admission. History of cocaine use with UDS negative on admission.      Past Psychiatric History:   Previous Psychiatric Hospitalizations: Reports one hospitalization   Previous Medication Trials: Trazodone  Previous Suicide Attempts: Denies   Outpatient psychiatrist: [facility]     Current Medications:   Home Psychiatric Meds: Trazodone    Past Medical/Surgical History:   History reviewed. No pertinent past medical history.  History reviewed. No pertinent surgical history.      Family Psychiatric History:   Unable to assess     Allergies:   Review of patient's allergies indicates:  No Known Allergies    Substance Abuse History:   Tobacco: Denies  Alcohol: Denies. History of heavy alcohol use with suspected recent use.  Illicit Substances: Denies. History of cocaine use with UDS two months ago positive for cocaine  Treatment: Unable to assess        Scheduled Meds:    cholecalciferol (vitamin D3)  50,000 Units Oral Weekly    famotidine (PF)  20 mg Intravenous Q12H    lactulose  20 g Oral " Daily    lorazepam  2 mg Intravenous Q2H    losartan  50 mg Oral Daily    magnesium oxide  400 mg Oral BID    mupirocin   Nasal BID    potassium chloride  40 mEq Oral Daily    propranoloL  20 mg Oral BID    thiamine (B-1) injection  400 mg Intravenous Q8H    Followed by    [START ON 6/21/2025] thiamine  100 mg Oral Q8H      PRN Meds:   Current Facility-Administered Medications:     cloNIDine, 0.2 mg, Oral, Q6H PRN    dextrose 50%, 12.5 g, Intravenous, PRN    dextrose 50%, 25 g, Intravenous, PRN    glucagon (human recombinant), 1 mg, Intramuscular, PRN    glucose, 16 g, Oral, PRN    glucose, 24 g, Oral, PRN    haloperidol lactate, 5 mg, Intramuscular, Q6H PRN    hydrALAZINE, 10 mg, Intravenous, Q6H PRN    labetaloL, 20 mg, Intravenous, Q4H PRN    naloxone, 0.02 mg, Intravenous, PRN    ondansetron, 4 mg, Intravenous, Q8H PRN    prochlorperazine, 5 mg, Intravenous, Q6H PRN    sodium chloride 0.9%, 10 mL, Intravenous, PRN    sodium chloride 0.9%, 10 mL, Intravenous, Q12H PRN    ziprasidone, 20 mg, Intramuscular, Q12H PRN   Psychotherapeutics (From admission, onward)      Start     Stop Route Frequency Ordered    06/20/25 1600  LORazepam injection 2 mg         -- IV Every 2 hours 06/20/25 1329    06/19/25 2047  ziprasidone injection 20 mg         -- IM Every 12 hours PRN 06/19/25 1947 06/19/25 2047  haloperidol lactate injection 5 mg         -- IM Every 6 hours PRN 06/19/25 1947              Social History:  Lives with parents. Unable to assess at this time due to reluctance from patient.      OBJECTIVE:       Vitals   Vitals:    06/20/25 1501   BP:    Pulse:    Resp:    Temp: 98.5 °F (36.9 °C)        Labs/Imaging/Studies:   Recent Results (from the past 48 hours)   Occult Blood, Stool 3rd Specimen    Collection Time: 06/18/25  6:53 PM   Result Value Ref Range    Stool Color 3 Green     Stool Consistancy 3 liquid     Occult Blood Stool 3 Positive (A) Negative, N/A   Comprehensive Metabolic Panel (CMP)    Collection  Time: 06/19/25  4:40 AM   Result Value Ref Range    Sodium 139 136 - 145 mmol/L    Potassium 3.7 3.5 - 5.1 mmol/L    Chloride 119 (H) 98 - 110 mmol/L    CO2 19 (L) 21 - 32 mmol/L    Glucose 96 70 - 115 mg/dL    Blood Urea Nitrogen 9 7.0 - 20.0 mg/dL    Creatinine 1.05 0.66 - 1.25 mg/dL    Calcium 8.7 8.4 - 10.2 mg/dL    Protein Total 5.7 (L) 6.3 - 8.2 gm/dL    Albumin 2.2 (L) 3.4 - 5.0 g/dL    Globulin 3.5 2.0 - 3.9 gm/dL    Albumin/Globulin Ratio 0.6 ratio    Bilirubin Total 3.5 (H) 0.0 - 1.0 mg/dL    ALP 78 50 - 144 unit/L    ALT 20 1 - 45 unit/L    AST 47 17 - 59 unit/L    eGFR 81 mL/min/1.73/m2    Anion Gap 1.0 (L) 2.0 - 13.0 mEq/L    BUN/Creatinine Ratio 9 (L) 12 - 20   Magnesium    Collection Time: 06/19/25  4:40 AM   Result Value Ref Range    Magnesium Level 1.60 (L) 1.80 - 2.40 mg/dL   Phosphorus    Collection Time: 06/19/25  4:40 AM   Result Value Ref Range    Phosphorus Level 4.2 2.5 - 4.9 mg/dL   Ammonia    Collection Time: 06/19/25  4:40 AM   Result Value Ref Range    Ammonia Level 32.0 11.0 - 32.0 umol/L   Folate    Collection Time: 06/19/25  4:40 AM   Result Value Ref Range    Folate Level >20.0 (H) 2.8 - 20.0 ng/mL   CBC with Differential    Collection Time: 06/19/25  4:40 AM   Result Value Ref Range    WBC 2.29 (L) 4.00 - 11.50 x10(3)/mcL    RBC 2.49 (L) 4.00 - 6.00 x10(6)/mcL    Hgb 7.5 (L) 13.0 - 18.0 g/dL    Hct 23.0 (L) 36.0 - 52.0 %    MCV 92.4 79.0 - 99.0 fL    MCH 30.1 27.0 - 34.0 pg    MCHC 32.6 31.0 - 37.0 g/dL    RDW 17.4 %    Platelet 36 (LL) 140 - 371 x10(3)/mcL    MPV      Neut % 44.2 37 - 73 %    Lymph % 33.6 20 - 55 %    Mono % 17.0 (H) 4.7 - 12.5 %    Eos % 4.4 0.7 - 7 %    Basophil % 0.4 0.1 - 1.2 %    Lymph # 0.77 (L) 1.32 - 3.57 x10(3)/mcL    Neut # 1.01 (L) 1.78 - 5.38 x10(3)/mcL    Mono # 0.39 0.3 - 0.82 x10(3)/mcL    Eos # 0.10 0.04 - 0.54 x10(3)/mcL    Baso # 0.01 0.01 - 0.08 x10(3)/mcL    Imm Gran # 0.01 0.00 - 0.03 x10(3)/mcL    Imm Grans % 0.4 0 - 0.5 %   Blood Smear  "Microscopic Exam    Collection Time: 06/19/25  4:40 AM   Result Value Ref Range    RBC Morph Abnormal (A) Normal    Anisocytosis 1+ (A) (none)    Elliptocytosis 1+ (A) (none)    Poikilocytosis 1+ (A) (none)    Platelets Decreased (A) Normal, Adequate   HIV 1/2 Ag/Ab (4th Gen)    Collection Time: 06/19/25  4:40 AM   Result Value Ref Range    HIV Nonreactive Nonreactive   Hepatitis Panel, Acute    Collection Time: 06/19/25  4:40 AM   Result Value Ref Range    Hep A IgM Interp Nonreactive Nonreactive    Hep B Core IgM Interp Nonreactive Nonreactive    Hep BsAg Interp Nonreactive Nonreactive    Hep C Ab Interp Nonreactive Nonreactive      No results found for: "PHENYTOIN", "PHENOBARB", "VALPROATE", "CBMZ"        Psychiatric Mental Status Exam:  General Appearance: appears stated age, dressed in hospital garb, lying in bed, in no acute distress  Arousal: drowsy  Behavior: polite, under good behavioral control, poor eye contact  Movements and Motor Activity: no tics, no tremors, no akathisia, no dystonia, no evidence of tardive dyskinesia, +psychomotor retardation  Orientation: oriented to person only  Speech: increased latency of response, soft  Mood: Near euthymic  Affect: blunted  Thought Process: goal-directed  Associations: no loosening of associations  Thought Content and Perceptions: no suicidal or homicidal ideation, no auditory or visual hallucinations, no paranoid ideation, no ideas of reference, no evidence of delusions or psychosis  Recent and Remote Memory: impaired; per interview/observation with patient  Attention and Concentration: impaired; per interview/observation with patient  Fund of Knowledge: vocabulary appropriate; based on history, vocabulary, fund of knowledge, syntax, grammar, and content  Insight: impaired; based on understanding of severity of illness and HPI  Judgment: impaired; based on patient's behavior and HPI          ASSESSMENT/PLAN:   Diagnoses:  Unspecified delirium like to due to " alcohol withdrawal, hepatic encephalopathy, polypharmacy?  Alcohol use disorder      Problem lists and Management Plans:  Medication Management  Quetiapine 100mg PO BID  Continue ziprasidone 20mg IM Q12hr PRN agitation  Continue haloperidol 5mg IM Q6hr PRN agitation  Ativan 2mg IV Q2hr per primary team  Monitoring  EKG on 06/16/25 with Qtc of 473ms  Legal  Currently with aggressive behaviors secondary to delirium.  Psychiatry will sign-off        Alfred Mario

## 2025-06-20 NOTE — NURSING
Telepsych consult called in and received by [facility] central intake representative.

## 2025-06-20 NOTE — NURSING
Attempted to bath pt and change linens.  Pt got very agitated and yelling at staff.  Pt confused and refusing to drink or take any medication at this time stating that he just wants to sleep.

## 2025-06-20 NOTE — PLAN OF CARE
Problem: Adult Inpatient Plan of Care  Goal: Plan of Care Review  Outcome: Progressing  Goal: Patient-Specific Goal (Individualized)  Outcome: Progressing  Goal: Absence of Hospital-Acquired Illness or Injury  Outcome: Progressing  Goal: Optimal Comfort and Wellbeing  Outcome: Progressing  Goal: Readiness for Transition of Care  Outcome: Progressing     Problem: Delirium  Goal: Optimal Coping  Outcome: Progressing  Goal: Improved Behavioral Control  Outcome: Progressing  Goal: Improved Attention and Thought Clarity  Outcome: Progressing  Goal: Improved Sleep  Outcome: Progressing     Problem: Fall Injury Risk  Goal: Absence of Fall and Fall-Related Injury  Outcome: Progressing     Problem: Restraint, Nonviolent  Goal: Absence of Harm or Injury  Outcome: Progressing     Problem: Infection  Goal: Absence of Infection Signs and Symptoms  Outcome: Progressing

## 2025-06-20 NOTE — NURSING
Message sent to hospitalist about patient's agitation and confusion. Pt continues to holler out and kicking legs over the side rails.  He is currently in bilateral soft wrist restraints and roll belt. He spit out his po ativan dose for 1800 and was given IV ativan as ordered prn but continues to be very agitated. Awaiting call back from MD.

## 2025-06-20 NOTE — PLAN OF CARE
Problem: Adult Inpatient Plan of Care  Goal: Plan of Care Review  Outcome: Progressing  Goal: Patient-Specific Goal (Individualized)  Outcome: Progressing  Goal: Absence of Hospital-Acquired Illness or Injury  Outcome: Progressing  Goal: Optimal Comfort and Wellbeing  Outcome: Progressing  Goal: Readiness for Transition of Care  Outcome: Not Progressing     Problem: Delirium  Goal: Optimal Coping  Outcome: Not Progressing  Goal: Improved Behavioral Control  Outcome: Not Progressing  Goal: Improved Attention and Thought Clarity  Outcome: Not Progressing  Goal: Improved Sleep  Outcome: Not Progressing     Problem: Fall Injury Risk  Goal: Absence of Fall and Fall-Related Injury  Outcome: Progressing     Problem: Restraint, Nonviolent  Goal: Absence of Harm or Injury  Outcome: Progressing     Problem: Infection  Goal: Absence of Infection Signs and Symptoms  Outcome: Progressing

## 2025-06-20 NOTE — PROGRESS NOTES
HOSPITAL MEDICINE - ADMISSION NOTE    Chief Complaint  Unresponsiveness     History of Present Illness     This is a 60 years old male patient with unknown PMH but taking Trazodone, Baclofen, and Tizanidine was brought to the emergency room to be evaluated for unresponsiveness. Patient is currently pleasantly confused, spontaneously moving extremities in the bed but not following command verbally. Entire history is obtained from emergency and nursing report. I did try to reach to patients mother but unable to reach. As per the report patient was at [facility] this morning and went for the washroom around 9. His mother found him unresponsive in the washroom around 10:30 but for some reason she did not call 911 until 1:30 PM. She thought that patient will be responsive, but he did not so 911 was eventually called. On arrival CT scan of the head, CTA of the head and neck and MRI of the brain were obtained. Neurologist was also consulted. MRI of the brain is negative for any acute infarct, CT head is also negative for any acute findings as well as CT of the head and neck. Initial workup showed ammonia level of 166, hemoglobin 9.3, platelet count 75, sodium 141, potassium 3.9, and dimer of 0.85. Hie did get CTA head and neck so CTA chest was not obtained. On arrival patient's GCS level was only 8 and was only responsive to painful stimuli. During the course of the emergency room he has become more awake and GCS score improved. Before MRI patient was agitated so he required IV Versed and Lorazepam. Medicine team is asked to hospitalize the patient for further care. No fever or diarrhea or reported chest pain or SOB or tingling or numbness or slurred speech or focal weakness prior to unresponsiveness.      06/17/2025 pt admitted with hepatic encephalopathy, h/o significant ETOH use, drinks beer and whisky according to his mom.  Work up in ER essentially negative.    Pt apparently has 2 epic charts with different MRN,  [id number] and this chart [id number] in the original chart he has multiple admissions to [facility] in [place] for alcoholic cirrhosis, h/o esophageal banding and varicees back to 2018, ETOH use, Cocaine +, Marijuana + , Has reportedly seen Dr. Mon in the past, saw DR. Rodriguez in 2022 for varicees after being transferred to [facility] from [facility].  Pt with h/o Thrombocytopenia last 5/2025 was 23, received platelts in [place] at that time.  Mild Curly and anemia requiring transfusions in the past.  His ammonia was 150+ on admit here.  06/18/2025 pt more awake this am, improving mental staus, more sleepy this am  06/19/2025 pt with increased aggitation started mid morning yesterday back on iv ativan needed some precedex overnight due to combative behaviors, currently resting off precedex received iv ativan, on high dose thiamine and MVI iv bannana bags  06/20/2025 pt still confused ativan giving 2mg iv q 2 hrs, pt not taking po ativan    Review of Systems  Review of Systems   Unable to perform ROS: Mental status change         PSH and PMH: As in HPI    Social History     Social History Narrative    Not on file      Review of patient's allergies indicates:  No Known Allergies    Objective   BP (!) 151/84   Pulse 91   Temp 98.8 °F (37.1 °C) (Axillary)   Resp 16   Wt 79.4 kg (175 lb 0.7 oz)   SpO2 100%       Physical Exam  Vitals and nursing note reviewed. Exam conducted with a chaperone present.   Constitutional:       General: He is not in acute distress.     Appearance: Normal appearance. He is normal weight. He is not ill-appearing.   Cardiovascular:      Rate and Rhythm: Normal rate and regular rhythm.      Pulses: Normal pulses.      Heart sounds: Normal heart sounds.   Pulmonary:      Effort: Pulmonary effort is normal.      Breath sounds: Normal breath sounds.   Abdominal:      General: Abdomen is flat.      Palpations: Abdomen is soft.   Musculoskeletal:      Right lower leg: No edema.      Left lower leg:  "No edema.   Skin:     General: Skin is warm and dry.      Findings: No erythema or rash.      Comments: Multiple areas of old scarred on LE, unsure etiology, no open areas or redness or bleeding   Neurological:      General: No focal deficit present.      Mental Status: He is alert. Mental status is at baseline.   Psychiatric:         Mood and Affect: Mood normal.         Behavior: Behavior normal.           Assessment and Plan     Syncope     Hepatic encephalopathy  recheck ammonia level in am  Pt with aggressive and self injurious behaviors, still requiring ativan and restraints     Likely polypharmacy due to Baclofen,Tizanidine and Trazodone as well these meds are being held    Thrombocytopenia due to cirrhosis  The likely etiology of thrombocytopenia is liver disease. The patients 3 most recent labs are listed below.  Recent Labs     06/18/25  0348 06/19/25  0440   PLT 46* 36*     Plan  - Will transfuse if platelet count is <50k (if undergoing surgical procedure or have active bleeding).  -       H/o GI bleed and esophageal varicees s/p banding 2018    Alcoholic Cirrhosis continue ativan iv, precedex if needed  No seizures or h/o seizures per family  Continue thimine and MVI    Hypomagnesemia  Patient has Abnormal Magnesium: hypomagnesemia. Will continue to monitor electrolytes closely. Will replace the affected electrolytes and repeat labs to be done after interventions completed. The patient's magnesium results have been reviewed and are listed below.  No results for input(s): "MG" in the last 24 hours.  Give another 2gm mag this am    Anemia  Anemia is likely due to chronic blood loss and Iron deficiency. Most recent hemoglobin and hematocrit are listed below.  Recent Labs     06/18/25 0348 06/19/25  0440   HGB 8.3* 7.5*   HCT 25.2* 23.0*     Plan  - Monitor serial CBC: Daily  - Transfuse PRBC if patient becomes hemodynamically unstable, symptomatic or H/H drops below 7/21.  - Patient has not received any " PRBC transfusions to date  - Patient's anemia is currently stable  -  check irone and b12, folate levels  Known h/o esophageal varicees  Occult +      HTN  Patients blood pressure range in the last 24 hours was: BP  Min: 68/46  Max: 200/115.The patient's inpatient anti-hypertensive regimen is listed below:  Current Antihypertensives  , 2 times daily, Oral  propranoloL tablet 20 mg, 2 times daily, Oral  labetalol 20 mg/4 mL (5 mg/mL) IV syring, Every 4 hours PRN, Intravenous  hydrALAZINE injection 10 mg, Every 6 hours PRN, Intravenous  cloNIDine tablet 0.2 mg, Every 6 hours PRN, Oral  losartan tablet 50 mg, Daily, Oral    Plan  - BP is uncontrolled, will adjust as follows: add losartan 50mg daily  -         Fall precautions   Seizure precautions     Decrease ativan to 0.5mg q 6 hrs  Continue Lactulose po TID  Rechek ammonia level in am   PTOT consult   Will keep NPO until patient becomes fully alert.   Gentle IV fluid   UDS neg     CODE STATUS: Full Code. Information obtained from ER report.     Anticipate > 2 midnights stay, patient will be admitted as an inpatient for above mentioned reasons.      Reviewed Home medications, current medications and inpatient medications with patient.    Patient Screened for food insecurity, housing instability, transportation needs, utility difficulties, living conditions and interpersonal safety.   No needs identified.          Taisha Rucker MD  [facility]

## 2025-06-20 NOTE — PLAN OF CARE
06/20/25 1059   Discharge Reassessment   Assessment Type Discharge Planning Reassessment   Did the patient's condition or plan change since previous assessment? No   Discharge Plan discussed with: Patient   Communicated ARIANE with patient/caregiver Date not available/Unable to determine   Discharge Plan A Psychiatric hospital   Discharge Plan B Rehab   DME Needed Upon Discharge  none   Transition of Care Barriers Does not adhere to care plan;Mobility;Substance Abuse;Social   Why the patient remains in the hospital Requires continued medical care   Post-Acute Status   Discharge Delays None known at this time

## 2025-06-21 LAB
ALBUMIN SERPL-MCNC: 2.5 G/DL (ref 3.4–5)
ALBUMIN/GLOB SERPL: 0.6 RATIO
ALP SERPL-CCNC: 88 UNIT/L (ref 50–144)
ALT SERPL-CCNC: 25 UNIT/L (ref 1–45)
ANION GAP SERPL CALC-SCNC: 3 MEQ/L (ref 2–13)
AST SERPL-CCNC: 61 UNIT/L (ref 17–59)
BASOPHILS # BLD AUTO: 0.01 X10(3)/MCL (ref 0.01–0.08)
BASOPHILS NFR BLD AUTO: 0.3 % (ref 0.1–1.2)
BILIRUB SERPL-MCNC: 4 MG/DL (ref 0–1)
BUN SERPL-MCNC: 10 MG/DL (ref 7–20)
CALCIUM SERPL-MCNC: 8.6 MG/DL (ref 8.4–10.2)
CHLORIDE SERPL-SCNC: 116 MMOL/L (ref 98–110)
CO2 SERPL-SCNC: 17 MMOL/L (ref 21–32)
CREAT SERPL-MCNC: 0.97 MG/DL (ref 0.66–1.25)
CREAT/UREA NIT SERPL: 10 (ref 12–20)
EOSINOPHIL # BLD AUTO: 0.13 X10(3)/MCL (ref 0.04–0.54)
EOSINOPHIL NFR BLD AUTO: 3.7 % (ref 0.7–7)
ERYTHROCYTE [DISTWIDTH] IN BLOOD BY AUTOMATED COUNT: 17.5 %
GFR SERPLBLD CREATININE-BSD FMLA CKD-EPI: 89 ML/MIN/1.73/M2
GLOBULIN SER-MCNC: 3.9 GM/DL (ref 2–3.9)
GLUCOSE SERPL-MCNC: 85 MG/DL (ref 70–115)
HCT VFR BLD AUTO: 24.7 % (ref 36–52)
HGB BLD-MCNC: 8.1 G/DL (ref 13–18)
IMM GRANULOCYTES # BLD AUTO: 0.01 X10(3)/MCL (ref 0–0.03)
IMM GRANULOCYTES NFR BLD AUTO: 0.3 % (ref 0–0.5)
LYMPHOCYTES # BLD AUTO: 0.8 X10(3)/MCL (ref 1.32–3.57)
LYMPHOCYTES NFR BLD AUTO: 22.5 % (ref 20–55)
MAGNESIUM SERPL-MCNC: 1.6 MG/DL (ref 1.8–2.4)
MCH RBC QN AUTO: 30.8 PG (ref 27–34)
MCHC RBC AUTO-ENTMCNC: 32.8 G/DL (ref 31–37)
MCV RBC AUTO: 93.9 FL (ref 79–99)
MONOCYTES # BLD AUTO: 0.61 X10(3)/MCL (ref 0.3–0.82)
MONOCYTES NFR BLD AUTO: 17.2 % (ref 4.7–12.5)
NEUTROPHILS # BLD AUTO: 1.99 X10(3)/MCL (ref 1.78–5.38)
NEUTROPHILS NFR BLD AUTO: 56 % (ref 37–73)
PLATELET # BLD AUTO: 42 X10(3)/MCL (ref 140–371)
PMV BLD AUTO: ABNORMAL FL
POTASSIUM SERPL-SCNC: 3.8 MMOL/L (ref 3.5–5.1)
PROT SERPL-MCNC: 6.4 GM/DL (ref 6.3–8.2)
RBC # BLD AUTO: 2.63 X10(6)/MCL (ref 4–6)
SODIUM SERPL-SCNC: 136 MMOL/L (ref 136–145)
WBC # BLD AUTO: 3.55 X10(3)/MCL (ref 4–11.5)

## 2025-06-21 PROCEDURE — 25000003 PHARM REV CODE 250

## 2025-06-21 PROCEDURE — 85025 COMPLETE CBC W/AUTO DIFF WBC: CPT | Performed by: FAMILY MEDICINE

## 2025-06-21 PROCEDURE — 20000000 HC ICU ROOM

## 2025-06-21 PROCEDURE — 63600175 PHARM REV CODE 636 W HCPCS

## 2025-06-21 PROCEDURE — 25000003 PHARM REV CODE 250: Performed by: FAMILY MEDICINE

## 2025-06-21 PROCEDURE — 83735 ASSAY OF MAGNESIUM: CPT | Performed by: FAMILY MEDICINE

## 2025-06-21 PROCEDURE — 63600175 PHARM REV CODE 636 W HCPCS: Performed by: FAMILY MEDICINE

## 2025-06-21 PROCEDURE — 80053 COMPREHEN METABOLIC PANEL: CPT | Performed by: FAMILY MEDICINE

## 2025-06-21 RX ADMIN — MUPIROCIN: 20 OINTMENT TOPICAL at 08:06

## 2025-06-21 RX ADMIN — THIAMINE HCL TAB 100 MG 100 MG: 100 TAB at 08:06

## 2025-06-21 RX ADMIN — FAMOTIDINE 20 MG: 10 INJECTION, SOLUTION INTRAVENOUS at 08:06

## 2025-06-21 RX ADMIN — QUETIAPINE FUMARATE 100 MG: 100 TABLET ORAL at 08:06

## 2025-06-21 RX ADMIN — LORAZEPAM 2 MG: 2 INJECTION INTRAMUSCULAR; INTRAVENOUS at 06:06

## 2025-06-21 RX ADMIN — POTASSIUM CHLORIDE 40 MEQ: 1500 TABLET, EXTENDED RELEASE ORAL at 08:06

## 2025-06-21 RX ADMIN — THIAMINE HCL TAB 100 MG 100 MG: 100 TAB at 01:06

## 2025-06-21 RX ADMIN — LOSARTAN POTASSIUM 50 MG: 50 TABLET, FILM COATED ORAL at 08:06

## 2025-06-21 RX ADMIN — PROPRANOLOL HYDROCHLORIDE 20 MG: 20 TABLET ORAL at 08:06

## 2025-06-21 RX ADMIN — Medication 400 MG: at 08:06

## 2025-06-21 RX ADMIN — THIAMINE HYDROCHLORIDE 400 MG: 100 INJECTION, SOLUTION INTRAMUSCULAR; INTRAVENOUS at 06:06

## 2025-06-21 RX ADMIN — LORAZEPAM 2 MG: 2 INJECTION INTRAMUSCULAR; INTRAVENOUS at 08:06

## 2025-06-21 RX ADMIN — LORAZEPAM 2 MG: 2 INJECTION INTRAMUSCULAR; INTRAVENOUS at 04:06

## 2025-06-21 RX ADMIN — LORAZEPAM 2 MG: 2 INJECTION INTRAMUSCULAR; INTRAVENOUS at 02:06

## 2025-06-21 RX ADMIN — LORAZEPAM 2 MG: 2 INJECTION INTRAMUSCULAR; INTRAVENOUS at 12:06

## 2025-06-21 NOTE — PROGRESS NOTES
Hospital Medicine  Progress Note    Patient Name: Yong Townsend  MRN: [id number]  Status: IP- Inpatient   Admission Date: 6/16/2025  Length of Stay: 5  Date of Service: 06/21/2025       CC: hospital follow-up for AMS       SUBJECTIVE    60 years old male patient with unknown PMH but taking Trazodone, Baclofen, and Tizanidine was brought to the emergency room to be evaluated for unresponsiveness. Patient is currently pleasantly confused, spontaneously moving extremities in the bed but not following command verbally. Entire history is obtained from emergency and nursing report. I did try to reach to patients mother but unable to reach. As per the report patient was at [facility] this morning and went for the washroom around 9. His mother found him unresponsive in the washroom around 10:30 but for some reason she did not call 911 until 1:30 PM. She thought that patient will be responsive, but he did not so 911 was eventually called. On arrival CT scan of the head, CTA of the head and neck and MRI of the brain were obtained. Neurologist was also consulted. MRI of the brain is negative for any acute infarct, CT head is also negative for any acute findings as well as CT of the head and neck. Initial workup showed ammonia level of 166, hemoglobin 9.3, platelet count 75, sodium 141, potassium 3.9, and dimer of 0.85. Hie did get CTA head and neck so CTA chest was not obtained. On arrival patient's GCS level was only 8 and was only responsive to painful stimuli. During the course of the emergency room he has become more awake and GCS score improved. Before MRI patient was agitated so he required IV Versed and Lorazepam. Medicine team is asked to hospitalize the patient for further care. No fever or diarrhea or reported chest pain or SOB or tingling or numbness or slurred speech or focal weakness prior to unresponsiveness.      06/17/2025 pt admitted with hepatic encephalopathy, h/o significant ETOH use, drinks beer and whisky  according to his mom.  Work up in ER essentially negative.    Pt apparently has 2 epic charts with different MRN, [id number] and this chart [id number] in the original chart he has multiple admissions to [facility] in [place] for alcoholic cirrhosis, h/o esophageal banding and varicees back to 2018, ETOH use, Cocaine +, Marijuana + , Has reportedly seen Dr. Mon in the past, saw DR. Rodriguez in 2022 for varicees after being transferred to [facility] from [facility].  Pt with h/o Thrombocytopenia last 5/2025 was 23, received platelts in [place] at that time.  Mild Curly and anemia requiring transfusions in the past.  His ammonia was 150+ on admit here.  06/18/2025 pt more awake this am, improving mental staus, more sleepy this am  06/19/2025 pt with increased aggitation started mid morning yesterday back on iv ativan needed some precedex overnight due to combative behaviors, currently resting off precedex received iv ativan, on high dose thiamine and MVI iv bannana bags  06/20/2025 pt still confused ativan giving 2mg iv q 2 hrs, pt not taking po ativan  3/21/2025 started taking po meds today with breakfast  Will try adding po taper today     Today: Patient seen and examined at bedside, and chart reviewed.       MEDICATIONS   Scheduled   cholecalciferol (vitamin D3)  50,000 Units Oral Weekly    famotidine (PF)  20 mg Intravenous Q12H    lactulose  20 g Oral Daily    lorazepam  2 mg Intravenous Q2H    losartan  50 mg Oral Daily    magnesium oxide  400 mg Oral BID    mupirocin   Nasal BID    potassium chloride  40 mEq Oral Daily    propranoloL  20 mg Oral BID    QUEtiapine  100 mg Oral BID    thiamine  100 mg Oral Q8H     Continuous Infusions        PHYSICAL EXAM   VITALS: T 97.8 °F (36.6 °C)   BP (!) 175/104   P 86   RR (!) 22   O2 100 %      Constitutional:       General: He is not in acute distress.     Appearance: Normal appearance. He is normal weight. He is not ill-appearing.   Cardiovascular:      Rate and Rhythm: Normal  rate and regular rhythm.      Pulses: Normal pulses.      Heart sounds: Normal heart sounds.   Pulmonary:      Effort: Pulmonary effort is normal.      Breath sounds: Normal breath sounds.   Abdominal:      General: Abdomen is flat.      Palpations: Abdomen is soft.   Musculoskeletal:      Right lower leg: No edema.      Left lower leg: No edema.   Skin:     General: Skin is warm and dry.      Findings: No erythema or rash.      Comments: Multiple areas of old scarred on LE, unsure etiology, no open areas or redness or bleeding   Neurological:      General: No focal deficit present.      Mental Status: He is alert. Mental status is at baseline.   Psychiatric:         Mood and Affect: Mood normal.         Behavior: Behavior normal.        LABS   CBC  Recent Labs     06/19/25 0440 06/21/25  0459   WBC 2.29* 3.55*   RBC 2.49* 2.63*   HGB 7.5* 8.1*   HCT 23.0* 24.7*   MCV 92.4 93.9   MCH 30.1 30.8   MCHC 32.6 32.8   RDW 17.4 17.5   PLT 36* 42*     CHEM  Recent Labs     06/19/25 0440 06/21/25  0459    136   K 3.7 3.8   CO2 19* 17*   BUN 9 10   CREATININE 1.05 0.97   CALCIUM 8.7 8.6   MG 1.60* 1.60*   PHOS 4.2  --    ALBUMIN 2.2* 2.5*   GLOBULIN 3.5 3.9   ALKPHOS 78 88   ALT 20 25   AST 47 61*   BILITOT 3.5* 4.0*         MICROBIOLOGY     Microbiology Results (last 7 days)       ** No results found for the last 168 hours. **              DIAGNOSTICS   US Abdomen Limited  Narrative: EXAMINATION:  STUDY: US ABDOMEN LIMITED    CLINICAL HISTORY AND TECHNIQUE:  Hepatic encephalopathy    COMPARISON:  None    FINDINGS:  The quality of this examination is very limited due to lack of patient cooperation and difficulty in proper positioning and control of respiration.    Liver:   The liver appears mildly atrophic with a slightly heterogeneous echogenic appearance but no worrisome focal parenchymal abnormalities are dilated biliary radicles.  There is adequate antegrade flow within the portal vein with no evidence of portal  "venous shunting.    Gallbladder/biliary system: The gallbladder is adequately distended with no evidence of cholelithiasis, thickening of the gallbladder wall or pericholecystic edema. The patient was not significantly tender while scanning over the gallbladder. The common bile duct, where visualized, is not dilated nor do I see evidence of intraductal stones.    Miscellaneous: N/a  Impression: 1. Less than optimal examination due to lack of patient cooperation, difficulty and proper positioning and control of respiration.  2. The liver appears mildly atrophic with a slightly heterogeneous echogenic appearance.  These findings are nonspecific but can be seen with cirrhosis and clinical correlation is recommended.    Electronically signed by: Nasir Aden  Date:    06/17/2025  Time:    07:32    Assessment and Plan      Syncope      Hepatic encephalopathy   Pt with aggressive and self injurious behaviors, still requiring ativan and restraints  Try adding po taper    Likely polypharmacy due to Baclofen,Tizanidine and Trazodone as well these meds are being held     Thrombocytopenia due to cirrhosis  The likely etiology of thrombocytopenia is liver disease. The patients 3 most recent labs are listed below.       Recent Labs     06/18/25  0348 06/19/25  0440   PLT 46* 36*      Plan  - Will transfuse if platelet count is <50k (if undergoing surgical procedure or have active bleeding).  -        H/o GI bleed and esophageal varicees s/p banding 2018     Alcoholic Cirrhosis continue ativan iv, precedex if needed  No seizures or h/o seizures per family  Continue thimine and MVI  Po ativan taper     Hypomagnesemia  Patient has Abnormal Magnesium: hypomagnesemia. Will continue to monitor electrolytes closely. Will replace the affected electrolytes and repeat labs to be done after interventions completed. The patient's magnesium results have been reviewed and are listed below.  No results for input(s): "MG" in the last 24 " hours.  Give another 2gm mag this am     Anemia  Anemia is likely due to chronic blood loss and Iron deficiency. Most recent hemoglobin and hematocrit are listed below.       Recent Labs     06/18/25  0348 06/19/25  0440   HGB 8.3* 7.5*   HCT 25.2* 23.0*      Plan  - Monitor serial CBC: Daily  - Transfuse PRBC if patient becomes hemodynamically unstable, symptomatic or H/H drops below 7/21.  - Patient has not received any PRBC transfusions to date  - Patient's anemia is currently stable  -  check irone and b12, folate levels  Known h/o esophageal varicees  Occult +        HTN  Patients blood pressure range in the last 24 hours was: BP  Min: 68/46  Max: 200/115.The patient's inpatient anti-hypertensive regimen is listed below:  Current Antihypertensives  , 2 times daily, Oral  propranoloL tablet 20 mg, 2 times daily, Oral  labetalol 20 mg/4 mL (5 mg/mL) IV syring, Every 4 hours PRN, Intravenous  hydrALAZINE injection 10 mg, Every 6 hours PRN, Intravenous  cloNIDine tablet 0.2 mg, Every 6 hours PRN, Oral  losartan tablet 50 mg, Daily, Oral     Plan  - BP is uncontrolled, will adjust as follows: add losartan 50mg daily  -          Fall precautions   Seizure precautions     Decrease ativan to 0.5mg q 6 hrs  Continue Lactulose po TID  Rechek ammonia level in am   PTOT consult   Will keep NPO until patient becomes fully alert.   Gentle IV fluid   UDS neg             Pawel Dove MD  [facility]

## 2025-06-21 NOTE — PLAN OF CARE
Problem: Adult Inpatient Plan of Care  Goal: Absence of Hospital-Acquired Illness or Injury  Outcome: Progressing     Problem: Fall Injury Risk  Goal: Absence of Fall and Fall-Related Injury  Outcome: Progressing     Problem: Restraint, Nonviolent  Goal: Absence of Harm or Injury  Outcome: Progressing     Problem: Infection  Goal: Absence of Infection Signs and Symptoms  Outcome: Progressing     Problem: Adult Inpatient Plan of Care  Goal: Plan of Care Review  Outcome: Not Progressing  Goal: Optimal Comfort and Wellbeing  Outcome: Not Progressing  Goal: Readiness for Transition of Care  Outcome: Not Progressing     Problem: Delirium  Goal: Optimal Coping  Outcome: Not Progressing  Goal: Improved Behavioral Control  Outcome: Not Progressing  Goal: Improved Attention and Thought Clarity  Outcome: Not Progressing  Goal: Improved Sleep  Outcome: Not Progressing

## 2025-06-21 NOTE — NURSING
Lab at bedside for morning draw,pt hollering at lab to stop, pt has hollered at staff all night when staff needed to do anything w/patient:taking out infiltrated iv,restarting iv,changing pt,checking pt's skin,assessments

## 2025-06-21 NOTE — PLAN OF CARE
Problem: Adult Inpatient Plan of Care  Goal: Plan of Care Review  Outcome: Progressing  Goal: Patient-Specific Goal (Individualized)  Outcome: Progressing  Goal: Absence of Hospital-Acquired Illness or Injury  Outcome: Progressing  Goal: Optimal Comfort and Wellbeing  Outcome: Progressing  Goal: Readiness for Transition of Care  Outcome: Progressing     Problem: Delirium  Goal: Optimal Coping  Outcome: Not Progressing  Goal: Improved Behavioral Control  Outcome: Progressing  Goal: Improved Attention and Thought Clarity  Outcome: Progressing  Goal: Improved Sleep  Outcome: Progressing     Problem: Fall Injury Risk  Goal: Absence of Fall and Fall-Related Injury  Outcome: Progressing     Problem: Restraint, Nonviolent  Goal: Absence of Harm or Injury  Outcome: Progressing     Problem: Infection  Goal: Absence of Infection Signs and Symptoms  Outcome: Progressing

## 2025-06-21 NOTE — NURSING
"Pt very drowsy, arouses to verbal stimuli, calm and pleasant while awake. Pt woke up and stated "I have to put my shoes on". Still remains confused. Family member at bedside.   "

## 2025-06-22 LAB
ALBUMIN SERPL-MCNC: 2.7 G/DL (ref 3.4–5)
ALBUMIN/GLOB SERPL: 0.7 RATIO
ALP SERPL-CCNC: 82 UNIT/L (ref 50–144)
ALT SERPL-CCNC: 27 UNIT/L (ref 1–45)
AMMONIA PLAS-MSCNC: 44 UMOL/L (ref 11–32)
ANION GAP SERPL CALC-SCNC: 4 MEQ/L (ref 2–13)
AST SERPL-CCNC: 59 UNIT/L (ref 17–59)
BASOPHILS # BLD AUTO: 0.02 X10(3)/MCL (ref 0.01–0.08)
BASOPHILS NFR BLD AUTO: 0.5 % (ref 0.1–1.2)
BILIRUB SERPL-MCNC: 4.3 MG/DL (ref 0–1)
BUN SERPL-MCNC: 11 MG/DL (ref 7–20)
CALCIUM SERPL-MCNC: 8.6 MG/DL (ref 8.4–10.2)
CHLORIDE SERPL-SCNC: 114 MMOL/L (ref 98–110)
CO2 SERPL-SCNC: 20 MMOL/L (ref 21–32)
CREAT SERPL-MCNC: 0.91 MG/DL (ref 0.66–1.25)
CREAT/UREA NIT SERPL: 12 (ref 12–20)
EOSINOPHIL # BLD AUTO: 0.12 X10(3)/MCL (ref 0.04–0.54)
EOSINOPHIL NFR BLD AUTO: 3.1 % (ref 0.7–7)
ERYTHROCYTE [DISTWIDTH] IN BLOOD BY AUTOMATED COUNT: 17.5 %
GFR SERPLBLD CREATININE-BSD FMLA CKD-EPI: >90 ML/MIN/1.73/M2
GLOBULIN SER-MCNC: 4.1 GM/DL (ref 2–3.9)
GLUCOSE SERPL-MCNC: 93 MG/DL (ref 70–115)
HCT VFR BLD AUTO: 26.4 % (ref 36–52)
HGB BLD-MCNC: 8.7 G/DL (ref 13–18)
IMM GRANULOCYTES # BLD AUTO: 0.01 X10(3)/MCL (ref 0–0.03)
IMM GRANULOCYTES NFR BLD AUTO: 0.3 % (ref 0–0.5)
LYMPHOCYTES # BLD AUTO: 0.85 X10(3)/MCL (ref 1.32–3.57)
LYMPHOCYTES NFR BLD AUTO: 22.2 % (ref 20–55)
MAGNESIUM SERPL-MCNC: 1.6 MG/DL (ref 1.8–2.4)
MCH RBC QN AUTO: 30.9 PG (ref 27–34)
MCHC RBC AUTO-ENTMCNC: 33 G/DL (ref 31–37)
MCV RBC AUTO: 93.6 FL (ref 79–99)
MONOCYTES # BLD AUTO: 0.6 X10(3)/MCL (ref 0.3–0.82)
MONOCYTES NFR BLD AUTO: 15.7 % (ref 4.7–12.5)
NEUTROPHILS # BLD AUTO: 2.23 X10(3)/MCL (ref 1.78–5.38)
NEUTROPHILS NFR BLD AUTO: 58.2 % (ref 37–73)
NRBC BLD AUTO-RTO: 0 %
PLATELET # BLD AUTO: 45 X10(3)/MCL (ref 140–371)
PMV BLD AUTO: 11.7 FL (ref 9.4–12.4)
POTASSIUM SERPL-SCNC: 4 MMOL/L (ref 3.5–5.1)
PROT SERPL-MCNC: 6.8 GM/DL (ref 6.3–8.2)
RBC # BLD AUTO: 2.82 X10(6)/MCL (ref 4–6)
SODIUM SERPL-SCNC: 138 MMOL/L (ref 136–145)
WBC # BLD AUTO: 3.83 X10(3)/MCL (ref 4–11.5)

## 2025-06-22 PROCEDURE — 80053 COMPREHEN METABOLIC PANEL: CPT | Performed by: FAMILY MEDICINE

## 2025-06-22 PROCEDURE — 85025 COMPLETE CBC W/AUTO DIFF WBC: CPT | Performed by: FAMILY MEDICINE

## 2025-06-22 PROCEDURE — 63600175 PHARM REV CODE 636 W HCPCS

## 2025-06-22 PROCEDURE — 83735 ASSAY OF MAGNESIUM: CPT | Performed by: FAMILY MEDICINE

## 2025-06-22 PROCEDURE — 82140 ASSAY OF AMMONIA: CPT | Performed by: FAMILY MEDICINE

## 2025-06-22 PROCEDURE — 63600175 PHARM REV CODE 636 W HCPCS: Performed by: HOSPITALIST

## 2025-06-22 PROCEDURE — 25000003 PHARM REV CODE 250: Performed by: FAMILY MEDICINE

## 2025-06-22 PROCEDURE — 87040 BLOOD CULTURE FOR BACTERIA: CPT | Performed by: FAMILY MEDICINE

## 2025-06-22 PROCEDURE — 20000000 HC ICU ROOM

## 2025-06-22 PROCEDURE — 25000003 PHARM REV CODE 250

## 2025-06-22 PROCEDURE — 27000221 HC OXYGEN, UP TO 24 HOURS

## 2025-06-22 PROCEDURE — 94761 N-INVAS EAR/PLS OXIMETRY MLT: CPT

## 2025-06-22 PROCEDURE — 63600175 PHARM REV CODE 636 W HCPCS: Performed by: FAMILY MEDICINE

## 2025-06-22 RX ORDER — TRIPROLIDINE/PSEUDOEPHEDRINE 2.5MG-60MG
600 TABLET ORAL EVERY 6 HOURS PRN
Status: DISCONTINUED | OUTPATIENT
Start: 2025-06-22 | End: 2025-07-03 | Stop reason: HOSPADM

## 2025-06-22 RX ADMIN — PROPRANOLOL HYDROCHLORIDE 20 MG: 20 TABLET ORAL at 08:06

## 2025-06-22 RX ADMIN — THIAMINE HCL TAB 100 MG 100 MG: 100 TAB at 06:06

## 2025-06-22 RX ADMIN — POTASSIUM CHLORIDE 40 MEQ: 1500 TABLET, EXTENDED RELEASE ORAL at 08:06

## 2025-06-22 RX ADMIN — IBUPROFEN 600 MG: 200 SUSPENSION ORAL at 03:06

## 2025-06-22 RX ADMIN — Medication 400 MG: at 08:06

## 2025-06-22 RX ADMIN — LORAZEPAM 2 MG: 2 INJECTION INTRAMUSCULAR; INTRAVENOUS at 04:06

## 2025-06-22 RX ADMIN — LORAZEPAM 2 MG: 2 INJECTION INTRAMUSCULAR; INTRAVENOUS at 09:06

## 2025-06-22 RX ADMIN — HYDRALAZINE HYDROCHLORIDE 10 MG: 20 INJECTION INTRAMUSCULAR; INTRAVENOUS at 03:06

## 2025-06-22 RX ADMIN — QUETIAPINE FUMARATE 100 MG: 100 TABLET ORAL at 08:06

## 2025-06-22 RX ADMIN — LACTULOSE 20 G: 20 SOLUTION ORAL at 08:06

## 2025-06-22 RX ADMIN — FAMOTIDINE 20 MG: 10 INJECTION, SOLUTION INTRAVENOUS at 08:06

## 2025-06-22 RX ADMIN — FAMOTIDINE 20 MG: 10 INJECTION, SOLUTION INTRAVENOUS at 09:06

## 2025-06-22 RX ADMIN — LOSARTAN POTASSIUM 50 MG: 50 TABLET, FILM COATED ORAL at 08:06

## 2025-06-22 NOTE — NURSING
Pt febrile with temp of 102.1 axillary, Dr Dove notified, pt remains very drowsy, arouses to voice, but quickly falls back asleep. No tremors or shortness of breath noted. Orders to be input in Epic to address temp by MD.

## 2025-06-22 NOTE — PROGRESS NOTES
Hospital Medicine  Progress Note    Patient Name: Yong Townsend  MRN: [id number]  Status: IP- Inpatient   Admission Date: 6/16/2025  Length of Stay: 6  Date of Service: 06/22/2025       CC: hospital follow-up for ams       SUBJECTIVE   60 years old male patient with unknown PMH but taking Trazodone, Baclofen, and Tizanidine was brought to the emergency room to be evaluated for unresponsiveness. Patient is currently pleasantly confused, spontaneously moving extremities in the bed but not following command verbally. Entire history is obtained from emergency and nursing report. I did try to reach to patients mother but unable to reach. As per the report patient was at [facility] this morning and went for the washroom around 9. His mother found him unresponsive in the washroom around 10:30 but for some reason she did not call 911 until 1:30 PM. She thought that patient will be responsive, but he did not so 911 was eventually called. On arrival CT scan of the head, CTA of the head and neck and MRI of the brain were obtained. Neurologist was also consulted. MRI of the brain is negative for any acute infarct, CT head is also negative for any acute findings as well as CT of the head and neck. Initial workup showed ammonia level of 166, hemoglobin 9.3, platelet count 75, sodium 141, potassium 3.9, and dimer of 0.85. Hie did get CTA head and neck so CTA chest was not obtained. On arrival patient's GCS level was only 8 and was only responsive to painful stimuli. During the course of the emergency room he has become more awake and GCS score improved. Before MRI patient was agitated so he required IV Versed and Lorazepam. Medicine team is asked to hospitalize the patient for further care. No fever or diarrhea or reported chest pain or SOB or tingling or numbness or slurred speech or focal weakness prior to unresponsiveness.      06/17/2025 pt admitted with hepatic encephalopathy, h/o significant ETOH use, drinks beer and whisky  according to his mom.  Work up in ER essentially negative.    Pt apparently has 2 epic charts with different MRN, [id number] and this chart [id number] in the original chart he has multiple admissions to [facility] in [place] for alcoholic cirrhosis, h/o esophageal banding and varicees back to 2018, ETOH use, Cocaine +, Marijuana + , Has reportedly seen Dr. Mon in the past, saw DR. Rodriguez in 2022 for varicees after being transferred to [facility] from [facility].  Pt with h/o Thrombocytopenia last 5/2025 was 23, received platelts in [place] at that time.  Mild Curly and anemia requiring transfusions in the past.  His ammonia was 150+ on admit here.  06/18/2025 pt more awake this am, improving mental staus, more sleepy this am  06/19/2025 pt with increased aggitation started mid morning yesterday back on iv ativan needed some precedex overnight due to combative behaviors, currently resting off precedex received iv ativan, on high dose thiamine and MVI iv bannana bags  06/20/2025 pt still confused ativan giving 2mg iv q 2 hrs, pt not taking po ativan  3/21/2025 started taking po meds today with breakfast  Will try adding po taper today   3/22/2025 more awake today but confused  Tolerating po intake   Held ativan this AM     Today: Patient seen and examined at bedside, and chart reviewed.       MEDICATIONS   Scheduled   cholecalciferol (vitamin D3)  50,000 Units Oral Weekly    famotidine (PF)  20 mg Intravenous Q12H    lactulose  20 g Oral Daily    lorazepam  2 mg Intravenous Q2H    losartan  50 mg Oral Daily    magnesium oxide  400 mg Oral BID    potassium chloride  40 mEq Oral Daily    propranoloL  20 mg Oral BID    QUEtiapine  100 mg Oral BID    thiamine  100 mg Oral Q8H     Continuous Infusions        PHYSICAL EXAM   VITALS: T 98.3 °F (36.8 °C)   BP (!) 158/96   P 84   RR 18   O2 100 %    GENERAL: Awake and in NAD  LUNGS: CTA B/L  CVS: Normal rate  GI/: Soft, NT, bowel sounds positive.  EXTREMITIES: No peripheral  edema  NEURO: AAOx3  PSYCH: Cooperative      LABS   CBC  Recent Labs     06/21/25  0459 06/22/25  0423   WBC 3.55* 3.83*   RBC 2.63* 2.82*   HGB 8.1* 8.7*   HCT 24.7* 26.4*   MCV 93.9 93.6   MCH 30.8 30.9   MCHC 32.8 33.0   RDW 17.5 17.5   PLT 42* 45*     CHEM  Recent Labs     06/21/25 0459 06/22/25  0423    138   K 3.8 4.0   CO2 17* 20*   BUN 10 11   CREATININE 0.97 0.91   CALCIUM 8.6 8.6   MG 1.60* 1.60*   ALBUMIN 2.5* 2.7*   GLOBULIN 3.9 4.1*   ALKPHOS 88 82   ALT 25 27   AST 61* 59   BILITOT 4.0* 4.3*         MICROBIOLOGY     Microbiology Results (last 7 days)       ** No results found for the last 168 hours. **              DIAGNOSTICS   US Abdomen Limited  Narrative: EXAMINATION:  STUDY: US ABDOMEN LIMITED    CLINICAL HISTORY AND TECHNIQUE:  Hepatic encephalopathy    COMPARISON:  None    FINDINGS:  The quality of this examination is very limited due to lack of patient cooperation and difficulty in proper positioning and control of respiration.    Liver:   The liver appears mildly atrophic with a slightly heterogeneous echogenic appearance but no worrisome focal parenchymal abnormalities are dilated biliary radicles.  There is adequate antegrade flow within the portal vein with no evidence of portal venous shunting.    Gallbladder/biliary system: The gallbladder is adequately distended with no evidence of cholelithiasis, thickening of the gallbladder wall or pericholecystic edema. The patient was not significantly tender while scanning over the gallbladder. The common bile duct, where visualized, is not dilated nor do I see evidence of intraductal stones.    Miscellaneous: N/a  Impression: 1. Less than optimal examination due to lack of patient cooperation, difficulty and proper positioning and control of respiration.  2. The liver appears mildly atrophic with a slightly heterogeneous echogenic appearance.  These findings are nonspecific but can be seen with cirrhosis and clinical correlation is  "recommended.    Electronically signed by: Nasir Aden  Date:    06/17/2025  Time:    07:32    Assessment and Plan      Syncope      Hepatic encephalopathy   Pt with aggressive and self injurious behaviors, still requiring ativan and restraints  Try adding po taper     Likely polypharmacy due to Baclofen,Tizanidine and Trazodone as well these meds are being held     Thrombocytopenia due to cirrhosis  The likely etiology of thrombocytopenia is liver disease. The patients 3 most recent labs are listed below.          Recent Labs     06/18/25  0348 06/19/25  0440   PLT 46* 36*      Plan  - Will transfuse if platelet count is <50k (if undergoing surgical procedure or have active bleeding).  -        H/o GI bleed and esophageal varicees s/p banding 2018     Alcoholic Cirrhosis continue ativan iv, precedex if needed  No seizures or h/o seizures per family  Continue thimine and MVI  Po ativan taper     Hypomagnesemia  Patient has Abnormal Magnesium: hypomagnesemia. Will continue to monitor electrolytes closely. Will replace the affected electrolytes and repeat labs to be done after interventions completed. The patient's magnesium results have been reviewed and are listed below.  No results for input(s): "MG" in the last 24 hours.  Give another 2gm mag this am     Anemia  Anemia is likely due to chronic blood loss and Iron deficiency. Most recent hemoglobin and hematocrit are listed below.          Recent Labs     06/18/25 0348 06/19/25  0440   HGB 8.3* 7.5*   HCT 25.2* 23.0*      Plan  - Monitor serial CBC: Daily  - Transfuse PRBC if patient becomes hemodynamically unstable, symptomatic or H/H drops below 7/21.  - Patient has not received any PRBC transfusions to date  - Patient's anemia is currently stable  -  check irone and b12, folate levels  Known h/o esophageal varicees  Occult +        HTN  Patients blood pressure range in the last 24 hours was: BP  Min: 68/46  Max: 200/115.The patient's inpatient anti-hypertensive " regimen is listed below:  Current Antihypertensives  , 2 times daily, Oral  propranoloL tablet 20 mg, 2 times daily, Oral  labetalol 20 mg/4 mL (5 mg/mL) IV syring, Every 4 hours PRN, Intravenous  hydrALAZINE injection 10 mg, Every 6 hours PRN, Intravenous  cloNIDine tablet 0.2 mg, Every 6 hours PRN, Oral  losartan tablet 50 mg, Daily, Oral     Plan  - BP is uncontrolled, will adjust as follows: add losartan 50mg daily  -          Fall precautions   Seizure precautions     Decrease ativan to 0.5mg q 6 hrs  Continue Lactulose po TID  Rechek ammonia level in am   PTOT consult   Will keep NPO until patient becomes fully alert.   Gentle IV fluid   UDS neg                  Pawel Dove MD  [facility]

## 2025-06-22 NOTE — PLAN OF CARE
Problem: Adult Inpatient Plan of Care  Goal: Absence of Hospital-Acquired Illness or Injury  Outcome: Progressing     Problem: Delirium  Goal: Improved Sleep  Outcome: Progressing     Problem: Fall Injury Risk  Goal: Absence of Fall and Fall-Related Injury  Outcome: Progressing     Problem: Restraint, Nonviolent  Goal: Absence of Harm or Injury  Outcome: Progressing     Problem: Infection  Goal: Absence of Infection Signs and Symptoms  Outcome: Progressing     Problem: Adult Inpatient Plan of Care  Goal: Plan of Care Review  Outcome: Not Progressing  Goal: Optimal Comfort and Wellbeing  Outcome: Not Progressing  Goal: Readiness for Transition of Care  Outcome: Not Progressing     Problem: Delirium  Goal: Optimal Coping  Outcome: Not Progressing  Goal: Improved Behavioral Control  Outcome: Not Progressing  Goal: Improved Attention and Thought Clarity  Outcome: Not Progressing

## 2025-06-22 NOTE — PLAN OF CARE
Problem: Adult Inpatient Plan of Care  Goal: Plan of Care Review  Outcome: Progressing  Goal: Patient-Specific Goal (Individualized)  Outcome: Progressing  Goal: Absence of Hospital-Acquired Illness or Injury  Outcome: Progressing  Goal: Optimal Comfort and Wellbeing  Outcome: Progressing  Goal: Readiness for Transition of Care  Outcome: Not Progressing     Problem: Delirium  Goal: Optimal Coping  Outcome: Progressing  Goal: Improved Behavioral Control  Outcome: Progressing  Goal: Improved Attention and Thought Clarity  Outcome: Not Progressing  Goal: Improved Sleep  Outcome: Progressing     Problem: Fall Injury Risk  Goal: Absence of Fall and Fall-Related Injury  Outcome: Progressing     Problem: Restraint, Nonviolent  Goal: Absence of Harm or Injury  Outcome: Progressing     Problem: Infection  Goal: Absence of Infection Signs and Symptoms  Outcome: Not Progressing  Intervention: Prevent or Manage Infection  Flowsheets (Taken 6/22/2025 5745)  Fever Reduction/Comfort Measures:   lightweight clothing   ice pack(s) applied  Infection Management: aseptic technique maintained

## 2025-06-22 NOTE — NURSING
Pt pulling on spencer tubing causing some bleeding,cont. To monitor, attempted to secure tubing and his hands so that he cannot reach the tubing,I explained to pt that he is in the hospital,in icu, that he cannot get out of bed, that he has a tube going into his penis/into his bladder for the urine to flow out,explained to him that by him pulling on the tube he is causing bleeding,pt confused,no evidence of understanding. Pt did take his night meds,drank some sprite and ate some vanilla pudding.

## 2025-06-23 LAB
ALBUMIN SERPL-MCNC: 2.4 G/DL (ref 3.4–5)
ALBUMIN/GLOB SERPL: 0.6 RATIO
ALP SERPL-CCNC: 74 UNIT/L (ref 50–144)
ALT SERPL-CCNC: 24 UNIT/L (ref 1–45)
AMMONIA PLAS-MSCNC: 74 UMOL/L (ref 11–32)
ANION GAP SERPL CALC-SCNC: 2 MEQ/L (ref 2–13)
AST SERPL-CCNC: 55 UNIT/L (ref 17–59)
BASOPHILS # BLD AUTO: 0.02 X10(3)/MCL (ref 0.01–0.08)
BASOPHILS NFR BLD AUTO: 0.7 % (ref 0.1–1.2)
BILIRUB SERPL-MCNC: 4.5 MG/DL (ref 0–1)
BUN SERPL-MCNC: 14 MG/DL (ref 7–20)
BURR CELLS (OLG): SLIGHT
CALCIUM SERPL-MCNC: 8.6 MG/DL (ref 8.4–10.2)
CHLORIDE SERPL-SCNC: 116 MMOL/L (ref 98–110)
CO2 SERPL-SCNC: 19 MMOL/L (ref 21–32)
CREAT SERPL-MCNC: 0.92 MG/DL (ref 0.66–1.25)
CREAT/UREA NIT SERPL: 15 (ref 12–20)
EOSINOPHIL # BLD AUTO: 0.01 X10(3)/MCL (ref 0.04–0.54)
EOSINOPHIL NFR BLD AUTO: 0.4 % (ref 0.7–7)
ERYTHROCYTE [DISTWIDTH] IN BLOOD BY AUTOMATED COUNT: 17.2 %
GFR SERPLBLD CREATININE-BSD FMLA CKD-EPI: >90 ML/MIN/1.73/M2
GLOBULIN SER-MCNC: 4 GM/DL (ref 2–3.9)
GLUCOSE SERPL-MCNC: 87 MG/DL (ref 70–115)
HCT VFR BLD AUTO: 25.2 % (ref 36–52)
HGB BLD-MCNC: 8.6 G/DL (ref 13–18)
IMM GRANULOCYTES # BLD AUTO: 0.03 X10(3)/MCL (ref 0–0.03)
IMM GRANULOCYTES NFR BLD AUTO: 1.1 % (ref 0–0.5)
LYMPHOCYTES # BLD AUTO: 0.21 X10(3)/MCL (ref 1.32–3.57)
LYMPHOCYTES NFR BLD AUTO: 7.7 % (ref 20–55)
MAGNESIUM SERPL-MCNC: 1.7 MG/DL (ref 1.8–2.4)
MCH RBC QN AUTO: 30.8 PG (ref 27–34)
MCHC RBC AUTO-ENTMCNC: 34.1 G/DL (ref 31–37)
MCV RBC AUTO: 90.3 FL (ref 79–99)
MONOCYTES # BLD AUTO: 0.43 X10(3)/MCL (ref 0.3–0.82)
MONOCYTES NFR BLD AUTO: 15.8 % (ref 4.7–12.5)
NEUTROPHILS # BLD AUTO: 2.03 X10(3)/MCL (ref 1.78–5.38)
NEUTROPHILS NFR BLD AUTO: 74.3 % (ref 37–73)
NRBC BLD AUTO-RTO: 0 %
PLATELET # BLD AUTO: 36 X10(3)/MCL (ref 140–371)
PLATELET # BLD EST: ABNORMAL 10*3/UL
PMV BLD AUTO: ABNORMAL FL
POIKILOCYTOSIS BLD QL SMEAR: ABNORMAL
POTASSIUM SERPL-SCNC: 4.1 MMOL/L (ref 3.5–5.1)
PROT SERPL-MCNC: 6.4 GM/DL (ref 6.3–8.2)
RBC # BLD AUTO: 2.79 X10(6)/MCL (ref 4–6)
SCHISTOCYTE (OLG): SLIGHT
SODIUM SERPL-SCNC: 137 MMOL/L (ref 136–145)
WBC # BLD AUTO: 2.73 X10(3)/MCL (ref 4–11.5)

## 2025-06-23 PROCEDURE — 83735 ASSAY OF MAGNESIUM: CPT | Performed by: FAMILY MEDICINE

## 2025-06-23 PROCEDURE — 25000003 PHARM REV CODE 250: Performed by: FAMILY MEDICINE

## 2025-06-23 PROCEDURE — 80053 COMPREHEN METABOLIC PANEL: CPT | Performed by: FAMILY MEDICINE

## 2025-06-23 PROCEDURE — 82140 ASSAY OF AMMONIA: CPT | Performed by: FAMILY MEDICINE

## 2025-06-23 PROCEDURE — 85025 COMPLETE CBC W/AUTO DIFF WBC: CPT | Performed by: FAMILY MEDICINE

## 2025-06-23 PROCEDURE — 25000003 PHARM REV CODE 250

## 2025-06-23 PROCEDURE — 63600175 PHARM REV CODE 636 W HCPCS

## 2025-06-23 PROCEDURE — 27000221 HC OXYGEN, UP TO 24 HOURS

## 2025-06-23 PROCEDURE — 94761 N-INVAS EAR/PLS OXIMETRY MLT: CPT

## 2025-06-23 PROCEDURE — 20000000 HC ICU ROOM

## 2025-06-23 RX ORDER — LORAZEPAM 2 MG/ML
2 INJECTION INTRAMUSCULAR
Status: DISCONTINUED | OUTPATIENT
Start: 2025-06-23 | End: 2025-07-03 | Stop reason: HOSPADM

## 2025-06-23 RX ADMIN — Medication 400 MG: at 09:06

## 2025-06-23 RX ADMIN — THIAMINE HCL TAB 100 MG 100 MG: 100 TAB at 03:06

## 2025-06-23 RX ADMIN — IBUPROFEN 600 MG: 200 SUSPENSION ORAL at 03:06

## 2025-06-23 RX ADMIN — LACTULOSE 20 G: 20 SOLUTION ORAL at 03:06

## 2025-06-23 RX ADMIN — PROPRANOLOL HYDROCHLORIDE 20 MG: 20 TABLET ORAL at 09:06

## 2025-06-23 RX ADMIN — POTASSIUM CHLORIDE 40 MEQ: 1500 TABLET, EXTENDED RELEASE ORAL at 09:06

## 2025-06-23 RX ADMIN — THIAMINE HCL TAB 100 MG 100 MG: 100 TAB at 09:06

## 2025-06-23 RX ADMIN — LOSARTAN POTASSIUM 50 MG: 50 TABLET, FILM COATED ORAL at 09:06

## 2025-06-23 RX ADMIN — LACTULOSE 20 G: 20 SOLUTION ORAL at 09:06

## 2025-06-23 RX ADMIN — FAMOTIDINE 20 MG: 10 INJECTION, SOLUTION INTRAVENOUS at 09:06

## 2025-06-23 NOTE — PHYSICIAN QUERY
Please clarify/provide the diagnosis associated with the clinical findings.  Toxic encephalopathy

## 2025-06-23 NOTE — PLAN OF CARE
Problem: Adult Inpatient Plan of Care  Goal: Absence of Hospital-Acquired Illness or Injury  Outcome: Progressing  Goal: Optimal Comfort and Wellbeing  Outcome: Progressing  Goal: Readiness for Transition of Care  Outcome: Progressing     Problem: Delirium  Goal: Optimal Coping  Outcome: Progressing  Goal: Improved Behavioral Control  Outcome: Progressing  Goal: Improved Sleep  Outcome: Progressing     Problem: Fall Injury Risk  Goal: Absence of Fall and Fall-Related Injury  Outcome: Progressing     Problem: Restraint, Nonviolent  Goal: Absence of Harm or Injury  Outcome: Progressing     Problem: Skin Injury Risk Increased  Goal: Skin Health and Integrity  Outcome: Progressing     Problem: Adult Inpatient Plan of Care  Goal: Plan of Care Review  Outcome: Not Progressing     Problem: Delirium  Goal: Improved Attention and Thought Clarity  Outcome: Not Progressing     Problem: Infection  Goal: Absence of Infection Signs and Symptoms  Outcome: Not Progressing

## 2025-06-23 NOTE — NURSING
"Pt arouses to speech,does not open his eyes,pt weakly pulling at restraints,pt not alert enough to safely take oral meds,also temp elevated at 101.0 ax,blood cultures ordered,cool cloth placed on forehead,fan placed on bedside table,continue to monitor. O2 sats 88-90% on room air,placed pt back on nasal cannula 1L,o2 sats up to 100%.  @2145:lab at bedside attempting to draw blood cultures,pt jerking his arm away and saying "stop it",eyes remain closed while talking/jerking away. Blood culures drawn successfully after holding pt's arm down.  6/23/2025@0430:lab at bedside for blood draw,pt remained calm,awake but drowsy;after lab finished I cleaned patient and changed linen,pt remained calm,I asked pt if he knew where he was and he said, "hospital", he could not give month or correct year, he stated year as "2026",after bath pt going back to sleep.  "

## 2025-06-23 NOTE — PLAN OF CARE
Mother at BS  Problem: Adult Inpatient Plan of Care  Goal: Plan of Care Review  Outcome: Progressing  Goal: Patient-Specific Goal (Individualized)  Outcome: Progressing  Goal: Absence of Hospital-Acquired Illness or Injury  Outcome: Progressing  Goal: Optimal Comfort and Wellbeing  Outcome: Progressing  Goal: Readiness for Transition of Care  Outcome: Progressing     Problem: Delirium  Goal: Optimal Coping  Outcome: Progressing  Goal: Improved Behavioral Control  Outcome: Progressing  Goal: Improved Attention and Thought Clarity  Outcome: Progressing  Goal: Improved Sleep  Outcome: Progressing     Problem: Restraint, Nonviolent  Goal: Absence of Harm or Injury  Outcome: Progressing     Problem: Infection  Goal: Absence of Infection Signs and Symptoms  Outcome: Progressing     Problem: Skin Injury Risk Increased  Goal: Skin Health and Integrity  Outcome: Progressing

## 2025-06-23 NOTE — PROGRESS NOTES
Hospital Medicine  Progress Note    Patient Name: Yong Townsend  MRN: [id number]  Status: IP- Inpatient   Admission Date: 6/16/2025  Length of Stay: 7  Date of Service: 06/23/2025       CC: hospital follow-up for ams       SUBJECTIVE   60 years old male patient with unknown PMH but taking Trazodone, Baclofen, and Tizanidine was brought to the emergency room to be evaluated for unresponsiveness. Patient is currently pleasantly confused, spontaneously moving extremities in the bed but not following command verbally. Entire history is obtained from emergency and nursing report. I did try to reach to patients mother but unable to reach. As per the report patient was at [facility] this morning and went for the washroom around 9. His mother found him unresponsive in the washroom around 10:30 but for some reason she did not call 911 until 1:30 PM. She thought that patient will be responsive, but he did not so 911 was eventually called. On arrival CT scan of the head, CTA of the head and neck and MRI of the brain were obtained. Neurologist was also consulted. MRI of the brain is negative for any acute infarct, CT head is also negative for any acute findings as well as CT of the head and neck. Initial workup showed ammonia level of 166, hemoglobin 9.3, platelet count 75, sodium 141, potassium 3.9, and dimer of 0.85. Hie did get CTA head and neck so CTA chest was not obtained. On arrival patient's GCS level was only 8 and was only responsive to painful stimuli. During the course of the emergency room he has become more awake and GCS score improved. Before MRI patient was agitated so he required IV Versed and Lorazepam. Medicine team is asked to hospitalize the patient for further care. No fever or diarrhea or reported chest pain or SOB or tingling or numbness or slurred speech or focal weakness prior to unresponsiveness.      06/17/2025 pt admitted with hepatic encephalopathy, h/o significant ETOH use, drinks beer and whisky  according to his mom.  Work up in ER essentially negative.    Pt apparently has 2 epic charts with different MRN, [id number] and this chart [id number] in the original chart he has multiple admissions to [facility] in [place] for alcoholic cirrhosis, h/o esophageal banding and varicees back to 2018, ETOH use, Cocaine +, Marijuana + , Has reportedly seen Dr. Mon in the past, saw DR. Rodriguez in 2022 for varicees after being transferred to [facility] from [facility].  Pt with h/o Thrombocytopenia last 5/2025 was 23, received platelts in [place] at that time.  Mild Curly and anemia requiring transfusions in the past.  His ammonia was 150+ on admit here.  06/18/2025 pt more awake this am, improving mental staus, more sleepy this am  06/19/2025 pt with increased aggitation started mid morning yesterday back on iv ativan needed some precedex overnight due to combative behaviors, currently resting off precedex received iv ativan, on high dose thiamine and MVI iv bannana bags  06/20/2025 pt still confused ativan giving 2mg iv q 2 hrs, pt not taking po ativan  3/21/2025 started taking po meds today with breakfast  Will try adding po taper today   3/22/2025 more awake today but confused  Tolerating po intake   Held ativan this AM   6/23/2025 more responsive today  But confused ammonia level back up today to 74  Will increase lactulose       Today: Patient seen and examined at bedside, and chart reviewed.       MEDICATIONS   Scheduled   cholecalciferol (vitamin D3)  50,000 Units Oral Weekly    famotidine (PF)  20 mg Intravenous Q12H    lactulose  20 g Oral Daily    losartan  50 mg Oral Daily    magnesium oxide  400 mg Oral BID    potassium chloride  40 mEq Oral Daily    propranoloL  20 mg Oral BID    thiamine  100 mg Oral Q8H     Continuous Infusions        PHYSICAL EXAM   VITALS: T 98.4 °F (36.9 °C)   /74   P 82   RR 10   O2 100 %    GENERAL: Awake and in NAD  LUNGS: CTA B/L  CVS: Normal rate  GI/: Soft, NT, bowel sounds  positive.  EXTREMITIES: No peripheral edema  NEURO: AAOx3  PSYCH: Cooperative      LABS   CBC  Recent Labs     06/22/25 0423 06/23/25 0437   WBC 3.83* 2.73*   RBC 2.82* 2.79*   HGB 8.7* 8.6*   HCT 26.4* 25.2*   MCV 93.6 90.3   MCH 30.9 30.8   MCHC 33.0 34.1   RDW 17.5 17.2   PLT 45* 36*     CHEM  Recent Labs     06/22/25 0423 06/23/25 0437    137   K 4.0 4.1   CO2 20* 19*   BUN 11 14   CREATININE 0.91 0.92   CALCIUM 8.6 8.6   MG 1.60* 1.70*   ALBUMIN 2.7* 2.4*   GLOBULIN 4.1* 4.0*   ALKPHOS 82 74   ALT 27 24   AST 59 55   BILITOT 4.3* 4.5*         MICROBIOLOGY     Microbiology Results (last 7 days)       Procedure Component Value Units Date/Time    Blood Culture [[id number]] Collected: 06/22/25 2150    Order Status: Resulted Specimen: Blood from Arm Updated: 06/22/25 2154    Blood Culture [[id number]] Collected: 06/22/25 2150    Order Status: Resulted Specimen: Blood from Arm Updated: 06/22/25 2153              DIAGNOSTICS   US Abdomen Limited  Narrative: EXAMINATION:  STUDY: US ABDOMEN LIMITED    CLINICAL HISTORY AND TECHNIQUE:  Hepatic encephalopathy    COMPARISON:  None    FINDINGS:  The quality of this examination is very limited due to lack of patient cooperation and difficulty in proper positioning and control of respiration.    Liver:   The liver appears mildly atrophic with a slightly heterogeneous echogenic appearance but no worrisome focal parenchymal abnormalities are dilated biliary radicles.  There is adequate antegrade flow within the portal vein with no evidence of portal venous shunting.    Gallbladder/biliary system: The gallbladder is adequately distended with no evidence of cholelithiasis, thickening of the gallbladder wall or pericholecystic edema. The patient was not significantly tender while scanning over the gallbladder. The common bile duct, where visualized, is not dilated nor do I see evidence of intraductal stones.    Miscellaneous: N/a  Impression: 1. Less than optimal  "examination due to lack of patient cooperation, difficulty and proper positioning and control of respiration.  2. The liver appears mildly atrophic with a slightly heterogeneous echogenic appearance.  These findings are nonspecific but can be seen with cirrhosis and clinical correlation is recommended.    Electronically signed by: Nasir Aden  Date:    06/17/2025  Time:    07:32      Assessment and Plan      Syncope      Hepatic encephalopathy   Ammonia level up to 73 today  Increase lactulose to TID  Repeat am level   Pt with aggressive and self injurious behaviors, still requiring ativan and restraints  Try adding po taper     Likely polypharmacy due to Baclofen,Tizanidine and Trazodone as well these meds are being held     Thrombocytopenia due to cirrhosis  The likely etiology of thrombocytopenia is liver disease. The patients 3 most recent labs are listed below.          Recent Labs     06/18/25 0348 06/19/25  0440   PLT 46* 36*      Plan  - Will transfuse if platelet count is <50k (if undergoing surgical procedure or have active bleeding).  -        H/o GI bleed and esophageal varicees s/p banding 2018     Alcoholic Cirrhosis continue ativan iv, precedex if needed  No seizures or h/o seizures per family  Continue thimine and MVI  Po ativan taper     Hypomagnesemia  Patient has Abnormal Magnesium: hypomagnesemia. Will continue to monitor electrolytes closely. Will replace the affected electrolytes and repeat labs to be done after interventions completed. The patient's magnesium results have been reviewed and are listed below.  No results for input(s): "MG" in the last 24 hours.  Give another 2gm mag this am     Anemia  Anemia is likely due to chronic blood loss and Iron deficiency. Most recent hemoglobin and hematocrit are listed below.          Recent Labs     06/18/25 0348 06/19/25  0440   HGB 8.3* 7.5*   HCT 25.2* 23.0*      Plan  - Monitor serial CBC: Daily  - Transfuse PRBC if patient becomes " hemodynamically unstable, symptomatic or H/H drops below 7/21.  - Patient has not received any PRBC transfusions to date  - Patient's anemia is currently stable  -  check irone and b12, folate levels  Known h/o esophageal varicees  Occult +        HTN  Patients blood pressure range in the last 24 hours was: BP  Min: 68/46  Max: 200/115.The patient's inpatient anti-hypertensive regimen is listed below:  Current Antihypertensives  , 2 times daily, Oral  propranoloL tablet 20 mg, 2 times daily, Oral  labetalol 20 mg/4 mL (5 mg/mL) IV syring, Every 4 hours PRN, Intravenous  hydrALAZINE injection 10 mg, Every 6 hours PRN, Intravenous  cloNIDine tablet 0.2 mg, Every 6 hours PRN, Oral  losartan tablet 50 mg, Daily, Oral     Plan  - BP is uncontrolled, will adjust as follows: add losartan 50mg daily  -          Fall precautions   Seizure precautions     Decrease ativan to 0.5mg q 6 hrs  Continue Lactulose po TID  Rechek ammonia level in am   PTOT consult   Will keep NPO until patient becomes fully alert.   Gentle IV fluid   UDS neg                Pawel Dove MD  [facility]

## 2025-06-24 LAB — AMMONIA PLAS-MSCNC: 30 UMOL/L (ref 11–32)

## 2025-06-24 PROCEDURE — 25000003 PHARM REV CODE 250

## 2025-06-24 PROCEDURE — 82140 ASSAY OF AMMONIA: CPT | Performed by: FAMILY MEDICINE

## 2025-06-24 PROCEDURE — 97161 PT EVAL LOW COMPLEX 20 MIN: CPT

## 2025-06-24 PROCEDURE — 20000000 HC ICU ROOM

## 2025-06-24 PROCEDURE — 63600175 PHARM REV CODE 636 W HCPCS

## 2025-06-24 PROCEDURE — 63600175 PHARM REV CODE 636 W HCPCS: Performed by: FAMILY MEDICINE

## 2025-06-24 PROCEDURE — 25000003 PHARM REV CODE 250: Performed by: FAMILY MEDICINE

## 2025-06-24 PROCEDURE — 94761 N-INVAS EAR/PLS OXIMETRY MLT: CPT

## 2025-06-24 RX ADMIN — LORAZEPAM 2 MG: 2 INJECTION, SOLUTION INTRAMUSCULAR; INTRAVENOUS at 09:06

## 2025-06-24 RX ADMIN — PROPRANOLOL HYDROCHLORIDE 20 MG: 20 TABLET ORAL at 09:06

## 2025-06-24 RX ADMIN — THIAMINE HCL TAB 100 MG 100 MG: 100 TAB at 08:06

## 2025-06-24 RX ADMIN — LACTULOSE 20 G: 20 SOLUTION ORAL at 08:06

## 2025-06-24 RX ADMIN — POTASSIUM CHLORIDE 40 MEQ: 1500 TABLET, EXTENDED RELEASE ORAL at 09:06

## 2025-06-24 RX ADMIN — FAMOTIDINE 20 MG: 10 INJECTION, SOLUTION INTRAVENOUS at 09:06

## 2025-06-24 RX ADMIN — THIAMINE HCL TAB 100 MG 100 MG: 100 TAB at 05:06

## 2025-06-24 RX ADMIN — FAMOTIDINE 20 MG: 10 INJECTION, SOLUTION INTRAVENOUS at 08:06

## 2025-06-24 RX ADMIN — THIAMINE HCL TAB 100 MG 100 MG: 100 TAB at 01:06

## 2025-06-24 RX ADMIN — Medication 400 MG: at 08:06

## 2025-06-24 RX ADMIN — LACTULOSE 20 G: 20 SOLUTION ORAL at 02:06

## 2025-06-24 RX ADMIN — LOSARTAN POTASSIUM 50 MG: 50 TABLET, FILM COATED ORAL at 09:06

## 2025-06-24 RX ADMIN — Medication 400 MG: at 09:06

## 2025-06-24 RX ADMIN — LACTULOSE 20 G: 20 SOLUTION ORAL at 09:06

## 2025-06-24 RX ADMIN — PROPRANOLOL HYDROCHLORIDE 20 MG: 20 TABLET ORAL at 08:06

## 2025-06-24 NOTE — PLAN OF CARE
Problem: Physical Therapy  Goal: Physical Therapy Goal  Description: Goals to be met by: discharge     Patient will increase functional independence with mobility by performin. Supine to sit with Stand-by Assistance  2. Sit to stand transfer with Stand-by Assistance  3. Gait  x 75 feet with Stand-by Assistance using No Assistive Device.     Outcome: Progressing

## 2025-06-24 NOTE — PROGRESS NOTES
Hospital Medicine  Progress Note    Patient Name: Yong Townsend  MRN: [id number]  Status: IP- Inpatient   Admission Date: 6/16/2025  Length of Stay: 8  Date of Service: 06/24/2025     CC: hospital follow-up for ams         SUBJECTIVE   60 years old male patient with unknown PMH but taking Trazodone, Baclofen, and Tizanidine was brought to the emergency room to be evaluated for unresponsiveness. Patient is currently pleasantly confused, spontaneously moving extremities in the bed but not following command verbally. Entire history is obtained from emergency and nursing report. I did try to reach to patients mother but unable to reach. As per the report patient was at [facility] this morning and went for the washroom around 9. His mother found him unresponsive in the washroom around 10:30 but for some reason she did not call 911 until 1:30 PM. She thought that patient will be responsive, but he did not so 911 was eventually called. On arrival CT scan of the head, CTA of the head and neck and MRI of the brain were obtained. Neurologist was also consulted. MRI of the brain is negative for any acute infarct, CT head is also negative for any acute findings as well as CT of the head and neck. Initial workup showed ammonia level of 166, hemoglobin 9.3, platelet count 75, sodium 141, potassium 3.9, and dimer of 0.85. Hie did get CTA head and neck so CTA chest was not obtained. On arrival patient's GCS level was only 8 and was only responsive to painful stimuli. During the course of the emergency room he has become more awake and GCS score improved. Before MRI patient was agitated so he required IV Versed and Lorazepam. Medicine team is asked to hospitalize the patient for further care. No fever or diarrhea or reported chest pain or SOB or tingling or numbness or slurred speech or focal weakness prior to unresponsiveness.      06/17/2025 pt admitted with hepatic encephalopathy, h/o significant ETOH use, drinks beer and whisky  according to his mom.  Work up in ER essentially negative.    Pt apparently has 2 epic charts with different MRN, [id number] and this chart [id number] in the original chart he has multiple admissions to [facility] in [place] for alcoholic cirrhosis, h/o esophageal banding and varicees back to 2018, ETOH use, Cocaine +, Marijuana + , Has reportedly seen Dr. Mon in the past, saw DR. Rodriguez in 2022 for varicees after being transferred to [facility] from [facility].  Pt with h/o Thrombocytopenia last 5/2025 was 23, received platelts in [place] at that time.  Mild Curly and anemia requiring transfusions in the past.  His ammonia was 150+ on admit here.  06/18/2025 pt more awake this am, improving mental staus, more sleepy this am  06/19/2025 pt with increased aggitation started mid morning yesterday back on iv ativan needed some precedex overnight due to combative behaviors, currently resting off precedex received iv ativan, on high dose thiamine and MVI iv bannana bags  06/20/2025 pt still confused ativan giving 2mg iv q 2 hrs, pt not taking po ativan  3/21/2025 started taking po meds today with breakfast  Will try adding po taper today   3/22/2025 more awake today but confused  Tolerating po intake   Held ativan this AM   6/23/2025 more responsive today  But confused ammonia level back up today to 74  Will increase lactulose   6/24/2025 ammonia down to 30  Remains pretty weak  But mental status much improved     Today: Patient seen and examined at bedside, and chart reviewed.       MEDICATIONS   Scheduled   cholecalciferol (vitamin D3)  50,000 Units Oral Weekly    famotidine (PF)  20 mg Intravenous Q12H    lactulose  20 g Oral TID    losartan  50 mg Oral Daily    magnesium oxide  400 mg Oral BID    potassium chloride  40 mEq Oral Daily    propranoloL  20 mg Oral BID    thiamine  100 mg Oral Q8H     Continuous Infusions        PHYSICAL EXAM   VITALS: T 97.7 °F (36.5 °C)   /83   P 80   RR 13   O2 95 %    GENERAL: Awake  and in NAD  LUNGS: CTA B/L  CVS: Normal rate  GI/: Soft, NT, bowel sounds positive.  EXTREMITIES: No peripheral edema  NEURO: AAOx3  PSYCH: Cooperative      LABS   CBC  Recent Labs     06/22/25 0423 06/23/25 0437   WBC 3.83* 2.73*   RBC 2.82* 2.79*   HGB 8.7* 8.6*   HCT 26.4* 25.2*   MCV 93.6 90.3   MCH 30.9 30.8   MCHC 33.0 34.1   RDW 17.5 17.2   PLT 45* 36*     CHEM  Recent Labs     06/22/25  0423 06/23/25 0437    137   K 4.0 4.1   CO2 20* 19*   BUN 11 14   CREATININE 0.91 0.92   CALCIUM 8.6 8.6   MG 1.60* 1.70*   ALBUMIN 2.7* 2.4*   GLOBULIN 4.1* 4.0*   ALKPHOS 82 74   ALT 27 24   AST 59 55   BILITOT 4.3* 4.5*         MICROBIOLOGY     Microbiology Results (last 7 days)       Procedure Component Value Units Date/Time    Blood Culture [[id number]] Collected: 06/22/25 2150    Order Status: Completed Specimen: Blood from Arm Updated: 06/23/25 2301     Blood Culture No Growth At 24 Hours    Blood Culture [[id number]] Collected: 06/22/25 2150    Order Status: Completed Specimen: Blood from Arm Updated: 06/23/25 2301     Blood Culture No Growth At 24 Hours              DIAGNOSTICS   US Abdomen Limited  Narrative: EXAMINATION:  STUDY: US ABDOMEN LIMITED    CLINICAL HISTORY AND TECHNIQUE:  Hepatic encephalopathy    COMPARISON:  None    FINDINGS:  The quality of this examination is very limited due to lack of patient cooperation and difficulty in proper positioning and control of respiration.    Liver:   The liver appears mildly atrophic with a slightly heterogeneous echogenic appearance but no worrisome focal parenchymal abnormalities are dilated biliary radicles.  There is adequate antegrade flow within the portal vein with no evidence of portal venous shunting.    Gallbladder/biliary system: The gallbladder is adequately distended with no evidence of cholelithiasis, thickening of the gallbladder wall or pericholecystic edema. The patient was not significantly tender while scanning over the gallbladder. The  "common bile duct, where visualized, is not dilated nor do I see evidence of intraductal stones.    Miscellaneous: N/a  Impression: 1. Less than optimal examination due to lack of patient cooperation, difficulty and proper positioning and control of respiration.  2. The liver appears mildly atrophic with a slightly heterogeneous echogenic appearance.  These findings are nonspecific but can be seen with cirrhosis and clinical correlation is recommended.    Electronically signed by: Nasir Aden  Date:    06/17/2025  Time:    07:32      Assessment and Plan      Syncope      Hepatic encephalopathy   Ammonia level up to 73 today  Increase lactulose to TID  Repeat am level   Pt with aggressive and self injurious behaviors, still requiring ativan and restraints  Try adding po taper     Likely polypharmacy due to Baclofen,Tizanidine and Trazodone as well these meds are being held     Thrombocytopenia due to cirrhosis  The likely etiology of thrombocytopenia is liver disease. The patients 3 most recent labs are listed below.          Recent Labs     06/18/25  0348 06/19/25  0440   PLT 46* 36*      Plan  - Will transfuse if platelet count is <50k (if undergoing surgical procedure or have active bleeding).  -        H/o GI bleed and esophageal varicees s/p banding 2018     Alcoholic Cirrhosis continue ativan iv, precedex if needed  No seizures or h/o seizures per family  Continue thimine and MVI  Po ativan taper     Hypomagnesemia  Patient has Abnormal Magnesium: hypomagnesemia. Will continue to monitor electrolytes closely. Will replace the affected electrolytes and repeat labs to be done after interventions completed. The patient's magnesium results have been reviewed and are listed below.  No results for input(s): "MG" in the last 24 hours.  Give another 2gm mag this am     Anemia  Anemia is likely due to chronic blood loss and Iron deficiency. Most recent hemoglobin and hematocrit are listed below.          Recent Labs     " 06/18/25  0348 06/19/25  0440   HGB 8.3* 7.5*   HCT 25.2* 23.0*      Plan  - Monitor serial CBC: Daily  - Transfuse PRBC if patient becomes hemodynamically unstable, symptomatic or H/H drops below 7/21.  - Patient has not received any PRBC transfusions to date  - Patient's anemia is currently stable  -  check irone and b12, folate levels  Known h/o esophageal varicees  Occult +        HTN  Patients blood pressure range in the last 24 hours was: BP  Min: 68/46  Max: 200/115.The patient's inpatient anti-hypertensive regimen is listed below:  Current Antihypertensives  , 2 times daily, Oral  propranoloL tablet 20 mg, 2 times daily, Oral  labetalol 20 mg/4 mL (5 mg/mL) IV syring, Every 4 hours PRN, Intravenous  hydrALAZINE injection 10 mg, Every 6 hours PRN, Intravenous  cloNIDine tablet 0.2 mg, Every 6 hours PRN, Oral  losartan tablet 50 mg, Daily, Oral     Plan  - BP is uncontrolled, will adjust as follows: add losartan 50mg daily          Fall precautions   Seizure precautions     Decrease ativan to 0.5mg q 6 hrs  Continue Lactulose po TID  Rechek ammonia level in am   Cont PT  Case mgmt- rehab consult   Will keep NPO until patient becomes fully alert.   Gentle IV fluid   UDS neg             Pawel Dove MD  [facility] Medicine

## 2025-06-24 NOTE — PLAN OF CARE
Problem: Adult Inpatient Plan of Care  Goal: Plan of Care Review  Outcome: Progressing  Goal: Patient-Specific Goal (Individualized)  Outcome: Progressing  Goal: Absence of Hospital-Acquired Illness or Injury  Outcome: Progressing  Goal: Optimal Comfort and Wellbeing  Outcome: Progressing  Goal: Readiness for Transition of Care  Outcome: Progressing     Problem: Delirium  Goal: Optimal Coping  Outcome: Progressing  Goal: Improved Behavioral Control  Outcome: Progressing  Goal: Improved Attention and Thought Clarity  Outcome: Progressing  Goal: Improved Sleep  Outcome: Progressing     Problem: Fall Injury Risk  Goal: Absence of Fall and Fall-Related Injury  Outcome: Progressing     Problem: Infection  Goal: Absence of Infection Signs and Symptoms  Outcome: Progressing     Problem: Skin Injury Risk Increased  Goal: Skin Health and Integrity  Outcome: Progressing

## 2025-06-24 NOTE — PLAN OF CARE
Problem: Adult Inpatient Plan of Care  Goal: Plan of Care Review  Outcome: Progressing  Goal: Patient-Specific Goal (Individualized)  Outcome: Progressing  Goal: Absence of Hospital-Acquired Illness or Injury  Outcome: Progressing  Goal: Optimal Comfort and Wellbeing  Outcome: Progressing  Goal: Readiness for Transition of Care  Outcome: Progressing     Problem: Delirium  Goal: Optimal Coping  Outcome: Progressing  Goal: Improved Behavioral Control  Outcome: Progressing  Goal: Improved Attention and Thought Clarity  Outcome: Progressing  Goal: Improved Sleep  Outcome: Progressing     Problem: Fall Injury Risk  Goal: Absence of Fall and Fall-Related Injury  Outcome: Progressing     Problem: Restraint, Nonviolent  Goal: Absence of Harm or Injury  Outcome: Met     Problem: Infection  Goal: Absence of Infection Signs and Symptoms  Outcome: Progressing     Problem: Skin Injury Risk Increased  Goal: Skin Health and Integrity  Outcome: Progressing

## 2025-06-24 NOTE — PROGRESS NOTES
Inpatient Nutrition Assessment    Admit Date: 6/16/2025   Total duration of encounter: 8 days   Patient Age: 60 y.o.    Nutrition Recommendation/Prescription     Continue Regular diet as tolerated.    Recommend Boost Plus BID to better meet nutritional needs (360 kcal, 14 gm pro ea).     Continue Vit. D3 and Thiamine supplementation as medically appropriate.     Continue to encourage PO intake, assist with feeds, and honor patient preferences.    Dietitian will monitor Electrolytes, Food and Beverage Intake, Mental Status, Weight, and Weight Change and adjust MNT as needed.     Monitoring & Evaluation       Reason Seen: length of stay    Nutrition Risk/Follow-Up: low (follow-up in 5-7 days)    Next Date to be Seen by RD: 07/29/25  Please consult if re-assessment needed sooner.      Nutrition Assessment     Malnutrition Assessment/Nutrition-Focused Physical Exam       Malnutrition Level: other (see comments) (Unable to assess) (06/24/25 0057)  Energy Intake (Malnutrition): less than or equal to 50% for greater than or equal to 5 days (06/24/25 0057)  Weight Loss (Malnutrition): other (see comments) (Unable to assess) (06/24/25 0057)                                         Fluid Accumulation (Malnutrition): other (see comments) (Not present) (06/24/25 0057)        A minimum of two characteristics is recommended for diagnosis of either severe or non-severe malnutrition.    Chart Review    Malnutrition Screening Tool Results   Have you recently lost weight without trying?: Yes: Unsure how much  Have you been eating poorly because of a decreased appetite?:  (MELBA)         Home Nutrition Screen Results   Home Tube Feeding: No   Home Parenteral Nutrition: No     Diagnosis:  Syncope   Hepatic encephalopathy   Likely polypharmacy  Thrombocytopenia due to cirrhosis  H/o GI bleed and esophageal varicees s/p banding 2018  Alcoholic Cirrhosis  Hypomagnesemia  Anemia  HTN    History reviewed. No pertinent past medical  history.  History reviewed. No pertinent surgical history.    Scheduled Medications:  cholecalciferol (vitamin D3), 50,000 Units, Weekly  famotidine (PF), 20 mg, Q12H  lactulose, 20 g, TID  losartan, 50 mg, Daily  magnesium oxide, 400 mg, BID  potassium chloride, 40 mEq, Daily  propranoloL, 20 mg, BID  thiamine, 100 mg, Q8H    Continuous Infusions:   PRN Medications:   Current Facility-Administered Medications:     cloNIDine, 0.2 mg, Oral, Q6H PRN    dextrose 50%, 12.5 g, Intravenous, PRN    dextrose 50%, 25 g, Intravenous, PRN    glucagon (human recombinant), 1 mg, Intramuscular, PRN    glucose, 16 g, Oral, PRN    glucose, 24 g, Oral, PRN    haloperidol lactate, 5 mg, Intramuscular, Q6H PRN    hydrALAZINE, 10 mg, Intravenous, Q6H PRN    ibuprofen, 600 mg, Oral, Q6H PRN    labetaloL, 20 mg, Intravenous, Q4H PRN    lorazepam, 2 mg, Intravenous, Q2H PRN    naloxone, 0.02 mg, Intravenous, PRN    ondansetron, 4 mg, Intravenous, Q8H PRN    prochlorperazine, 5 mg, Intravenous, Q6H PRN    sodium chloride 0.9%, 10 mL, Intravenous, PRN    sodium chloride 0.9%, 10 mL, Intravenous, Q12H PRN    ziprasidone, 20 mg, Intramuscular, Q12H PRN    Calorie Containing IV Medications: no significant kcals from medications at this time    Nutrition-Related Labs:   Recent Labs   Lab 06/17/25  0406 06/18/25  0348 06/19/25  0440 06/21/25  0459 06/22/25  0423 06/23/25  0437    139 139 136 138 137   K 3.5 3.2* 3.7 3.8 4.0 4.1   CALCIUM 8.7 8.6 8.7 8.6 8.6 8.6   PHOS 3.1 3.5 4.2  --   --   --    MG 1.70* 1.70* 1.60* 1.60* 1.60* 1.70*   CO2 19* 19* 19* 17* 20* 19*   BUN 11 11 9 10 11 14   CREATININE 0.88 0.94 1.05 0.97 0.91 0.92   EGFRNORACEVR >90 >90 81 89 >90 >90   BILITOT 5.6* 6.4* 3.5* 4.0* 4.3* 4.5*   ALKPHOS 142 84 78 88 82 74   ALT 32 26 20 25 27 24   AST 65* 55 47 61* 59 55   ALBUMIN 2.9* 2.7* 2.2* 2.5* 2.7* 2.4*   AMMONIA 56.0*  --  32.0  --  44.0* 74.0*   WBC 3.70* 4.05 2.29* 3.55* 3.83* 2.73*   HGB 9.0* 8.3* 7.5* 8.1* 8.7* 8.6*    HCT 27.1* 25.2* 23.0* 24.7* 26.4* 25.2*     CrCl:    Lab Value: None    Date: 06/23    Nutrition Orders:  Diet Adult Regular Standard Tray      Appetite/Oral Intake: poor/0-25% of meals  Factors Affecting Nutritional Intake: decreased appetite  Social Needs Impacting Access to Food: unable to assess at this time; will attempt on follow-up  Food Insecurity: No Food Insecurity (6/17/2025)    Hunger Vital Sign     Worried About Running Out of Food in the Last Year: Never true     Ran Out of Food in the Last Year: Never true     Food/Jew/Cultural Preferences:   /    Food Allergies: Review of patient's allergies indicates:  No Known Allergies         Wound(s):       Overview/Hospital Course:    (06/23) RD ASSESSED D/T LOS, 60 Y.O MALE, REGULAR DIET, POOR APT, CONSUMED 0-50% X4 MEALS (AVG 21%), REQ ASSIST W/ MEALS PER FXN SCREEN, NUTR SCORE: 3, BRDN SCORE: 20, ABD S/NT/ND/+BS, LBM TODAY, I/O: 1560/595, PER EMR    Anthropometrics         Last Weight: 77.5 kg (170 lb 13.7 oz) (06/23/25 0445) Weight Method: Bed Scale     BMI Classification: not applicable      No height listed                                   Usual Weight Provided By: EMR weight history    Wt Readings from Last 5 Encounters:   06/23/25 77.5 kg (170 lb 13.7 oz)     Weight Change(s) Since Admission:  Admit Weight: 81.2 kg (179 lb) (06/16/25 1312)      Estimated Needs    Weight Used For Calorie Calculations: 78 kg (171 lb 15.3 oz)  Energy Calorie Requirements (kcal): 1950 - 2340 kcal (25 - 30 kcal/kg cbw)  Energy Need Method: Kcal/kg  Weight Used For Protein Calculations: 78 kg (171 lb 15.3 oz)  Protein Requirements: 62 - 78 gm pro (0.8 - 1.0 gm pro/kg cbw)  Fluid Requirements (mL): 1950 - 2340 mL (1 mL/kcal)        Enteral Nutrition    Patient not receiving enteral nutrition at this time.    Parenteral Nutrition    Patient not receiving parenteral nutrition support at this time.    Evaluation of Received Nutrient Intake    Calories: not meeting  estimated needs  Protein: not meeting estimated needs    Patient Education    Not applicable.         Nutrition Diagnosis     PES: Inadequate protein energy intake related to Acute illness as evidenced by Intake <50% estimated needs.  New      Nutrition Interventions     Intervention(s): general/healthful diet, commercial beverage, and multivitamin/mineral supplement therapy    Goal: Meet Greater than 75% of nutritional needs by discharge. (new)    Goal: Maintain weight throughout hospitalization. (new)    Nutrition Goals & Monitoring     Dietitian will monitor: food and beverage intake, weight, and weight change  Discharge planning:    too early to determine; pending clinical course  Next Date to be Seen by RD: 07/29/25  Please consult if re-assessment needed sooner.

## 2025-06-24 NOTE — PT/OT/SLP EVAL
Physical Therapy Evaluation    Patient Name:  Yong Townsend   MRN:  [id number]    Recommendations:     Discharge Recommendations: Low Intensity Therapy   Discharge Equipment Recommendations: none   Barriers to discharge: current medical status    Assessment:     Yong Townsend is a 60 y.o. male admitted with a medical diagnosis of Alcoholic encephalopathy.  He presents with the following impairments/functional limitations: weakness, impaired endurance, impaired functional mobility, gait instability, impaired balance, impaired cognition, decreased safety awareness, decreased lower extremity function     Patient found with HOB elevated. Patient agreeable to therapy and sitting up in chair. Patient tolerated supine to sit with mod A and then sit to stand with min/mod A with HHA due to unsteadiness. Patient very shaky while walking and required HHA and min A to steady during gait training x 20 feet around room to recliner. Patient turned to sit in recliner, but started to sit too early, and therapist had to assist him to safely sit in chair. Patient does not fully understanding his deficits. Noted slight foot drop on LLE while performing ankle pumps. Patient left up in chair with nurse present..    Rehab Prognosis: Good; patient would benefit from acute skilled PT services to address these deficits and reach maximum level of function.    Recent Surgery: * No surgery found *      Plan:     During this hospitalization, patient to be seen 5 x/week (5-6x weekly/1-2x daily) to address the identified rehab impairments via gait training, therapeutic activities, therapeutic exercises and progress toward the following goals:    Plan of Care Expires:  07/24/25    Subjective     Chief Complaint: weakness  Patient/Family Comments/goals: to go home  Pain/Comfort:       Patients cultural, spiritual, Cheondoism conflicts given the current situation:      Living Environment:  Lives at home with parents  Prior to admission, patients  level of function was independent.  Equipment used at home: none.  DME owned (not currently used): none.  Upon discharge, patient will have assistance from family.    Objective:     Communicated with nursing prior to session.  Patient found HOB elevated with bed alarm, telemetry, blood pressure cuff, pulse ox (continuous), peripheral IV  upon PT entry to room.    General Precautions: Standard, fall  Orthopedic Precautions:N/A   Braces: N/A  Respiratory Status: Room air    Exams:  RLE Strength: WFL  LLE Strength: WFL except L ankle DF at 3-/5    Functional Mobility:  Bed Mobility:     Supine to Sit: moderate assistance  Transfers:     Sit to Stand:  minimum assistance and moderate assistance with hand-held assist  Gait: 20 feet with HHA with min A      AM-PAC 6 CLICK MOBILITY  Total Score:        Treatment & Education:  See above    Patient left HOB elevated with all lines intact, call button in reach, chair alarm on, and nurse notified.    GOALS:   Multidisciplinary Problems       Physical Therapy Goals          Problem: Physical Therapy    Goal Priority Disciplines Outcome Interventions   Physical Therapy Goal     PT, PT/OT Progressing    Description: Goals to be met by: discharge     Patient will increase functional independence with mobility by performin. Supine to sit with Stand-by Assistance  2. Sit to stand transfer with Stand-by Assistance  3. Gait  x 75 feet with Stand-by Assistance using No Assistive Device.                          DME Justifications:  No DME recommended requiring DME justifications    History:     History reviewed. No pertinent past medical history.    History reviewed. No pertinent surgical history.    Time Tracking:     PT Received On: 25  PT Start Time: 0945     PT Stop Time: 1005  PT Total Time (min): 20 min     Billable Minutes: Evaluation 2025

## 2025-06-25 LAB
ALBUMIN SERPL-MCNC: 2.3 G/DL (ref 3.4–5)
ALBUMIN/GLOB SERPL: 0.6 RATIO
ALP SERPL-CCNC: 103 UNIT/L (ref 50–144)
ALT SERPL-CCNC: 26 UNIT/L (ref 1–45)
ANION GAP SERPL CALC-SCNC: 2 MEQ/L (ref 2–13)
AST SERPL-CCNC: 61 UNIT/L (ref 17–59)
BASOPHILS # BLD AUTO: 0.03 X10(3)/MCL (ref 0.01–0.08)
BASOPHILS NFR BLD AUTO: 0.5 % (ref 0.1–1.2)
BILIRUB SERPL-MCNC: 2.7 MG/DL (ref 0–1)
BUN SERPL-MCNC: 12 MG/DL (ref 7–20)
CALCIUM SERPL-MCNC: 8.9 MG/DL (ref 8.4–10.2)
CHLORIDE SERPL-SCNC: 112 MMOL/L (ref 98–110)
CO2 SERPL-SCNC: 21 MMOL/L (ref 21–32)
CREAT SERPL-MCNC: 0.87 MG/DL (ref 0.66–1.25)
CREAT/UREA NIT SERPL: 14 (ref 12–20)
EOSINOPHIL # BLD AUTO: 0.2 X10(3)/MCL (ref 0.04–0.54)
EOSINOPHIL NFR BLD AUTO: 3.6 % (ref 0.7–7)
ERYTHROCYTE [DISTWIDTH] IN BLOOD BY AUTOMATED COUNT: 17.4 %
GFR SERPLBLD CREATININE-BSD FMLA CKD-EPI: >90 ML/MIN/1.73/M2
GLOBULIN SER-MCNC: 3.9 GM/DL (ref 2–3.9)
GLUCOSE SERPL-MCNC: 89 MG/DL (ref 70–115)
HCT VFR BLD AUTO: 23.9 % (ref 36–52)
HGB BLD-MCNC: 7.9 G/DL (ref 13–18)
IMM GRANULOCYTES # BLD AUTO: 0.01 X10(3)/MCL (ref 0–0.03)
IMM GRANULOCYTES NFR BLD AUTO: 0.2 % (ref 0–0.5)
LYMPHOCYTES # BLD AUTO: 1.33 X10(3)/MCL (ref 1.32–3.57)
LYMPHOCYTES NFR BLD AUTO: 24.2 % (ref 20–55)
MAGNESIUM SERPL-MCNC: 1.5 MG/DL (ref 1.8–2.4)
MCH RBC QN AUTO: 30.7 PG (ref 27–34)
MCHC RBC AUTO-ENTMCNC: 33.1 G/DL (ref 31–37)
MCV RBC AUTO: 93 FL (ref 79–99)
MONOCYTES # BLD AUTO: 1.27 X10(3)/MCL (ref 0.3–0.82)
MONOCYTES NFR BLD AUTO: 23.1 % (ref 4.7–12.5)
NEUTROPHILS # BLD AUTO: 2.65 X10(3)/MCL (ref 1.78–5.38)
NEUTROPHILS NFR BLD AUTO: 48.4 % (ref 37–73)
PLATELET # BLD AUTO: 46 X10(3)/MCL (ref 140–371)
PLATELET # BLD EST: ABNORMAL 10*3/UL
PMV BLD AUTO: ABNORMAL FL
POTASSIUM SERPL-SCNC: 3.8 MMOL/L (ref 3.5–5.1)
PROT SERPL-MCNC: 6.2 GM/DL (ref 6.3–8.2)
RBC # BLD AUTO: 2.57 X10(6)/MCL (ref 4–6)
SODIUM SERPL-SCNC: 135 MMOL/L (ref 136–145)
WBC # BLD AUTO: 5.49 X10(3)/MCL (ref 4–11.5)

## 2025-06-25 PROCEDURE — 97116 GAIT TRAINING THERAPY: CPT

## 2025-06-25 PROCEDURE — 25000003 PHARM REV CODE 250: Performed by: FAMILY MEDICINE

## 2025-06-25 PROCEDURE — 63600175 PHARM REV CODE 636 W HCPCS

## 2025-06-25 PROCEDURE — 94761 N-INVAS EAR/PLS OXIMETRY MLT: CPT

## 2025-06-25 PROCEDURE — 11000001 HC ACUTE MED/SURG PRIVATE ROOM

## 2025-06-25 PROCEDURE — 83735 ASSAY OF MAGNESIUM: CPT | Performed by: FAMILY MEDICINE

## 2025-06-25 PROCEDURE — 63600175 PHARM REV CODE 636 W HCPCS: Performed by: FAMILY MEDICINE

## 2025-06-25 PROCEDURE — 25000003 PHARM REV CODE 250

## 2025-06-25 PROCEDURE — 63600175 PHARM REV CODE 636 W HCPCS: Performed by: INTERNAL MEDICINE

## 2025-06-25 PROCEDURE — 85025 COMPLETE CBC W/AUTO DIFF WBC: CPT | Performed by: FAMILY MEDICINE

## 2025-06-25 PROCEDURE — 80053 COMPREHEN METABOLIC PANEL: CPT | Performed by: FAMILY MEDICINE

## 2025-06-25 RX ORDER — BACLOFEN 5 MG/1
5 TABLET ORAL 3 TIMES DAILY PRN
Status: DISCONTINUED | OUTPATIENT
Start: 2025-06-25 | End: 2025-07-03 | Stop reason: HOSPADM

## 2025-06-25 RX ORDER — FAMOTIDINE 20 MG/1
20 TABLET, FILM COATED ORAL 2 TIMES DAILY
Status: DISCONTINUED | OUTPATIENT
Start: 2025-06-25 | End: 2025-07-03 | Stop reason: HOSPADM

## 2025-06-25 RX ADMIN — THIAMINE HCL TAB 100 MG 100 MG: 100 TAB at 08:06

## 2025-06-25 RX ADMIN — PROCHLORPERAZINE EDISYLATE 5 MG: 5 INJECTION INTRAMUSCULAR; INTRAVENOUS at 01:06

## 2025-06-25 RX ADMIN — LACTULOSE 20 G: 20 SOLUTION ORAL at 08:06

## 2025-06-25 RX ADMIN — THIAMINE HCL TAB 100 MG 100 MG: 100 TAB at 01:06

## 2025-06-25 RX ADMIN — BACLOFEN 5 MG: 5 TABLET ORAL at 04:06

## 2025-06-25 RX ADMIN — Medication 50000 UNITS: at 08:06

## 2025-06-25 RX ADMIN — PROPRANOLOL HYDROCHLORIDE 20 MG: 20 TABLET ORAL at 08:06

## 2025-06-25 RX ADMIN — HALOPERIDOL LACTATE 5 MG: 5 INJECTION, SOLUTION INTRAMUSCULAR at 12:06

## 2025-06-25 RX ADMIN — POTASSIUM CHLORIDE 40 MEQ: 1500 TABLET, EXTENDED RELEASE ORAL at 08:06

## 2025-06-25 RX ADMIN — THIAMINE HCL TAB 100 MG 100 MG: 100 TAB at 06:06

## 2025-06-25 RX ADMIN — Medication 400 MG: at 08:06

## 2025-06-25 RX ADMIN — LOSARTAN POTASSIUM 50 MG: 50 TABLET, FILM COATED ORAL at 08:06

## 2025-06-25 RX ADMIN — FAMOTIDINE 20 MG: 20 TABLET, FILM COATED ORAL at 08:06

## 2025-06-25 NOTE — NURSING
Pt very pleasant, remains confused, eating a popsicle with no swallowing deficits. Able to be easily redirected.

## 2025-06-25 NOTE — NURSING
Pt sitting up in chair, calm and cooperative, some disorientation noted particularly to place. Pt was reoriented to place. Pt mother, Niyah, at bedside. Safety measures in place with chair alarm.

## 2025-06-25 NOTE — PROGRESS NOTES
Pharmacist Intervention IV to PO Note    Yong Townsend is a 60 y.o. male being treated with IV medication famotidine    Patient Data:    Vital Signs (Most Recent):  Temp: 98.2 °F (36.8 °C) (06/25/25 0701)  Pulse: 85 (Simultaneous filing. User may not have seen previous data.) (06/25/25 0701)  Resp: 16 (06/25/25 0701)  BP: 122/72 (06/25/25 0701)  SpO2: 100 % (06/25/25 0701) Vital Signs (72h Range):  Temp:  [97.7 °F (36.5 °C)-102.1 °F (38.9 °C)]   Pulse:  []   Resp:  [10-33]   BP: (108-171)/()   SpO2:  [86 %-100 %]      CBC:  Recent Labs   Lab 06/22/25  0423 06/23/25  0437 06/25/25  0457   WBC 3.83* 2.73* 5.49   RBC 2.82* 2.79* 2.57*   HGB 8.7* 8.6* 7.9*   HCT 26.4* 25.2* 23.9*   PLT 45* 36* 46*   MCV 93.6 90.3 93.0   MCH 30.9 30.8 30.7   MCHC 33.0 34.1 33.1     CMP:     Recent Labs   Lab 06/22/25  0423 06/23/25  0437 06/25/25  0457   GLU 93 87 89   CALCIUM 8.6 8.6 8.9   ALBUMIN 2.7* 2.4* 2.3*   PROT 6.8 6.4 6.2*    137 135*   K 4.0 4.1 3.8   CO2 20* 19* 21   * 116* 112*   BUN 11 14 12   CREATININE 0.91 0.92 0.87   ALKPHOS 82 74 103   ALT 27 24 26   AST 59 55 61*   BILITOT 4.3* 4.5* 2.7*       Dietary Orders:  Diet Orders            Dietary nutrition supplements BID; Boost Plus Nutritional Drink - Very Vanilla starting at 06/24 0058    Diet Adult Regular Standard Tray: Regular starting at 06/18 0941            Based on the following criteria, this patient qualifies for intravenous to oral conversion:  [x] The patients gastrointestinal tract is functioning (tolerating medications via oral or enteral route for 24 hours and tolerating food or enteral feeds for 24 hours).  [x] The patient is hemodynamically stable for 24 hours (heart rate <100 beats per minute, systolic blood pressure >99 mm Hg, and respiratory rate <20 breaths per minute).  [x] The patient shows clinical improvement (afebrile for at least 24 hours and white blood cell count downtrending or normalized). Additionally, the  patient must be non-neutropenic (absolute neutrophil count >500 cells/mm3).  [x] For antimicrobials, the patient has received IV therapy for at least 24 hours.    IV medication famotidine IV will be changed to oral medication famotidine PO    Pharmacist's Name: Ping Gan  Pharmacist's Extension: [phone]

## 2025-06-25 NOTE — PLAN OF CARE
06/25/25 1336   Discharge Reassessment   Assessment Type Discharge Planning Reassessment   Did the patient's condition or plan change since previous assessment? Yes   Discharge Plan discussed with: Patient;Parent(s)   Name(s) and Number(s) Yong Townsedn-Father   Communicated ARIANE with patient/caregiver Yes   Discharge Plan A Rehab   Discharge Plan B Home with family;Home Health   DME Needed Upon Discharge  none   Transition of Care Barriers Does not adhere to care plan;Mobility;Substance Abuse   Why the patient remains in the hospital Requires continued medical care   Post-Acute Status   Discharge Delays None known at this time

## 2025-06-25 NOTE — NURSING
Eylea injection recommended today after discussion of benefits, risks, alternatives. The injection was administered without complication. Post-injection instructions were reviewed and understood by the patient. Pt transferred per wheelchair in stable conditon to [facility] on [address].

## 2025-06-25 NOTE — PROGRESS NOTES
Hospital Medicine  Progress Note    Patient Name: Yong Townsend  MRN: [id number]  Status: IP- Inpatient   Admission Date: 6/16/2025  Length of Stay: 9  Date of Service: 06/25/2025       CC: hospital follow-up for ams       SUBJECTIVE     CC: hospital follow-up for ams         SUBJECTIVE   60 years old male patient with unknown PMH but taking Trazodone, Baclofen, and Tizanidine was brought to the emergency room to be evaluated for unresponsiveness. Patient is currently pleasantly confused, spontaneously moving extremities in the bed but not following command verbally. Entire history is obtained from emergency and nursing report. I did try to reach to patients mother but unable to reach. As per the report patient was at [facility] this morning and went for the washroom around 9. His mother found him unresponsive in the washroom around 10:30 but for some reason she did not call 911 until 1:30 PM. She thought that patient will be responsive, but he did not so 911 was eventually called. On arrival CT scan of the head, CTA of the head and neck and MRI of the brain were obtained. Neurologist was also consulted. MRI of the brain is negative for any acute infarct, CT head is also negative for any acute findings as well as CT of the head and neck. Initial workup showed ammonia level of 166, hemoglobin 9.3, platelet count 75, sodium 141, potassium 3.9, and dimer of 0.85. Hie did get CTA head and neck so CTA chest was not obtained. On arrival patient's GCS level was only 8 and was only responsive to painful stimuli. During the course of the emergency room he has become more awake and GCS score improved. Before MRI patient was agitated so he required IV Versed and Lorazepam. Medicine team is asked to hospitalize the patient for further care. No fever or diarrhea or reported chest pain or SOB or tingling or numbness or slurred speech or focal weakness prior to unresponsiveness.      06/17/2025 pt admitted with hepatic  encephalopathy, h/o significant ETOH use, drinks beer and whisky according to his mom.  Work up in ER essentially negative.    Pt apparently has 2 epic charts with different MRN, [id number] and this chart [id number] in the original chart he has multiple admissions to [facility] in [place] for alcoholic cirrhosis, h/o esophageal banding and varicees back to 2018, ETOH use, Cocaine +, Marijuana + , Has reportedly seen Dr. Mon in the past, saw DR. Rodriguez in 2022 for varicees after being transferred to [facility] from [facility].  Pt with h/o Thrombocytopenia last 5/2025 was 23, received platelts in [place] at that time.  Mild Curly and anemia requiring transfusions in the past.  His ammonia was 150+ on admit here.  06/18/2025 pt more awake this am, improving mental staus, more sleepy this am  06/19/2025 pt with increased aggitation started mid morning yesterday back on iv ativan needed some precedex overnight due to combative behaviors, currently resting off precedex received iv ativan, on high dose thiamine and MVI iv bannana bags  06/20/2025 pt still confused ativan giving 2mg iv q 2 hrs, pt not taking po ativan  3/21/2025 started taking po meds today with breakfast  Will try adding po taper today   3/22/2025 more awake today but confused  Tolerating po intake   Held ativan this AM   6/23/2025 more responsive today  But confused ammonia level back up today to 74  Will increase lactulose   6/24/2025 ammonia down to 30  Remains pretty weak  But mental status much improved   6/25/2025  Feels better today  Did well with PT     Today: Patient seen and examined at bedside, and chart reviewed.       MEDICATIONS   Scheduled   cholecalciferol (vitamin D3)  50,000 Units Oral Weekly    famotidine  20 mg Oral BID    lactulose  20 g Oral TID    losartan  50 mg Oral Daily    magnesium oxide  400 mg Oral BID    potassium chloride  40 mEq Oral Daily    propranoloL  20 mg Oral BID    thiamine  100 mg Oral Q8H     Continuous  Infusions        PHYSICAL EXAM   VITALS: T 98.2 °F (36.8 °C)   /72   P 85   RR 18   O2 99 %    GENERAL: Awake and in NAD  LUNGS: CTA B/L  CVS: Normal rate  GI/: Soft, NT, bowel sounds positive.  EXTREMITIES: No peripheral edema  NEURO: AAOx3  PSYCH: Cooperative      LABS   CBC  Recent Labs     06/23/25 0437 06/25/25 0457   WBC 2.73* 5.49   RBC 2.79* 2.57*   HGB 8.6* 7.9*   HCT 25.2* 23.9*   MCV 90.3 93.0   MCH 30.8 30.7   MCHC 34.1 33.1   RDW 17.2 17.4   PLT 36* 46*     CHEM  Recent Labs     06/23/25 0437 06/25/25 0457    135*   K 4.1 3.8   CO2 19* 21   BUN 14 12   CREATININE 0.92 0.87   CALCIUM 8.6 8.9   MG 1.70* 1.50*   ALBUMIN 2.4* 2.3*   GLOBULIN 4.0* 3.9   ALKPHOS 74 103   ALT 24 26   AST 55 61*   BILITOT 4.5* 2.7*         MICROBIOLOGY     Microbiology Results (last 7 days)       Procedure Component Value Units Date/Time    Blood Culture [[id number]] Collected: 06/22/25 2150    Order Status: Completed Specimen: Blood from Arm Updated: 06/24/25 2301     Blood Culture No Growth At 48 Hours    Blood Culture [[id number]] Collected: 06/22/25 2150    Order Status: Completed Specimen: Blood from Arm Updated: 06/24/25 2301     Blood Culture No Growth At 48 Hours              DIAGNOSTICS   US Abdomen Limited  Narrative: EXAMINATION:  STUDY: US ABDOMEN LIMITED    CLINICAL HISTORY AND TECHNIQUE:  Hepatic encephalopathy    COMPARISON:  None    FINDINGS:  The quality of this examination is very limited due to lack of patient cooperation and difficulty in proper positioning and control of respiration.    Liver:   The liver appears mildly atrophic with a slightly heterogeneous echogenic appearance but no worrisome focal parenchymal abnormalities are dilated biliary radicles.  There is adequate antegrade flow within the portal vein with no evidence of portal venous shunting.    Gallbladder/biliary system: The gallbladder is adequately distended with no evidence of cholelithiasis, thickening of the gallbladder  "wall or pericholecystic edema. The patient was not significantly tender while scanning over the gallbladder. The common bile duct, where visualized, is not dilated nor do I see evidence of intraductal stones.    Miscellaneous: N/a  Impression: 1. Less than optimal examination due to lack of patient cooperation, difficulty and proper positioning and control of respiration.  2. The liver appears mildly atrophic with a slightly heterogeneous echogenic appearance.  These findings are nonspecific but can be seen with cirrhosis and clinical correlation is recommended.    Electronically signed by: Nasir Aden  Date:    06/17/2025  Time:    07:32    Assessment and Plan      Syncope      Hepatic encephalopathy   Ammonia level up to 73 today  Increase lactulose to TID  Repeat am level   Pt with aggressive and self injurious behaviors, still requiring ativan and restraints  Try adding po taper     Likely polypharmacy due to Baclofen,Tizanidine and Trazodone as well these meds are being held     Thrombocytopenia due to cirrhosis  The likely etiology of thrombocytopenia is liver disease. The patients 3 most recent labs are listed below.          Recent Labs     06/18/25  0348 06/19/25  0440   PLT 46* 36*      Plan  - Will transfuse if platelet count is <50k (if undergoing surgical procedure or have active bleeding).  -        H/o GI bleed and esophageal varicees s/p banding 2018     Alcoholic Cirrhosis continue ativan iv, precedex if needed  No seizures or h/o seizures per family  Continue thimine and MVI  Po ativan taper     Hypomagnesemia  Patient has Abnormal Magnesium: hypomagnesemia. Will continue to monitor electrolytes closely. Will replace the affected electrolytes and repeat labs to be done after interventions completed. The patient's magnesium results have been reviewed and are listed below.  No results for input(s): "MG" in the last 24 hours.  Give another 2gm mag this am     Anemia  Anemia is likely due to chronic " blood loss and Iron deficiency. Most recent hemoglobin and hematocrit are listed below.          Recent Labs     06/18/25  0348 06/19/25  0440   HGB 8.3* 7.5*   HCT 25.2* 23.0*      Plan  - Monitor serial CBC: Daily  - Transfuse PRBC if patient becomes hemodynamically unstable, symptomatic or H/H drops below 7/21.  - Patient has not received any PRBC transfusions to date  - Patient's anemia is currently stable  -  check irone and b12, folate levels  Known h/o esophageal varicees  Occult +        HTN  Patients blood pressure range in the last 24 hours was: BP  Min: 68/46  Max: 200/115.The patient's inpatient anti-hypertensive regimen is listed below:  Current Antihypertensives  , 2 times daily, Oral  propranoloL tablet 20 mg, 2 times daily, Oral  labetalol 20 mg/4 mL (5 mg/mL) IV syring, Every 4 hours PRN, Intravenous  hydrALAZINE injection 10 mg, Every 6 hours PRN, Intravenous  cloNIDine tablet 0.2 mg, Every 6 hours PRN, Oral  losartan tablet 50 mg, Daily, Oral     Plan  - BP is uncontrolled, will adjust as follows: add losartan 50mg daily           Fall precautions   Seizure precautions     Decrease ativan to 0.5mg q 6 hrs  Continue Lactulose po TID  Rechek ammonia level in am   Cont PT  Case mgmt- rehab consult   Will keep NPO until patient becomes fully alert.   Gentle IV fluid   UDS neg     Cont PT         Pawel Dove MD  [facility] Medicine

## 2025-06-25 NOTE — PT/OT/SLP PROGRESS
"Physical Therapy Treatment    Patient Name:  Yong Townsend   MRN:  [id number]    Recommendations:     Discharge Recommendations: Low Intensity Therapy  Discharge Equipment Recommendations: none  Barriers to discharge: current medical status    Assessment:     Yong Townsend is a 60 y.o. male admitted with a medical diagnosis of Alcoholic encephalopathy.  He presents with the following impairments/functional limitations: weakness, impaired endurance, impaired functional mobility, gait instability, impaired balance, impaired cognition, decreased safety awareness, decreased lower extremity function     Nurse entered room as patient was trying to get out of bed. Patient states "I need to go outside and wait on my mom". Patient had to be reoriented to room. Patient clear at times, and slightly confused at times. Patient agreeable to walk and sit up in chair. Patient required HHA due to unsteadiness while walking to chair x 20 feet. Noted foot drop on LLE causes patient to drag L foot. Patient states its due to his "severe sciatica". Patient sat safely in chair and left up with chair alarm on.    Rehab Prognosis: Good; patient would benefit from acute skilled PT services to address these deficits and reach maximum level of function.    Recent Surgery: * No surgery found *      Plan:     During this hospitalization, patient to be seen 5 x/week (5-6x weekly/1-2x daily) to address the identified rehab impairments via gait training, therapeutic activities, therapeutic exercises and progress toward the following goals:    Plan of Care Expires:  07/24/25    Subjective     Chief Complaint: none verbalized  Patient/Family Comments/goals: to go home  Pain/Comfort:         Objective:     Communicated with nursing prior to session.  Patient found HOB elevated with bed alarm, telemetry, blood pressure cuff, pulse ox (continuous), peripheral IV upon PT entry to room.     General Precautions: Standard, fall  Orthopedic Precautions: " N/A  Braces: N/A  Respiratory Status: Room air     Functional Mobility:  Bed Mobility:     Supine to Sit: supervision  Transfers:     Sit to Stand:  minimum assistance with hand-held assist  Gait: 20 feet to chair with HHA and min/mod A   Balance: required min/mod A to walk today      AM-PAC 6 CLICK MOBILITY          Treatment & Education:  See above    Patient left up in chair with all lines intact, call button in reach, chair alarm on, and nurse notified..    GOALS:   Multidisciplinary Problems       Physical Therapy Goals          Problem: Physical Therapy    Goal Priority Disciplines Outcome Interventions   Physical Therapy Goal     PT, PT/OT Progressing    Description: Goals to be met by: discharge     Patient will increase functional independence with mobility by performin. Supine to sit with Stand-by Assistance  2. Sit to stand transfer with Stand-by Assistance  3. Gait  x 75 feet with Stand-by Assistance using No Assistive Device.                          DME Justifications:  No DME recommended requiring DME justifications    Time Tracking:     PT Received On: 25  PT Start Time: 858     PT Stop Time: 09  PT Total Time (min): 10 min     Billable Minutes: Gait Training 10    Treatment Type: Treatment  PT/PTA: PT           2025

## 2025-06-25 NOTE — PLAN OF CARE
Problem: Adult Inpatient Plan of Care  Goal: Plan of Care Review  Outcome: Progressing  Flowsheets (Taken 6/25/2025 9769)  Plan of Care Reviewed With: patient  Goal: Patient-Specific Goal (Individualized)  Outcome: Progressing  Goal: Absence of Hospital-Acquired Illness or Injury  Outcome: Progressing  Goal: Optimal Comfort and Wellbeing  Outcome: Progressing  Goal: Readiness for Transition of Care  Outcome: Progressing     Problem: Delirium  Goal: Optimal Coping  Outcome: Progressing  Goal: Improved Behavioral Control  Outcome: Progressing  Goal: Improved Attention and Thought Clarity  Outcome: Progressing  Goal: Improved Sleep  Outcome: Progressing     Problem: Fall Injury Risk  Goal: Absence of Fall and Fall-Related Injury  Outcome: Progressing     Problem: Infection  Goal: Absence of Infection Signs and Symptoms  Outcome: Progressing     Problem: Skin Injury Risk Increased  Goal: Skin Health and Integrity  Outcome: Progressing

## 2025-06-26 LAB
ABS NEUT CALC (OHS): 4.98 X10(3)/MCL (ref 2.1–9.2)
ALBUMIN SERPL-MCNC: 2.7 G/DL (ref 3.4–5)
ALBUMIN/GLOB SERPL: 0.7 RATIO
ALP SERPL-CCNC: 111 UNIT/L (ref 50–144)
ALT SERPL-CCNC: 33 UNIT/L (ref 1–45)
AMMONIA PLAS-MSCNC: 89 UMOL/L (ref 11–32)
ANION GAP SERPL CALC-SCNC: 3 MEQ/L (ref 2–13)
ANISOCYTOSIS BLD QL SMEAR: ABNORMAL
AST SERPL-CCNC: 78 UNIT/L (ref 17–59)
BILIRUB SERPL-MCNC: 2.7 MG/DL (ref 0–1)
BUN SERPL-MCNC: 13 MG/DL (ref 7–20)
CALCIUM SERPL-MCNC: 9.3 MG/DL (ref 8.4–10.2)
CHLORIDE SERPL-SCNC: 112 MMOL/L (ref 98–110)
CO2 SERPL-SCNC: 20 MMOL/L (ref 21–32)
CREAT SERPL-MCNC: 0.86 MG/DL (ref 0.66–1.25)
CREAT/UREA NIT SERPL: 15 (ref 12–20)
ERYTHROCYTE [DISTWIDTH] IN BLOOD BY AUTOMATED COUNT: 17.3 %
GFR SERPLBLD CREATININE-BSD FMLA CKD-EPI: >90 ML/MIN/1.73/M2
GLOBULIN SER-MCNC: 4.1 GM/DL (ref 2–3.9)
GLUCOSE SERPL-MCNC: 123 MG/DL (ref 70–115)
HCT VFR BLD AUTO: 24.7 % (ref 36–52)
HGB BLD-MCNC: 8.3 G/DL (ref 13–18)
LYMPH ABN # BLD MANUAL: 2 %
LYMPHOCYTES NFR BLD MANUAL: 1.42 X10(3)/MCL (ref 0.6–4.6)
LYMPHOCYTES NFR BLD MANUAL: 20 % (ref 20–55)
MAGNESIUM SERPL-MCNC: 1.8 MG/DL (ref 1.8–2.4)
MCH RBC QN AUTO: 30.5 PG (ref 27–34)
MCHC RBC AUTO-ENTMCNC: 33.6 G/DL (ref 31–37)
MCV RBC AUTO: 90.8 FL (ref 79–99)
MONOCYTES NFR BLD MANUAL: 0.57 X10(3)/MCL (ref 0.1–1.3)
MONOCYTES NFR BLD MANUAL: 8 % (ref 0–10)
NEUTROPHILS NFR BLD MANUAL: 68 % (ref 37–73)
NEUTS BAND NFR BLD MANUAL: 2 % (ref 0–5)
NRBC BLD AUTO-RTO: 0 %
PLATELET # BLD AUTO: 59 X10(3)/MCL (ref 140–371)
PLATELET # BLD EST: ABNORMAL 10*3/UL
PMV BLD AUTO: 12.4 FL (ref 9.4–12.4)
POIKILOCYTOSIS BLD QL SMEAR: SLIGHT
POTASSIUM SERPL-SCNC: 4.3 MMOL/L (ref 3.5–5.1)
PROT SERPL-MCNC: 6.8 GM/DL (ref 6.3–8.2)
RBC # BLD AUTO: 2.72 X10(6)/MCL (ref 4–6)
SODIUM SERPL-SCNC: 135 MMOL/L (ref 136–145)
TEAR DROP CELL (OLG): SLIGHT
WBC # BLD AUTO: 7.12 X10(3)/MCL (ref 4–11.5)

## 2025-06-26 PROCEDURE — 25000003 PHARM REV CODE 250: Performed by: FAMILY MEDICINE

## 2025-06-26 PROCEDURE — 97530 THERAPEUTIC ACTIVITIES: CPT

## 2025-06-26 PROCEDURE — 80053 COMPREHEN METABOLIC PANEL: CPT | Performed by: FAMILY MEDICINE

## 2025-06-26 PROCEDURE — 85025 COMPLETE CBC W/AUTO DIFF WBC: CPT | Performed by: FAMILY MEDICINE

## 2025-06-26 PROCEDURE — 36415 COLL VENOUS BLD VENIPUNCTURE: CPT | Performed by: FAMILY MEDICINE

## 2025-06-26 PROCEDURE — 11000001 HC ACUTE MED/SURG PRIVATE ROOM

## 2025-06-26 PROCEDURE — 97116 GAIT TRAINING THERAPY: CPT

## 2025-06-26 PROCEDURE — 82140 ASSAY OF AMMONIA: CPT | Performed by: FAMILY MEDICINE

## 2025-06-26 PROCEDURE — 94761 N-INVAS EAR/PLS OXIMETRY MLT: CPT

## 2025-06-26 PROCEDURE — 63600175 PHARM REV CODE 636 W HCPCS: Performed by: FAMILY MEDICINE

## 2025-06-26 PROCEDURE — 83735 ASSAY OF MAGNESIUM: CPT | Performed by: FAMILY MEDICINE

## 2025-06-26 RX ORDER — CHLORPROMAZINE HYDROCHLORIDE 25 MG/1
25 TABLET, FILM COATED ORAL 3 TIMES DAILY PRN
Status: DISCONTINUED | OUTPATIENT
Start: 2025-06-26 | End: 2025-07-03 | Stop reason: HOSPADM

## 2025-06-26 RX ORDER — QUETIAPINE FUMARATE 100 MG/1
100 TABLET, FILM COATED ORAL 2 TIMES DAILY
Status: DISCONTINUED | OUTPATIENT
Start: 2025-06-26 | End: 2025-06-30

## 2025-06-26 RX ADMIN — FAMOTIDINE 20 MG: 20 TABLET, FILM COATED ORAL at 08:06

## 2025-06-26 RX ADMIN — BACLOFEN 5 MG: 5 TABLET ORAL at 01:06

## 2025-06-26 RX ADMIN — QUETIAPINE FUMARATE 100 MG: 100 TABLET ORAL at 12:06

## 2025-06-26 RX ADMIN — HALOPERIDOL LACTATE 5 MG: 5 INJECTION, SOLUTION INTRAMUSCULAR at 09:06

## 2025-06-26 RX ADMIN — THIAMINE HCL TAB 100 MG 100 MG: 100 TAB at 12:06

## 2025-06-26 RX ADMIN — PROPRANOLOL HYDROCHLORIDE 20 MG: 20 TABLET ORAL at 08:06

## 2025-06-26 RX ADMIN — CHLORPROMAZINE HYDROCHLORIDE 25 MG: 25 TABLET, FILM COATED ORAL at 12:06

## 2025-06-26 RX ADMIN — Medication 400 MG: at 08:06

## 2025-06-26 RX ADMIN — POTASSIUM CHLORIDE 40 MEQ: 1500 TABLET, EXTENDED RELEASE ORAL at 08:06

## 2025-06-26 RX ADMIN — LACTULOSE 20 G: 20 SOLUTION ORAL at 12:06

## 2025-06-26 RX ADMIN — CHLORPROMAZINE HYDROCHLORIDE 25 MG: 25 TABLET, FILM COATED ORAL at 08:06

## 2025-06-26 RX ADMIN — BACLOFEN 5 MG: 5 TABLET ORAL at 08:06

## 2025-06-26 RX ADMIN — THIAMINE HCL TAB 100 MG 100 MG: 100 TAB at 05:06

## 2025-06-26 RX ADMIN — LOSARTAN POTASSIUM 50 MG: 50 TABLET, FILM COATED ORAL at 08:06

## 2025-06-26 RX ADMIN — LACTULOSE 20 G: 20 SOLUTION ORAL at 08:06

## 2025-06-26 RX ADMIN — QUETIAPINE FUMARATE 100 MG: 100 TABLET ORAL at 08:06

## 2025-06-26 RX ADMIN — THIAMINE HCL TAB 100 MG 100 MG: 100 TAB at 08:06

## 2025-06-26 RX ADMIN — LACTULOSE 20 G: 20 SOLUTION ORAL at 06:06

## 2025-06-26 NOTE — PLAN OF CARE
Problem: Adult Inpatient Plan of Care  Goal: Plan of Care Review  Outcome: Progressing  Goal: Patient-Specific Goal (Individualized)  Outcome: Progressing  Goal: Absence of Hospital-Acquired Illness or Injury  Outcome: Progressing  Goal: Optimal Comfort and Wellbeing  Outcome: Progressing  Goal: Readiness for Transition of Care  Outcome: Progressing     Problem: Delirium  Goal: Optimal Coping  Outcome: Progressing  Goal: Improved Behavioral Control  Outcome: Progressing  Goal: Improved Attention and Thought Clarity  Outcome: Progressing  Goal: Improved Sleep  Outcome: Progressing     Problem: Fall Injury Risk  Goal: Absence of Fall and Fall-Related Injury  Outcome: Progressing     Problem: Infection  Goal: Absence of Infection Signs and Symptoms  Outcome: Progressing     Problem: Skin Injury Risk Increased  Goal: Skin Health and Integrity  Outcome: Progressing    Still attempts to get oob even with attendant at bedside;easily redirected;still having hiccups at times;had multiple bm's;no further vomiting;meds administered as ordered per md

## 2025-06-26 NOTE — PT/OT/SLP PROGRESS
Physical Therapy Treatment    Patient Name:  Yong Townsend   MRN:  [id number]    Recommendations:     Discharge Recommendations: Low Intensity Therapy  Discharge Equipment Recommendations: none  Barriers to discharge: current medical status    Assessment:     Yong Townsend is a 60 y.o. male admitted with a medical diagnosis of Alcoholic encephalopathy.  He presents with the following impairments/functional limitations: weakness, impaired endurance, impaired functional mobility, gait instability, impaired balance, impaired cognition, decreased safety awareness, decreased lower extremity function     Family advised nurses station that patient needed to go to bathroom. Upon entering, patient was already scooting to EOB. Patient assisted to sit EOB and then to standing. Patient used HHA and min/mod A to perform gait training to restroom. Patient sat on toilet and had large BM. Patient used grab bar and stood, with therapist assisting him with perineal care due to unsteadiness. Patient remains slightly confused at times, but able to be reoriented. Required HHA/min A due to unsteadiness while walking in room. Noted foot drop on LLE causes patient to drag L foot. Patient required max A for safety cues while walking, including for posture and turns. Patient returned to sit EOB but required max cues to sit safely on edge of bed. Patient returned to supine with min A and required mod A to be repositioned for comfort. Patient left with HOB elevated and all needs reach with visitors present.     Rehab Prognosis: Good; patient would benefit from acute skilled PT services to address these deficits and reach maximum level of function.    Recent Surgery: * No surgery found *      Plan:     During this hospitalization, patient to be seen 5 x/week (5-6x weekly/1-2x daily) to address the identified rehab impairments via gait training, therapeutic activities, therapeutic exercises and progress toward the following goals:    Plan of  Care Expires:  25    Subjective     Chief Complaint: none verbalized  Patient/Family Comments/goals: to go home  Pain/Comfort:         Objective:     Communicated with nursing prior to session.  Patient found HOB elevated with bed alarm, telemetry, blood pressure cuff, pulse ox (continuous), peripheral IV upon PT entry to room.     General Precautions: Standard, fall  Orthopedic Precautions: N/A  Braces: N/A  Respiratory Status: Room air     Functional Mobility:  Bed Mobility:     Supine to Sit: contact guard assistance and minimum assistance  Sit to Supine: minimum assistance  Transfers:     Sit to Stand:  minimum assistance with hand-held assist  Gait: 20 feet to restroom, 25 feet in room with HHA and min/mod A   Balance: required min/mod A to walk today      AM-PAC 6 CLICK MOBILITY          Treatment & Education:  See above    Patient left HOB elevated with all lines intact, call button in reach, bed alarm on, nurse notified, and visitors present..    GOALS:   Multidisciplinary Problems       Physical Therapy Goals          Problem: Physical Therapy    Goal Priority Disciplines Outcome Interventions   Physical Therapy Goal     PT, PT/OT Progressing    Description: Goals to be met by: discharge     Patient will increase functional independence with mobility by performin. Supine to sit with Stand-by Assistance  2. Sit to stand transfer with Stand-by Assistance  3. Gait  x 75 feet with Stand-by Assistance using No Assistive Device.                          DME Justifications:  No DME recommended requiring DME justifications    Time Tracking:     PT Received On: 25  PT Start Time: 935     PT Stop Time: 958  PT Total Time (min): 23 min     Billable Minutes: Gait Training 15 and Therapeutic Activity 8    Treatment Type: Treatment  PT/PTA: PT           2025

## 2025-06-26 NOTE — PROGRESS NOTES
Hospital Medicine  Progress Note    Patient Name: Yong Townsend  MRN: [id number]  Status: IP- Inpatient   Admission Date: 6/16/2025  Length of Stay: 10  Date of Service: 06/26/2025       CC: hospital follow-up for ams       SUBJECTIVE   60 years old male patient with unknown PMH but taking Trazodone, Baclofen, and Tizanidine was brought to the emergency room to be evaluated for unresponsiveness. Patient is currently pleasantly confused, spontaneously moving extremities in the bed but not following command verbally. Entire history is obtained from emergency and nursing report. I did try to reach to patients mother but unable to reach. As per the report patient was at [facility] this morning and went for the washroom around 9. His mother found him unresponsive in the washroom around 10:30 but for some reason she did not call 911 until 1:30 PM. She thought that patient will be responsive, but he did not so 911 was eventually called. On arrival CT scan of the head, CTA of the head and neck and MRI of the brain were obtained. Neurologist was also consulted. MRI of the brain is negative for any acute infarct, CT head is also negative for any acute findings as well as CT of the head and neck. Initial workup showed ammonia level of 166, hemoglobin 9.3, platelet count 75, sodium 141, potassium 3.9, and dimer of 0.85. Hie did get CTA head and neck so CTA chest was not obtained. On arrival patient's GCS level was only 8 and was only responsive to painful stimuli. During the course of the emergency room he has become more awake and GCS score improved. Before MRI patient was agitated so he required IV Versed and Lorazepam. Medicine team is asked to hospitalize the patient for further care. No fever or diarrhea or reported chest pain or SOB or tingling or numbness or slurred speech or focal weakness prior to unresponsiveness.      06/17/2025 pt admitted with hepatic encephalopathy, h/o significant ETOH use, drinks beer and whisky  according to his mom.  Work up in ER essentially negative.    Pt apparently has 2 epic charts with different MRN, [id number] and this chart [id number] in the original chart he has multiple admissions to [facility] in [place] for alcoholic cirrhosis, h/o esophageal banding and varicees back to 2018, ETOH use, Cocaine +, Marijuana + , Has reportedly seen Dr. Mon in the past, saw DR. Rodriguez in 2022 for varicees after being transferred to [facility] from [facility].  Pt with h/o Thrombocytopenia last 5/2025 was 23, received platelts in [place] at that time.  Mild Curly and anemia requiring transfusions in the past.  His ammonia was 150+ on admit here.  06/18/2025 pt more awake this am, improving mental staus, more sleepy this am  06/19/2025 pt with increased aggitation started mid morning yesterday back on iv ativan needed some precedex overnight due to combative behaviors, currently resting off precedex received iv ativan, on high dose thiamine and MVI iv bannana bags  06/20/2025 pt still confused ativan giving 2mg iv q 2 hrs, pt not taking po ativan  3/21/2025 started taking po meds today with breakfast  Will try adding po taper today   3/22/2025 more awake today but confused  Tolerating po intake   Held ativan this AM   6/23/2025 more responsive today  But confused ammonia level back up today to 74  Will increase lactulose   6/24/2025 ammonia down to 30  Remains pretty weak  But mental status much improved   6/25/2025  Feels better today  Did well with PT   6/26/2025  Ammonia back up today to 89  Worked with PT but very weak  [facility]     Today: Patient seen and examined at bedside, and chart reviewed.       MEDICATIONS   Scheduled   cholecalciferol (vitamin D3)  50,000 Units Oral Weekly    famotidine  20 mg Oral BID    [START ON 6/27/2025] lactulose  20 g Oral Daily    losartan  50 mg Oral Daily    magnesium oxide  400 mg Oral BID    potassium chloride  40 mEq Oral Daily    propranoloL  20 mg  Oral BID    QUEtiapine  100 mg Oral BID    thiamine  100 mg Oral Q8H     Continuous Infusions        PHYSICAL EXAM   VITALS: T 98.9 °F (37.2 °C)   /62   P 82   RR 20   O2 100 %    GENERAL: Awake and in NAD  LUNGS: CTA B/L  CVS: Normal rate  GI/: Soft, NT, bowel sounds positive.  EXTREMITIES: No peripheral edema  NEURO: AAOx3  PSYCH: Cooperative      LABS   CBC  Recent Labs     06/25/25 0457 06/26/25 0759   WBC 5.49 7.12   RBC 2.57* 2.72*   HGB 7.9* 8.3*   HCT 23.9* 24.7*   MCV 93.0 90.8   MCH 30.7 30.5   MCHC 33.1 33.6   RDW 17.4 17.3   PLT 46* 59*     CHEM  Recent Labs     06/25/25 0457 06/26/25 0759   * 135*   K 3.8 4.3   CO2 21 20*   BUN 12 13   CREATININE 0.87 0.86   CALCIUM 8.9 9.3   MG 1.50* 1.80   ALBUMIN 2.3* 2.7*   GLOBULIN 3.9 4.1*   ALKPHOS 103 111   ALT 26 33   AST 61* 78*   BILITOT 2.7* 2.7*         MICROBIOLOGY     Microbiology Results (last 7 days)       Procedure Component Value Units Date/Time    Blood Culture [[id number]] Collected: 06/22/25 2150    Order Status: Completed Specimen: Blood from Arm Updated: 06/25/25 2300     Blood Culture No Growth At 72 Hours    Blood Culture [[id number]] Collected: 06/22/25 2150    Order Status: Completed Specimen: Blood from Arm Updated: 06/25/25 2300     Blood Culture No Growth At 72 Hours              DIAGNOSTICS   US Abdomen Limited  Narrative: EXAMINATION:  STUDY: US ABDOMEN LIMITED    CLINICAL HISTORY AND TECHNIQUE:  Hepatic encephalopathy    COMPARISON:  None    FINDINGS:  The quality of this examination is very limited due to lack of patient cooperation and difficulty in proper positioning and control of respiration.    Liver:   The liver appears mildly atrophic with a slightly heterogeneous echogenic appearance but no worrisome focal parenchymal abnormalities are dilated biliary radicles.  There is adequate antegrade flow within the portal vein with no evidence of portal venous shunting.    Gallbladder/biliary system: The gallbladder is  "adequately distended with no evidence of cholelithiasis, thickening of the gallbladder wall or pericholecystic edema. The patient was not significantly tender while scanning over the gallbladder. The common bile duct, where visualized, is not dilated nor do I see evidence of intraductal stones.    Miscellaneous: N/a  Impression: 1. Less than optimal examination due to lack of patient cooperation, difficulty and proper positioning and control of respiration.  2. The liver appears mildly atrophic with a slightly heterogeneous echogenic appearance.  These findings are nonspecific but can be seen with cirrhosis and clinical correlation is recommended.    Electronically signed by: Nasir Aden  Date:    06/17/2025  Time:    07:32      Assessment and Plan      Syncope      Hepatic encephalopathy   Ammonia level up to89  Cont lactulose q 6 hr for now   Repeat am level   Pt with aggressive and self injurious behaviors, still requiring ativan and restraints  Try adding po taper     Likely polypharmacy due to Baclofen,Tizanidine and Trazodone as well these meds are being held     Thrombocytopenia due to cirrhosis  The likely etiology of thrombocytopenia is liver disease. The patients 3 most recent labs are listed below.          Recent Labs     06/18/25  0348 06/19/25  0440   PLT 46* 36*      Plan  - Will transfuse if platelet count is <50k (if undergoing surgical procedure or have active bleeding).  -        H/o GI bleed and esophageal varicees s/p banding 2018     Alcoholic Cirrhosis continue ativan iv, precedex if needed  No seizures or h/o seizures per family  Continue thimine and MVI  Po ativan taper     Hypomagnesemia  Patient has Abnormal Magnesium: hypomagnesemia. Will continue to monitor electrolytes closely. Will replace the affected electrolytes and repeat labs to be done after interventions completed. The patient's magnesium results have been reviewed and are listed below.  No results for input(s): "MG" in the last " 24 hours.  Give another 2gm mag this am     Anemia  Anemia is likely due to chronic blood loss and Iron deficiency. Most recent hemoglobin and hematocrit are listed below.          Recent Labs     06/18/25  0348 06/19/25  0440   HGB 8.3* 7.5*   HCT 25.2* 23.0*      Plan  - Monitor serial CBC: Daily  - Transfuse PRBC if patient becomes hemodynamically unstable, symptomatic or H/H drops below 7/21.  - Patient has not received any PRBC transfusions to date  - Patient's anemia is currently stable  -  check irone and b12, folate levels  Known h/o esophageal varicees  Occult +        HTN  Patients blood pressure range in the last 24 hours was: BP  Min: 68/46  Max: 200/115.The patient's inpatient anti-hypertensive regimen is listed below:  Current Antihypertensives  , 2 times daily, Oral  propranoloL tablet 20 mg, 2 times daily, Oral  labetalol 20 mg/4 mL (5 mg/mL) IV syring, Every 4 hours PRN, Intravenous  hydrALAZINE injection 10 mg, Every 6 hours PRN, Intravenous  cloNIDine tablet 0.2 mg, Every 6 hours PRN, Oral  losartan tablet 50 mg, Daily, Oral     Plan  - BP is uncontrolled, will adjust as follows: add losartan 50mg daily           Fall precautions   Seizure precautions     Decrease ativan to 0.5mg q 6 hrs  Continue Lactulose po TID  Rechek ammonia level in am   Cont PT  Case mgmt- rehab consult   Will keep NPO until patient becomes fully alert.   Gentle IV fluid   UDS neg     Cont PT           Pawel Dove MD  [facility]

## 2025-06-26 NOTE — PLAN OF CARE
Problem: Adult Inpatient Plan of Care  Goal: Plan of Care Review  Outcome: Ongoing  Goal: Patient-Specific Goal (Individualized)  Outcome: Ongoing  Goal: Absence of Hospital-Acquired Illness or Injury  Outcome: Ongoing  Goal: Optimal Comfort and Wellbeing  Outcome: Ongoing  Goal: Readiness for Transition of Care  Outcome: Ongoing     Problem: Delirium  Goal: Optimal Coping  Outcome: Ongoing  Goal: Improved Behavioral Control  Outcome: Ongoing  Goal: Improved Attention and Thought Clarity  Outcome: Ongoing  Goal: Improved Sleep  Outcome: Ongoing     Problem: Fall Injury Risk  Goal: Absence of Fall and Fall-Related Injury  Outcome: Ongoing     Problem: Infection  Goal: Absence of Infection Signs and Symptoms  Outcome: Ongoing     Problem: Skin Injury Risk Increased  Goal: Skin Health and Integrity  Outcome: Ongoing

## 2025-06-27 LAB
ALBUMIN SERPL-MCNC: 2.3 G/DL (ref 3.4–5)
ALBUMIN/GLOB SERPL: 0.6 RATIO
ALP SERPL-CCNC: 99 UNIT/L (ref 50–144)
ALT SERPL-CCNC: 29 UNIT/L (ref 1–45)
AMMONIA PLAS-MSCNC: <9 UMOL/L (ref 11–32)
ANION GAP SERPL CALC-SCNC: 1 MEQ/L (ref 2–13)
AST SERPL-CCNC: 65 UNIT/L (ref 17–59)
BILIRUB SERPL-MCNC: 2.2 MG/DL (ref 0–1)
BUN SERPL-MCNC: 14 MG/DL (ref 7–20)
CALCIUM SERPL-MCNC: 9.5 MG/DL (ref 8.4–10.2)
CHLORIDE SERPL-SCNC: 111 MMOL/L (ref 98–110)
CO2 SERPL-SCNC: 21 MMOL/L (ref 21–32)
CREAT SERPL-MCNC: 1.15 MG/DL (ref 0.66–1.25)
CREAT/UREA NIT SERPL: 12 (ref 12–20)
ERYTHROCYTE [DISTWIDTH] IN BLOOD BY AUTOMATED COUNT: 17.1 %
GFR SERPLBLD CREATININE-BSD FMLA CKD-EPI: 73 ML/MIN/1.73/M2
GLOBULIN SER-MCNC: 3.8 GM/DL (ref 2–3.9)
GLUCOSE SERPL-MCNC: 123 MG/DL (ref 70–115)
HCT VFR BLD AUTO: 22 % (ref 36–52)
HGB BLD-MCNC: 7.6 G/DL (ref 13–18)
MAGNESIUM SERPL-MCNC: 2 MG/DL (ref 1.8–2.4)
MCH RBC QN AUTO: 31.4 PG (ref 27–34)
MCHC RBC AUTO-ENTMCNC: 34.5 G/DL (ref 31–37)
MCV RBC AUTO: 90.9 FL (ref 79–99)
NRBC BLD AUTO-RTO: 0 %
PLATELET # BLD AUTO: 44 X10(3)/MCL (ref 140–371)
PMV BLD AUTO: ABNORMAL FL
POTASSIUM SERPL-SCNC: 3.8 MMOL/L (ref 3.5–5.1)
PROT SERPL-MCNC: 6.1 GM/DL (ref 6.3–8.2)
RBC # BLD AUTO: 2.42 X10(6)/MCL (ref 4–6)
SODIUM SERPL-SCNC: 133 MMOL/L (ref 136–145)
WBC # BLD AUTO: 3.74 X10(3)/MCL (ref 4–11.5)

## 2025-06-27 PROCEDURE — 11000001 HC ACUTE MED/SURG PRIVATE ROOM

## 2025-06-27 PROCEDURE — 85025 COMPLETE CBC W/AUTO DIFF WBC: CPT | Performed by: FAMILY MEDICINE

## 2025-06-27 PROCEDURE — 80053 COMPREHEN METABOLIC PANEL: CPT | Performed by: FAMILY MEDICINE

## 2025-06-27 PROCEDURE — 82140 ASSAY OF AMMONIA: CPT | Performed by: FAMILY MEDICINE

## 2025-06-27 PROCEDURE — 94761 N-INVAS EAR/PLS OXIMETRY MLT: CPT

## 2025-06-27 PROCEDURE — 83735 ASSAY OF MAGNESIUM: CPT | Performed by: FAMILY MEDICINE

## 2025-06-27 PROCEDURE — 36415 COLL VENOUS BLD VENIPUNCTURE: CPT | Performed by: FAMILY MEDICINE

## 2025-06-27 PROCEDURE — 25000003 PHARM REV CODE 250: Performed by: FAMILY MEDICINE

## 2025-06-27 RX ORDER — HALOPERIDOL LACTATE 5 MG/ML
2.5 INJECTION, SOLUTION INTRAMUSCULAR EVERY 6 HOURS PRN
Status: DISCONTINUED | OUTPATIENT
Start: 2025-06-27 | End: 2025-07-03 | Stop reason: HOSPADM

## 2025-06-27 RX ADMIN — Medication 400 MG: at 07:06

## 2025-06-27 RX ADMIN — CHLORPROMAZINE HYDROCHLORIDE 25 MG: 25 TABLET, FILM COATED ORAL at 07:06

## 2025-06-27 RX ADMIN — PROPRANOLOL HYDROCHLORIDE 20 MG: 20 TABLET ORAL at 07:06

## 2025-06-27 RX ADMIN — BACLOFEN 5 MG: 5 TABLET ORAL at 10:06

## 2025-06-27 RX ADMIN — THIAMINE HCL TAB 100 MG 100 MG: 100 TAB at 07:06

## 2025-06-27 RX ADMIN — THIAMINE HCL TAB 100 MG 100 MG: 100 TAB at 05:06

## 2025-06-27 RX ADMIN — LACTULOSE 20 G: 20 SOLUTION ORAL at 05:06

## 2025-06-27 RX ADMIN — FAMOTIDINE 20 MG: 20 TABLET, FILM COATED ORAL at 07:06

## 2025-06-27 RX ADMIN — QUETIAPINE FUMARATE 100 MG: 100 TABLET ORAL at 07:06

## 2025-06-27 NOTE — PLAN OF CARE
Problem: Adult Inpatient Plan of Care  Goal: Plan of Care Review  6/27/2025 0331 by Ambika Dawson RN  Outcome: Progressing  6/27/2025 0331 by Ambiak Dawson RN  Outcome: Progressing  Goal: Patient-Specific Goal (Individualized)  6/27/2025 0331 by Ambika Dawson RN  Outcome: Progressing  6/27/2025 0331 by Ambika Dawson RN  Outcome: Progressing  Goal: Absence of Hospital-Acquired Illness or Injury  6/27/2025 0331 by Ambika Dawson RN  Outcome: Progressing  6/27/2025 0331 by Ambika Dawson RN  Outcome: Progressing  Goal: Optimal Comfort and Wellbeing  6/27/2025 0331 by Ambika Dawson RN  Outcome: Progressing  6/27/2025 0331 by Ambika Dawson RN  Outcome: Progressing  Goal: Readiness for Transition of Care  6/27/2025 0331 by Ambika Dawson RN  Outcome: Not Progressing  6/27/2025 0331 by Ambika Dawson RN  Outcome: Progressing     Problem: Delirium  Goal: Optimal Coping  6/27/2025 0331 by Ambika Dawson RN  Outcome: Not Progressing  6/27/2025 0331 by Ambika Dawson RN  Outcome: Progressing  Goal: Improved Behavioral Control  6/27/2025 0331 by Ambika Dawson RN  Outcome: Not Progressing  6/27/2025 0331 by Ambika Dawson RN  Outcome: Progressing  Goal: Improved Attention and Thought Clarity  6/27/2025 0331 by Ambika Dawson RN  Outcome: Not Progressing  6/27/2025 0331 by Ambika Dawson RN  Outcome: Progressing  Goal: Improved Sleep  6/27/2025 0331 by Ambika Dawson RN  Outcome: Progressing  6/27/2025 0331 by Ambika Dawson RN  Outcome: Progressing     Problem: Fall Injury Risk  Goal: Absence of Fall and Fall-Related Injury  6/27/2025 0331 by Ambika Dawson RN  Outcome: Progressing  6/27/2025 0331 by Ambika Dawson RN  Outcome: Progressing     Problem: Infection  Goal: Absence of Infection Signs and Symptoms  6/27/2025 0331 by Ambika Dawson, RN  Outcome: Progressing  6/27/2025 0331 by Ambika Dawson, RN  Outcome:  Progressing     Problem: Skin Injury Risk Increased  Goal: Skin Health and Integrity  6/27/2025 0331 by Ambika Dawson, RN  Outcome: Progressing  6/27/2025 0331 by Ambika Dawson, RN  Outcome: Progressing    Became very agitated tonight when no one was in the room with him;could not be redirected/reoriented;had to give Haldol, which did help him;family then came back to stay with him tonight;better after both of these were done;meds administered as ordered per md

## 2025-06-27 NOTE — NURSING
"Attempting to get oob without assistance;instructed not to & that it isn't safe;states that he needs to leave;waiting for Jose Angelt;wants us to call her;can't find his phone & blaming us saying that we have it;attempted to calm him down & to explain that it isn't safe for him to get oob;doesn't understand;attempted to get oob several times prior to this time;getting more agitated as time goes on;meds administered earlier per night med pass time but no calming him down;told him that I would have to give him some medication to help him to calm down to stay in bed due to his safety;hollers out "NO" over & over again;says that he did not give consent for us to do this to him;Haldol given at this time due to him not being able to reoriented or talked down;no family at bedside;even raising his arms;called Jose Luis Davenport Supervisor;asked if she could come meet me in his room & told her that he was attempting to hit us(me & CHRIS Rodriguez) in room together;Jose Luis Davenport Supervisor came to help to watch him & hold him so that I could administer Haldol;he screamed "NO" over & over again;security Curry came to room to attempt to talk with him & instruct him to stay in bed while he was looking down at his phones & said that us(the staff) were trying to start a fire in there & holding him against his will;explained that this is all for his safety but he is not redirected;calming down slightly & getting sleepy;security at bedside;also called his Mother about him not being able to find his phone & that he isn't listening to me to stay in bed;thought maybe she could speak with him to maybe calm him down;transferred call to room;they spoke a short time but he was very rude to her on the phone then hung up  "

## 2025-06-27 NOTE — PLAN OF CARE
06/27/25 1611   Discharge Reassessment   Assessment Type Discharge Planning Reassessment   Did the patient's condition or plan change since previous assessment? Yes   Discharge Plan discussed with: Patient;Parent(s)   Name(s) and Number(s) Niyah Mcguire [phone]   Communicated ARIANE with patient/caregiver Date not available/Unable to determine   Discharge Plan A Rehab   Discharge Plan B Psychiatric hospital   DME Needed Upon Discharge  none   Transition of Care Barriers Does not adhere to care plan;Substance Abuse   Why the patient remains in the hospital Requires continued medical care   Post-Acute Status   Post-Acute Authorization Placement   Post-Acute Placement Status Pending medical clearance/testing   Discharge Delays None known at this time

## 2025-06-27 NOTE — PT/OT/SLP PROGRESS
Physical Therapy      Patient Name:  Yong Townsend   MRN:  [id number]    Patient not seen today secondary to Therapist assessment (Patient sedated last night with Haldol, and has been sleeping all day, unable to awaken). Will follow-up 6/27/25.

## 2025-06-27 NOTE — PROGRESS NOTES
6/27/2025  Yong Townsend   1965   [id number]        Psychiatry Progress Note       SUBJECTIVE:   Yong Townsend is a 60 y.o. male with a past medical history that includes HTN, alcohol use, gout, HLD, schizophrenia, vitamin-D deficiency, alcoholic cirrhosis, esophageal varices who presented to [facility] ED on 06/16/25 due to loss of consciousness. In ED documented to have passed out while having a bowel movement and was only responding to pain on arrival. Appears that he was preparing to go to [facility] and went to the bathroom at 0900. Was found unresponsive by his mother around 1030 and 911 called at 1330.  MRI of the brain is negative for any acute infarct, CT head is also negative for any acute findings as well as CT of the head and neck. Initial workup showed ammonia level of 166, hemoglobin 9.3, platelet count 75, sodium 141, potassium 3.9, and dimer of 0.85. He did get CTA head and neck so CTA chest was not obtained.     Psychiatry reconsulted today due to aggression, agitation, and altered mental status. From the interval history he has had episodes of combative behaviors as well as confusion with fluctuations in his ammonia levels. Staff reports he was restless with psychomotor agitation overnight and received 5mg of haloperidol. Unfortunately, he is sedated and unable to participate in evaluation at this time.        Current Medications:   Scheduled Meds:    cholecalciferol (vitamin D3)  50,000 Units Oral Weekly    famotidine  20 mg Oral BID    lactulose  20 g Oral Q6H    losartan  50 mg Oral Daily    magnesium oxide  400 mg Oral BID    potassium chloride  40 mEq Oral Daily    propranoloL  20 mg Oral BID    QUEtiapine  100 mg Oral BID    thiamine  100 mg Oral Q8H      PRN Meds:   Current Facility-Administered Medications:     baclofen, 5 mg, Oral, TID PRN    chlorproMAZINE, 25 mg, Oral, TID PRN    cloNIDine, 0.2 mg, Oral, Q6H PRN    dextrose 50%, 12.5 g, Intravenous, PRN    dextrose 50%, 25 g,  Intravenous, PRN    glucagon (human recombinant), 1 mg, Intramuscular, PRN    glucose, 16 g, Oral, PRN    glucose, 24 g, Oral, PRN    haloperidol lactate, 5 mg, Intramuscular, Q6H PRN    hydrALAZINE, 10 mg, Intravenous, Q6H PRN    ibuprofen, 600 mg, Oral, Q6H PRN    labetaloL, 20 mg, Intravenous, Q4H PRN    lorazepam, 2 mg, Intravenous, Q2H PRN    naloxone, 0.02 mg, Intravenous, PRN    ondansetron, 4 mg, Intravenous, Q8H PRN    prochlorperazine, 5 mg, Intravenous, Q6H PRN    sodium chloride 0.9%, 10 mL, Intravenous, PRN    sodium chloride 0.9%, 10 mL, Intravenous, Q12H PRN    ziprasidone, 20 mg, Intramuscular, Q12H PRN   Psychotherapeutics (From admission, onward)      Start     Stop Route Frequency Ordered    06/26/25 1145  QUEtiapine tablet 100 mg         -- Oral 2 times daily 06/26/25 1032    06/26/25 1132  chlorproMAZINE tablet 25 mg         -- Oral 3 times daily PRN 06/26/25 1032    06/23/25 0615  LORazepam injection 2 mg         -- IV Every 2 hours PRN 06/23/25 0513    06/19/25 2047  ziprasidone injection 20 mg         -- IM Every 12 hours PRN 06/19/25 1947 06/19/25 2047  haloperidol lactate injection 5 mg         -- IM Every 6 hours PRN 06/19/25 1947            Allergies:   Review of patient's allergies indicates:  No Known Allergies     OBJECTIVE:   Vitals   Vitals:    06/27/25 1127   BP: 98/64   Pulse: 67   Resp: 18   Temp: 96.5 °F (35.8 °C)        Labs/Imaging/Studies:   Recent Results (from the past 36 hours)   Comprehensive Metabolic Panel    Collection Time: 06/26/25  7:59 AM   Result Value Ref Range    Sodium 135 (L) 136 - 145 mmol/L    Potassium 4.3 3.5 - 5.1 mmol/L    Chloride 112 (H) 98 - 110 mmol/L    CO2 20 (L) 21 - 32 mmol/L    Glucose 123 (H) 70 - 115 mg/dL    Blood Urea Nitrogen 13 7.0 - 20.0 mg/dL    Creatinine 0.86 0.66 - 1.25 mg/dL    Calcium 9.3 8.4 - 10.2 mg/dL    Protein Total 6.8 6.3 - 8.2 gm/dL    Albumin 2.7 (L) 3.4 - 5.0 g/dL    Globulin 4.1 (H) 2.0 - 3.9 gm/dL    Albumin/Globulin  Ratio 0.7 ratio    Bilirubin Total 2.7 (H) 0.0 - 1.0 mg/dL     50 - 144 unit/L    ALT 33 1 - 45 unit/L    AST 78 (H) 17 - 59 unit/L    eGFR >90 mL/min/1.73/m2    Anion Gap 3.0 2.0 - 13.0 mEq/L    BUN/Creatinine Ratio 15 12 - 20   Magnesium    Collection Time: 06/26/25  7:59 AM   Result Value Ref Range    Magnesium Level 1.80 1.80 - 2.40 mg/dL   CBC with Differential    Collection Time: 06/26/25  7:59 AM   Result Value Ref Range    WBC 7.12 4.00 - 11.50 x10(3)/mcL    RBC 2.72 (L) 4.00 - 6.00 x10(6)/mcL    Hgb 8.3 (L) 13.0 - 18.0 g/dL    Hct 24.7 (L) 36.0 - 52.0 %    MCV 90.8 79.0 - 99.0 fL    MCH 30.5 27.0 - 34.0 pg    MCHC 33.6 31.0 - 37.0 g/dL    RDW 17.3 %    Platelet 59 (L) 140 - 371 x10(3)/mcL    MPV 12.4 9.4 - 12.4 fL    NRBC% 0.0 <=1 %   Manual Differential    Collection Time: 06/26/25  7:59 AM   Result Value Ref Range    Neutrophils % 68 37 - 73 %    Bands % 2 0 - 5 %    Lymphs % 20 20 - 55 %    Monocytes % 8 0 - 10 %    Abnormal Lymphs % 2 %    Neutrophils Abs Calc 4.984 2.1 - 9.2 x10(3)/mcL    Lymphs Abs 1.424 0.6 - 4.6 x10(3)/mcL    Monocytes Abs 0.5696 0.1 - 1.3 x10(3)/mcL    Platelets Decreased (A) Normal, Adequate    Anisocytosis 1+ (A) (none)    Poikilocytosis Slight (A) (none)    Tear Drops Slight (A) (none)   Ammonia    Collection Time: 06/26/25 10:17 AM   Result Value Ref Range    Ammonia Level 89.0 (H) 11.0 - 32.0 umol/L   Comprehensive Metabolic Panel    Collection Time: 06/27/25  4:48 AM   Result Value Ref Range    Sodium 133 (L) 136 - 145 mmol/L    Potassium 3.8 3.5 - 5.1 mmol/L    Chloride 111 (H) 98 - 110 mmol/L    CO2 21 21 - 32 mmol/L    Glucose 123 (H) 70 - 115 mg/dL    Blood Urea Nitrogen 14 7.0 - 20.0 mg/dL    Creatinine 1.15 0.66 - 1.25 mg/dL    Calcium 9.5 8.4 - 10.2 mg/dL    Protein Total 6.1 (L) 6.3 - 8.2 gm/dL    Albumin 2.3 (L) 3.4 - 5.0 g/dL    Globulin 3.8 2.0 - 3.9 gm/dL    Albumin/Globulin Ratio 0.6 ratio    Bilirubin Total 2.2 (H) 0.0 - 1.0 mg/dL    ALP 99 50 - 144  unit/L    ALT 29 1 - 45 unit/L    AST 65 (H) 17 - 59 unit/L    eGFR 73 mL/min/1.73/m2    Anion Gap 1.0 (L) 2.0 - 13.0 mEq/L    BUN/Creatinine Ratio 12 12 - 20   Magnesium    Collection Time: 06/27/25  4:48 AM   Result Value Ref Range    Magnesium Level 2.00 1.80 - 2.40 mg/dL   CBC with Differential    Collection Time: 06/27/25  4:48 AM   Result Value Ref Range    WBC 3.74 (L) 4.00 - 11.50 x10(3)/mcL    RBC 2.42 (L) 4.00 - 6.00 x10(6)/mcL    Hgb 7.6 (L) 13.0 - 18.0 g/dL    Hct 22.0 (L) 36.0 - 52.0 %    MCV 90.9 79.0 - 99.0 fL    MCH 31.4 27.0 - 34.0 pg    MCHC 34.5 31.0 - 37.0 g/dL    RDW 17.1 %    Platelet 44 (L) 140 - 371 x10(3)/mcL    MPV      NRBC% 0.0 <=1 %   Ammonia    Collection Time: 06/27/25 10:52 AM   Result Value Ref Range    Ammonia Level <9.0 (L) 11.0 - 32.0 umol/L          Psychiatric Mental Status Exam:  General Appearance: dressed in hospital garb, lying in bed, in no acute distress  Arousal: obtunded  Behavior: unable to participate  Movements and Motor Activity: no tics, no tremors, no akathisia, no dystonia, no evidence of tardive dyskinesia  Orientation: Unable to Assess  Speech: unable to assess  Mood: Guarded  Affect: calm  Thought Process: Unable to Assess  Associations: Unable to Assess  Thought Content and Perceptions: Unable to Assess  Recent and Remote Memory: Unable to Assess; per interview/observation with patient  Attention and Concentration: Unable to Assess; per interview/observation with patient  Fund of Knowledge: Unable to Assess; based on history, vocabulary, fund of knowledge, syntax, grammar, and content  Insight: impaired; based on understanding of severity of illness and HPI  Judgment: impaired; based on patient's behavior and HPI    ASSESSMENT/PLAN:   Problems Addressed/Diagnoses:  delirium like to due to hepatic encephalopathy  Alcohol use disorder    Plan:  Medication Management  Continue quetiapine 100mg PO BID  Decrease haloperidol to 2.5mg IM Q6hr PRN agitation  Recommend  delirium precautions  Psychiatry will continue to follow        Alfred Fabiano     DATE OF SERVICE: 06/27/25  Patient unable to consent to visit  real-time audio with video   Date the patient was last seen: 06/20/25  Patient location for the visit: [facility] [address] [address]  Provider location for the visit:[address] [place] [id number]  Start time: 1410  End time: 1420

## 2025-06-27 NOTE — NURSING
Confused, aggressive and staying we can't keep him here he is leaving. Attempted to call mom to sit with patient. Code white called. Paranoid and unable to re direct.

## 2025-06-27 NOTE — NURSING
Edith, security, and family at bedside. Yelling we are holding him hostage in the psych be and he is leaving. Attempting to given prn meds but unable to approach patient at this time.

## 2025-06-27 NOTE — PROGRESS NOTES
Hospital Medicine  Progress Note    Patient Name: Yong Townsend  MRN: [id number]  Status: IP- Inpatient   Admission Date: 6/16/2025  Length of Stay: 11  Date of Service: 06/27/2025       CC: hospital follow-up for ams       SUBJECTIVE   60 years old male patient with unknown PMH but taking Trazodone, Baclofen, and Tizanidine was brought to the emergency room to be evaluated for unresponsiveness. Patient is currently pleasantly confused, spontaneously moving extremities in the bed but not following command verbally. Entire history is obtained from emergency and nursing report. I did try to reach to patients mother but unable to reach. As per the report patient was at [facility] this morning and went for the washroom around 9. His mother found him unresponsive in the washroom around 10:30 but for some reason she did not call 911 until 1:30 PM. She thought that patient will be responsive, but he did not so 911 was eventually called. On arrival CT scan of the head, CTA of the head and neck and MRI of the brain were obtained. Neurologist was also consulted. MRI of the brain is negative for any acute infarct, CT head is also negative for any acute findings as well as CT of the head and neck. Initial workup showed ammonia level of 166, hemoglobin 9.3, platelet count 75, sodium 141, potassium 3.9, and dimer of 0.85. Hie did get CTA head and neck so CTA chest was not obtained. On arrival patient's GCS level was only 8 and was only responsive to painful stimuli. During the course of the emergency room he has become more awake and GCS score improved. Before MRI patient was agitated so he required IV Versed and Lorazepam. Medicine team is asked to hospitalize the patient for further care. No fever or diarrhea or reported chest pain or SOB or tingling or numbness or slurred speech or focal weakness prior to unresponsiveness.      06/17/2025 pt admitted with hepatic encephalopathy, h/o significant ETOH use, drinks beer and whisky  according to his mom.  Work up in ER essentially negative.    Pt apparently has 2 epic charts with different MRN, [id number] and this chart [id number] in the original chart he has multiple admissions to [facility] in [place] for alcoholic cirrhosis, h/o esophageal banding and varicees back to 2018, ETOH use, Cocaine +, Marijuana + , Has reportedly seen Dr. Mon in the past, saw DR. Rodriguez in 2022 for varicees after being transferred to [facility] from [facility].  Pt with h/o Thrombocytopenia last 5/2025 was 23, received platelts in [place] at that time.  Mild Curly and anemia requiring transfusions in the past.  His ammonia was 150+ on admit here.  06/18/2025 pt more awake this am, improving mental staus, more sleepy this am  06/19/2025 pt with increased aggitation started mid morning yesterday back on iv ativan needed some precedex overnight due to combative behaviors, currently resting off precedex received iv ativan, on high dose thiamine and MVI iv bannana bags  06/20/2025 pt still confused ativan giving 2mg iv q 2 hrs, pt not taking po ativan  3/21/2025 started taking po meds today with breakfast  Will try adding po taper today   3/22/2025 more awake today but confused  Tolerating po intake   Held ativan this AM   6/23/2025 more responsive today  But confused ammonia level back up today to 74  Will increase lactulose   6/24/2025 ammonia down to 30  Remains pretty weak  But mental status much improved   6/25/2025  Feels better today  Did well with PT   6/26/2025  Ammonia back up today to 89  Worked with PT but very weak  [facility]   6/27/2025  Ammonia level < 9 today  Agitated overnight     Today: Patient seen and examined at bedside, and chart reviewed.       MEDICATIONS   Scheduled   cholecalciferol (vitamin D3)  50,000 Units Oral Weekly    famotidine  20 mg Oral BID    lactulose  20 g Oral Q6H    losartan  50 mg Oral Daily    magnesium oxide  400 mg Oral BID    potassium chloride  40  mEq Oral Daily    propranoloL  20 mg Oral BID    QUEtiapine  100 mg Oral BID    thiamine  100 mg Oral Q8H     Continuous Infusions        PHYSICAL EXAM   VITALS: T 96.5 °F (35.8 °C)   BP 98/64   P 67   RR 18   O2 100 %    GENERAL: Awake and in NAD  LUNGS: CTA B/L  CVS: Normal rate  GI/: Soft, NT, bowel sounds positive.  EXTREMITIES: No peripheral edema  NEURO: AAOx3  PSYCH: Cooperative      LABS   CBC  Recent Labs     06/26/25 0759 06/27/25 0448   WBC 7.12 3.74*   RBC 2.72* 2.42*   HGB 8.3* 7.6*   HCT 24.7* 22.0*   MCV 90.8 90.9   MCH 30.5 31.4   MCHC 33.6 34.5   RDW 17.3 17.1   PLT 59* 44*     CHEM  Recent Labs     06/26/25 0759 06/27/25 0448   * 133*   K 4.3 3.8   CO2 20* 21   BUN 13 14   CREATININE 0.86 1.15   CALCIUM 9.3 9.5   MG 1.80 2.00   ALBUMIN 2.7* 2.3*   GLOBULIN 4.1* 3.8   ALKPHOS 111 99   ALT 33 29   AST 78* 65*   BILITOT 2.7* 2.2*         MICROBIOLOGY     Microbiology Results (last 7 days)       Procedure Component Value Units Date/Time    Blood Culture [[id number]] Collected: 06/22/25 2150    Order Status: Completed Specimen: Blood from Arm Updated: 06/26/25 2300     Blood Culture No Growth At 96 Hours    Blood Culture [[id number]] Collected: 06/22/25 2150    Order Status: Completed Specimen: Blood from Arm Updated: 06/26/25 2300     Blood Culture No Growth At 96 Hours              DIAGNOSTICS   US Abdomen Limited  Narrative: EXAMINATION:  STUDY: US ABDOMEN LIMITED    CLINICAL HISTORY AND TECHNIQUE:  Hepatic encephalopathy    COMPARISON:  None    FINDINGS:  The quality of this examination is very limited due to lack of patient cooperation and difficulty in proper positioning and control of respiration.    Liver:   The liver appears mildly atrophic with a slightly heterogeneous echogenic appearance but no worrisome focal parenchymal abnormalities are dilated biliary radicles.  There is adequate antegrade flow within the portal vein with no evidence of portal venous  "shunting.    Gallbladder/biliary system: The gallbladder is adequately distended with no evidence of cholelithiasis, thickening of the gallbladder wall or pericholecystic edema. The patient was not significantly tender while scanning over the gallbladder. The common bile duct, where visualized, is not dilated nor do I see evidence of intraductal stones.    Miscellaneous: N/a  Impression: 1. Less than optimal examination due to lack of patient cooperation, difficulty and proper positioning and control of respiration.  2. The liver appears mildly atrophic with a slightly heterogeneous echogenic appearance.  These findings are nonspecific but can be seen with cirrhosis and clinical correlation is recommended.    Electronically signed by: Nasir Aden  Date:    06/17/2025  Time:    07:32    Assessment and Plan      Syncope      Hepatic encephalopathy   Ammonia level up to89  Cont lactulose q 6 hr for now   Repeat am level   Telepsych consult today      Likely polypharmacy due to Baclofen,Tizanidine and Trazodone as well these meds are being held     Thrombocytopenia due to cirrhosis  The likely etiology of thrombocytopenia is liver disease. The patients 3 most recent labs are listed below.          Recent Labs     06/18/25  0348 06/19/25  0440   PLT 46* 36*      Plan  - Will transfuse if platelet count is <50k (if undergoing surgical procedure or have active bleeding).  -        H/o GI bleed and esophageal varicees s/p banding 2018     Alcoholic Cirrhosis continue ativan iv, precedex if needed  No seizures or h/o seizures per family  Continue thimine and MVI  Po ativan taper     Hypomagnesemia  Patient has Abnormal Magnesium: hypomagnesemia. Will continue to monitor electrolytes closely. Will replace the affected electrolytes and repeat labs to be done after interventions completed. The patient's magnesium results have been reviewed and are listed below.  No results for input(s): "MG" in the last 24 hours.  Give another " 2gm mag this am     Anemia  Anemia is likely due to chronic blood loss and Iron deficiency. Most recent hemoglobin and hematocrit are listed below.          Recent Labs     06/18/25  0348 06/19/25  0440   HGB 8.3* 7.5*   HCT 25.2* 23.0*      Plan  - Monitor serial CBC: Daily  - Transfuse PRBC if patient becomes hemodynamically unstable, symptomatic or H/H drops below 7/21.  - Patient has not received any PRBC transfusions to date  - Patient's anemia is currently stable  -  check irone and b12, folate levels  Known h/o esophageal varicees  Occult +        HTN  Patients blood pressure range in the last 24 hours was: BP  Min: 68/46  Max: 200/115.The patient's inpatient anti-hypertensive regimen is listed below:  Current Antihypertensives  , 2 times daily, Oral  propranoloL tablet 20 mg, 2 times daily, Oral  labetalol 20 mg/4 mL (5 mg/mL) IV syring, Every 4 hours PRN, Intravenous  hydrALAZINE injection 10 mg, Every 6 hours PRN, Intravenous  cloNIDine tablet 0.2 mg, Every 6 hours PRN, Oral  losartan tablet 50 mg, Daily, Oral     Plan  - BP is uncontrolled, will adjust as follows: add losartan 50mg daily           Fall precautions   Seizure precautions     Decrease ativan to 0.5mg q 6 hrs  Continue Lactulose po TID  Rechek ammonia level in am   Cont PT  Case mgmt- rehab consult   Will keep NPO until patient becomes fully alert.   Gentle IV fluid   UDS neg     Cont PT                  Pawel Dove MD  [facility]

## 2025-06-28 LAB
ABS NEUT CALC (OHS): 2.61 X10(3)/MCL (ref 2.1–9.2)
ALBUMIN SERPL-MCNC: 2.3 G/DL (ref 3.4–5)
ALBUMIN/GLOB SERPL: 0.6 RATIO
ALP SERPL-CCNC: 95 UNIT/L (ref 50–144)
ALT SERPL-CCNC: 32 UNIT/L (ref 1–45)
ANION GAP SERPL CALC-SCNC: 3 MEQ/L (ref 2–13)
ANISOCYTOSIS BLD QL SMEAR: ABNORMAL
AST SERPL-CCNC: 62 UNIT/L (ref 17–59)
BACTERIA BLD CULT: NORMAL
BACTERIA BLD CULT: NORMAL
BILIRUB SERPL-MCNC: 2 MG/DL (ref 0–1)
BUN SERPL-MCNC: 17 MG/DL (ref 7–20)
CALCIUM SERPL-MCNC: 9.2 MG/DL (ref 8.4–10.2)
CHLORIDE SERPL-SCNC: 112 MMOL/L (ref 98–110)
CO2 SERPL-SCNC: 21 MMOL/L (ref 21–32)
CREAT SERPL-MCNC: 1.61 MG/DL (ref 0.66–1.25)
CREAT/UREA NIT SERPL: 11 (ref 12–20)
ELLIPTOCYTOSIS (OHS): ABNORMAL
EOSINOPHIL NFR BLD MANUAL: 0.08 X10(3)/MCL (ref 0–0.9)
EOSINOPHIL NFR BLD MANUAL: 2 % (ref 0–3)
ERYTHROCYTE [DISTWIDTH] IN BLOOD BY AUTOMATED COUNT: 16.8 %
GFR SERPLBLD CREATININE-BSD FMLA CKD-EPI: 49 ML/MIN/1.73/M2
GLOBULIN SER-MCNC: 3.7 GM/DL (ref 2–3.9)
GLUCOSE SERPL-MCNC: 109 MG/DL (ref 70–115)
HCT VFR BLD AUTO: 22.1 % (ref 36–52)
HGB BLD-MCNC: 7.3 G/DL (ref 13–18)
LYMPHOCYTES NFR BLD MANUAL: 0.66 X10(3)/MCL (ref 0.6–4.6)
LYMPHOCYTES NFR BLD MANUAL: 16 % (ref 20–55)
MAGNESIUM SERPL-MCNC: 2 MG/DL (ref 1.8–2.4)
MCH RBC QN AUTO: 30.4 PG (ref 27–34)
MCHC RBC AUTO-ENTMCNC: 33 G/DL (ref 31–37)
MCV RBC AUTO: 92.1 FL (ref 79–99)
MONOCYTES NFR BLD MANUAL: 0.79 X10(3)/MCL (ref 0.1–1.3)
MONOCYTES NFR BLD MANUAL: 19 % (ref 0–10)
NEUTROPHILS NFR BLD MANUAL: 63 % (ref 37–73)
NRBC BLD AUTO-RTO: 0 %
PLATELET # BLD AUTO: 44 X10(3)/MCL (ref 140–371)
PLATELET # BLD EST: ABNORMAL 10*3/UL
PMV BLD AUTO: ABNORMAL FL
POIKILOCYTOSIS BLD QL SMEAR: ABNORMAL
POTASSIUM SERPL-SCNC: 4.1 MMOL/L (ref 3.5–5.1)
PROT SERPL-MCNC: 6 GM/DL (ref 6.3–8.2)
RBC # BLD AUTO: 2.4 X10(6)/MCL (ref 4–6)
RBC MORPH BLD: ABNORMAL
SODIUM SERPL-SCNC: 136 MMOL/L (ref 136–145)
WBC # BLD AUTO: 4.15 X10(3)/MCL (ref 4–11.5)

## 2025-06-28 PROCEDURE — 97116 GAIT TRAINING THERAPY: CPT

## 2025-06-28 PROCEDURE — 25000003 PHARM REV CODE 250: Performed by: FAMILY MEDICINE

## 2025-06-28 PROCEDURE — 11000001 HC ACUTE MED/SURG PRIVATE ROOM

## 2025-06-28 PROCEDURE — 83735 ASSAY OF MAGNESIUM: CPT | Performed by: FAMILY MEDICINE

## 2025-06-28 PROCEDURE — 85025 COMPLETE CBC W/AUTO DIFF WBC: CPT | Performed by: FAMILY MEDICINE

## 2025-06-28 PROCEDURE — 80053 COMPREHEN METABOLIC PANEL: CPT | Performed by: FAMILY MEDICINE

## 2025-06-28 PROCEDURE — 94761 N-INVAS EAR/PLS OXIMETRY MLT: CPT

## 2025-06-28 PROCEDURE — 36415 COLL VENOUS BLD VENIPUNCTURE: CPT | Performed by: FAMILY MEDICINE

## 2025-06-28 RX ADMIN — CHLORPROMAZINE HYDROCHLORIDE 25 MG: 25 TABLET, FILM COATED ORAL at 08:06

## 2025-06-28 RX ADMIN — QUETIAPINE FUMARATE 100 MG: 100 TABLET ORAL at 08:06

## 2025-06-28 RX ADMIN — POTASSIUM CHLORIDE 40 MEQ: 1500 TABLET, EXTENDED RELEASE ORAL at 08:06

## 2025-06-28 RX ADMIN — THIAMINE HCL TAB 100 MG 100 MG: 100 TAB at 05:06

## 2025-06-28 RX ADMIN — Medication 400 MG: at 08:06

## 2025-06-28 RX ADMIN — FAMOTIDINE 20 MG: 20 TABLET, FILM COATED ORAL at 08:06

## 2025-06-28 RX ADMIN — THIAMINE HCL TAB 100 MG 100 MG: 100 TAB at 03:06

## 2025-06-28 RX ADMIN — THIAMINE HCL TAB 100 MG 100 MG: 100 TAB at 08:06

## 2025-06-28 RX ADMIN — BACLOFEN 5 MG: 5 TABLET ORAL at 12:06

## 2025-06-28 RX ADMIN — CHLORPROMAZINE HYDROCHLORIDE 25 MG: 25 TABLET, FILM COATED ORAL at 03:06

## 2025-06-28 RX ADMIN — LACTULOSE 20 G: 20 SOLUTION ORAL at 05:06

## 2025-06-28 RX ADMIN — PROPRANOLOL HYDROCHLORIDE 20 MG: 20 TABLET ORAL at 08:06

## 2025-06-28 NOTE — PROGRESS NOTES
Hospital Medicine  Progress Note    Patient Name: Yong Townsend  MRN: [id number]  Status: IP- Inpatient   Admission Date: 6/16/2025  Length of Stay: 12  Date of Service: 06/28/2025       CC: hospital follow-up for ams       SUBJECTIVE   60 years old male patient with unknown PMH but taking Trazodone, Baclofen, and Tizanidine was brought to the emergency room to be evaluated for unresponsiveness. Patient is currently pleasantly confused, spontaneously moving extremities in the bed but not following command verbally. Entire history is obtained from emergency and nursing report. I did try to reach to patients mother but unable to reach. As per the report patient was at [facility] this morning and went for the washroom around 9. His mother found him unresponsive in the washroom around 10:30 but for some reason she did not call 911 until 1:30 PM. She thought that patient will be responsive, but he did not so 911 was eventually called. On arrival CT scan of the head, CTA of the head and neck and MRI of the brain were obtained. Neurologist was also consulted. MRI of the brain is negative for any acute infarct, CT head is also negative for any acute findings as well as CT of the head and neck. Initial workup showed ammonia level of 166, hemoglobin 9.3, platelet count 75, sodium 141, potassium 3.9, and dimer of 0.85. Hie did get CTA head and neck so CTA chest was not obtained. On arrival patient's GCS level was only 8 and was only responsive to painful stimuli. During the course of the emergency room he has become more awake and GCS score improved. Before MRI patient was agitated so he required IV Versed and Lorazepam. Medicine team is asked to hospitalize the patient for further care. No fever or diarrhea or reported chest pain or SOB or tingling or numbness or slurred speech or focal weakness prior to unresponsiveness.      06/17/2025 pt admitted with hepatic encephalopathy, h/o significant ETOH use, drinks beer and whisky  according to his mom.  Work up in ER essentially negative.    Pt apparently has 2 epic charts with different MRN, [id number] and this chart [id number] in the original chart he has multiple admissions to [facility] in [place] for alcoholic cirrhosis, h/o esophageal banding and varicees back to 2018, ETOH use, Cocaine +, Marijuana + , Has reportedly seen Dr. Mon in the past, saw DR. Rodriguez in 2022 for varicees after being transferred to [facility] from [facility].  Pt with h/o Thrombocytopenia last 5/2025 was 23, received platelts in [place] at that time.  Mild Curly and anemia requiring transfusions in the past.  His ammonia was 150+ on admit here.  06/18/2025 pt more awake this am, improving mental staus, more sleepy this am  06/19/2025 pt with increased aggitation started mid morning yesterday back on iv ativan needed some precedex overnight due to combative behaviors, currently resting off precedex received iv ativan, on high dose thiamine and MVI iv bannana bags  06/20/2025 pt still confused ativan giving 2mg iv q 2 hrs, pt not taking po ativan  3/21/2025 started taking po meds today with breakfast  Will try adding po taper today   3/22/2025 more awake today but confused  Tolerating po intake   Held ativan this AM   6/23/2025 more responsive today  But confused ammonia level back up today to 74  Will increase lactulose   6/24/2025 ammonia down to 30  Remains pretty weak  But mental status much improved   6/25/2025  Feels better today  Did well with PT   6/26/2025  Ammonia back up today to 89  Worked with PT but very weak  Interested in going to [facility] rehab   6/27/2025  Ammonia level < 9 today  Agitated overnight     06/28/2025 pt still very confused about his dates and times, oriented to self and place somewhat, intitially thought he was in [place], able to be redirected seems to confabulate a lot.  He has a very extensive vocabulary and is very intelligent but very limited on details and gets aggitated when  challenged on his details especially related to recent behaviors in hospital.    Today: Patient seen and examined at bedside, and chart reviewed.       MEDICATIONS   Scheduled   cholecalciferol (vitamin D3)  50,000 Units Oral Weekly    famotidine  20 mg Oral BID    lactulose  20 g Oral Q6H    losartan  50 mg Oral Daily    magnesium oxide  400 mg Oral BID    potassium chloride  40 mEq Oral Daily    propranoloL  20 mg Oral BID    QUEtiapine  100 mg Oral BID    thiamine  100 mg Oral Q8H     Continuous Infusions    ROS limited due to mental status    PHYSICAL EXAM   VITALS: T 96.8 °F (36 °C)   BP (!) 91/50   P 77   RR 16   O2 100 %    GENERAL: Awake and in NAD  LUNGS: CTA B/L  CVS: Normal rate  GI/: Soft, NT, bowel sounds positive.  EXTREMITIES: No peripheral edema  NEURO: AAOx3  PSYCH: Cooperative      LABS   CBC  Recent Labs     06/27/25  0448 06/28/25  0502   WBC 3.74* 4.15   RBC 2.42* 2.40*   HGB 7.6* 7.3*   HCT 22.0* 22.1*   MCV 90.9 92.1   MCH 31.4 30.4   MCHC 34.5 33.0   RDW 17.1 16.8   PLT 44* 44*     CHEM  Recent Labs     06/27/25  0448 06/28/25  0502   * 136   K 3.8 4.1   CO2 21 21   BUN 14 17   CREATININE 1.15 1.61*   CALCIUM 9.5 9.2   MG 2.00 2.00   ALBUMIN 2.3* 2.3*   GLOBULIN 3.8 3.7   ALKPHOS 99 95   ALT 29 32   AST 65* 62*   BILITOT 2.2* 2.0*         MICROBIOLOGY     Microbiology Results (last 7 days)       Procedure Component Value Units Date/Time    Blood Culture [[id number]] Collected: 06/22/25 2150    Order Status: Completed Specimen: Blood from Arm Updated: 06/28/25 0715     Blood Culture No Growth at 5 days    Blood Culture [[id number]] Collected: 06/22/25 2150    Order Status: Completed Specimen: Blood from Arm Updated: 06/28/25 0715     Blood Culture No Growth at 5 days              DIAGNOSTICS   US Abdomen Limited  Narrative: EXAMINATION:  STUDY: US ABDOMEN LIMITED    CLINICAL HISTORY AND TECHNIQUE:  Hepatic encephalopathy    COMPARISON:  None    FINDINGS:  The quality of this  examination is very limited due to lack of patient cooperation and difficulty in proper positioning and control of respiration.    Liver:   The liver appears mildly atrophic with a slightly heterogeneous echogenic appearance but no worrisome focal parenchymal abnormalities are dilated biliary radicles.  There is adequate antegrade flow within the portal vein with no evidence of portal venous shunting.    Gallbladder/biliary system: The gallbladder is adequately distended with no evidence of cholelithiasis, thickening of the gallbladder wall or pericholecystic edema. The patient was not significantly tender while scanning over the gallbladder. The common bile duct, where visualized, is not dilated nor do I see evidence of intraductal stones.    Miscellaneous: N/a  Impression: 1. Less than optimal examination due to lack of patient cooperation, difficulty and proper positioning and control of respiration.  2. The liver appears mildly atrophic with a slightly heterogeneous echogenic appearance.  These findings are nonspecific but can be seen with cirrhosis and clinical correlation is recommended.    Electronically signed by: Nasir Aden  Date:    06/17/2025  Time:    07:32    Assessment and Plan      Syncope      Hepatic encephalopathy also may have some ETOH induced encephalopathy  Cont lactulose q 6 hr for now   Ammonia level is down this am  Repeat am level   Telepsych consulted has been following     Likely polypharmacy due to Baclofen,Tizanidine and Trazodone as well these meds are being held since admit     Thrombocytopenia due to cirrhosis  The likely etiology of thrombocytopenia is liver disease. The patients 3 most recent labs are listed below.          Recent Labs     06/18/25  0348 06/19/25  0440   PLT 46* 36*      Plan  - Will transfuse if platelet count is <50k (if undergoing surgical procedure or have active bleeding).  -        H/o GI bleed and esophageal varicees s/p banding 2018     Alcoholic Cirrhosis  "continue ativan iv, precedex if needed  No seizures or h/o seizures per family  Continue thimine and MVI  Po ativan taper     Hypomagnesemia  Patient has Abnormal Magnesium: hypomagnesemia. Will continue to monitor electrolytes closely. Will replace the affected electrolytes and repeat labs to be done after interventions completed. The patient's magnesium results have been reviewed and are listed below.  No results for input(s): "MG" in the last 24 hours.  Give another 2gm mag this am     Anemia  Anemia is likely due to chronic blood loss and Iron deficiency. Most recent hemoglobin and hematocrit are listed below.          Recent Labs     06/18/25  0348 06/19/25  0440   HGB 8.3* 7.5*   HCT 25.2* 23.0*      Plan  - Monitor serial CBC: Daily  - Transfuse PRBC if patient becomes hemodynamically unstable, symptomatic or H/H drops below 7/21.  - Patient has not received any PRBC transfusions to date  - Patient's anemia is currently stable  -  check irone and b12, folate levels  Known h/o esophageal varicees  Occult +        HTN  Patients blood pressure range in the last 24 hours was: BP  Min: 68/46  Max: 200/115.The patient's inpatient anti-hypertensive regimen is listed below:  Current Antihypertensives  , 2 times daily, Oral  propranoloL tablet 20 mg, 2 times daily, Oral  labetalol 20 mg/4 mL (5 mg/mL) IV syring, Every 4 hours PRN, Intravenous  hydrALAZINE injection 10 mg, Every 6 hours PRN, Intravenous  cloNIDine tablet 0.2 mg, Every 6 hours PRN, Oral  losartan tablet 50 mg, Daily, Oral     Plan  - BP is uncontrolled, will adjust as follows: add losartan 50mg daily           Fall precautions   Seizure precautions      Continue Lactulose p   Rechek ammonia level in am and other labs  Cont PT  Case mgmt- rehab consult   Gentle IV fluid   UDS neg     Cont PT                   Taisha Cesar MD  Hospital Medicine   "

## 2025-06-28 NOTE — PLAN OF CARE
Problem: Adult Inpatient Plan of Care  Goal: Plan of Care Review  Outcome: Progressing  Goal: Patient-Specific Goal (Individualized)  Outcome: Progressing  Goal: Absence of Hospital-Acquired Illness or Injury  Outcome: Progressing  Goal: Optimal Comfort and Wellbeing  Outcome: Progressing  Goal: Readiness for Transition of Care  Outcome: Progressing     Problem: Delirium  Goal: Optimal Coping  Outcome: Progressing  Goal: Improved Behavioral Control  Outcome: Progressing  Goal: Improved Attention and Thought Clarity  Outcome: Progressing  Goal: Improved Sleep  Outcome: Progressing     Problem: Fall Injury Risk  Goal: Absence of Fall and Fall-Related Injury  Outcome: Progressing     Problem: Infection  Goal: Absence of Infection Signs and Symptoms  Outcome: Progressing     Problem: Skin Injury Risk Increased  Goal: Skin Health and Integrity  Outcome: Progressing    Was being quite aggressive & agitated as I came on shift;however since that time he has not had any episodes so far of this sort;slept;s/o at bedside;still has hiccups at times;meds administered as ordered per md

## 2025-06-28 NOTE — PT/OT/SLP PROGRESS
Physical Therapy Treatment    Patient Name:  Yong Townsend   MRN:  [id number]    Recommendations:     Discharge Recommendations: Low Intensity Therapy  Discharge Equipment Recommendations: none  Barriers to discharge: current medical status    Assessment:     Yong Townsend is a 60 y.o. male admitted with a medical diagnosis of Alcoholic encephalopathy.  He presents with the following impairments/functional limitations: weakness, impaired endurance, impaired functional mobility, gait instability, impaired balance, impaired cognition, decreased safety awareness, decreased lower extremity function     Family advised nurses station that patient needed to go to bathroom. Patient assisted to sit EOB and then to standing. Patient used HHA and min/mod A to perform gait training to restroom. Patient with short, slow steps and with wide GLENNY. Patient corrected posture on verbal cues, but returned to a hunched over position after. Patient sat on toilet and had large BM. Patient used grab bar and stood, with therapist assisting him with perineal care due to unsteadiness. Patient still remains slightly confused at times, but able to be reoriented. Required HHA with min/mod A due to unsteadiness while walking back to bed. Patient required max A for safety cues while walking, including for posture and turns. Patient returned to sit EOB but required max tactile cues to sit safely on edge of bed. Patient returned to supine with mod A and required mod A to be repositioned for comfort. Patient left with HOB elevated and all needs in reach.     Rehab Prognosis: Good; patient would benefit from acute skilled PT services to address these deficits and reach maximum level of function.    Recent Surgery: * No surgery found *      Plan:     During this hospitalization, patient to be seen 5 x/week (5-6x weekly/1-2x daily) to address the identified rehab impairments via gait training, therapeutic activities, therapeutic exercises and  progress toward the following goals:    Plan of Care Expires:  25    Subjective     Chief Complaint: none verbalized  Patient/Family Comments/goals: to go home  Pain/Comfort:         Objective:     Communicated with nursing prior to session.  Patient found HOB elevated with bed alarm, telemetry, blood pressure cuff, pulse ox (continuous), peripheral IV upon PT entry to room.     General Precautions: Standard, fall  Orthopedic Precautions: N/A  Braces: N/A  Respiratory Status: Room air     Functional Mobility:  Bed Mobility:     Supine to Sit: minimum assistance  Sit to Supine: moderate assistance  Transfers:     Sit to Stand:  minimum assistance and moderate assistance with hand-held assist  Gait: 20 feet to restroom, 20 feet back to bed with HHA and min/mod A   Balance: required min/mod A to walk today      AM-PAC 6 CLICK MOBILITY          Treatment & Education:  See above    Patient left HOB elevated with all lines intact, call button in reach, bed alarm on, nurse notified, and visitors present..    GOALS:   Multidisciplinary Problems       Physical Therapy Goals          Problem: Physical Therapy    Goal Priority Disciplines Outcome Interventions   Physical Therapy Goal     PT, PT/OT Progressing    Description: Goals to be met by: discharge     Patient will increase functional independence with mobility by performin. Supine to sit with Stand-by Assistance  2. Sit to stand transfer with Stand-by Assistance  3. Gait  x 75 feet with Stand-by Assistance using No Assistive Device.                          DME Justifications:  No DME recommended requiring DME justifications    Time Tracking:     PT Received On: 25  PT Start Time: 1155     PT Stop Time: 1212  PT Total Time (min): 17 min     Billable Minutes: Gait Training 17    Treatment Type: Treatment  PT/PTA: PT           2025

## 2025-06-29 LAB
ABS NEUT CALC (OHS): 1.97 X10(3)/MCL (ref 2.1–9.2)
ALBUMIN SERPL-MCNC: 2.4 G/DL (ref 3.4–5)
ALBUMIN/GLOB SERPL: 0.6 RATIO
ALP SERPL-CCNC: 180 UNIT/L (ref 50–144)
ALT SERPL-CCNC: 39 UNIT/L (ref 1–45)
AMMONIA PLAS-MSCNC: 58 UMOL/L (ref 11–32)
ANION GAP SERPL CALC-SCNC: 4 MEQ/L (ref 2–13)
ANISOCYTOSIS BLD QL SMEAR: ABNORMAL
AST SERPL-CCNC: 76 UNIT/L (ref 17–59)
BILIRUB SERPL-MCNC: 1.9 MG/DL (ref 0–1)
BUN SERPL-MCNC: 17 MG/DL (ref 7–20)
CALCIUM SERPL-MCNC: 9.2 MG/DL (ref 8.4–10.2)
CHLORIDE SERPL-SCNC: 112 MMOL/L (ref 98–110)
CO2 SERPL-SCNC: 23 MMOL/L (ref 21–32)
CREAT SERPL-MCNC: 1.16 MG/DL (ref 0.66–1.25)
CREAT/UREA NIT SERPL: 15 (ref 12–20)
ELLIPTOCYTOSIS (OHS): ABNORMAL
EOSINOPHIL NFR BLD MANUAL: 0.14 X10(3)/MCL (ref 0–0.9)
EOSINOPHIL NFR BLD MANUAL: 4 % (ref 0–3)
ERYTHROCYTE [DISTWIDTH] IN BLOOD BY AUTOMATED COUNT: 16.8 %
GFR SERPLBLD CREATININE-BSD FMLA CKD-EPI: 72 ML/MIN/1.73/M2
GLOBULIN SER-MCNC: 4 GM/DL (ref 2–3.9)
GLUCOSE SERPL-MCNC: 97 MG/DL (ref 70–115)
HCT VFR BLD AUTO: 22.7 % (ref 36–52)
HGB BLD-MCNC: 7.7 G/DL (ref 13–18)
LYMPHOCYTES NFR BLD MANUAL: 0.82 X10(3)/MCL (ref 0.6–4.6)
LYMPHOCYTES NFR BLD MANUAL: 24 % (ref 20–55)
MAGNESIUM SERPL-MCNC: 1.7 MG/DL (ref 1.8–2.4)
MCH RBC QN AUTO: 30.9 PG (ref 27–34)
MCHC RBC AUTO-ENTMCNC: 33.9 G/DL (ref 31–37)
MCV RBC AUTO: 91.2 FL (ref 79–99)
MONOCYTES NFR BLD MANUAL: 0.48 X10(3)/MCL (ref 0.1–1.3)
MONOCYTES NFR BLD MANUAL: 14 % (ref 0–10)
NEUTROPHILS NFR BLD MANUAL: 58 % (ref 37–73)
NRBC BLD AUTO-RTO: 0 %
PLATELET # BLD AUTO: 40 X10(3)/MCL (ref 140–371)
PLATELET # BLD EST: ABNORMAL 10*3/UL
PMV BLD AUTO: ABNORMAL FL
POIKILOCYTOSIS BLD QL SMEAR: ABNORMAL
POTASSIUM SERPL-SCNC: 4 MMOL/L (ref 3.5–5.1)
PROT SERPL-MCNC: 6.4 GM/DL (ref 6.3–8.2)
RBC # BLD AUTO: 2.49 X10(6)/MCL (ref 4–6)
RBC MORPH BLD: ABNORMAL
SODIUM SERPL-SCNC: 139 MMOL/L (ref 136–145)
WBC # BLD AUTO: 3.4 X10(3)/MCL (ref 4–11.5)

## 2025-06-29 PROCEDURE — 36415 COLL VENOUS BLD VENIPUNCTURE: CPT | Performed by: FAMILY MEDICINE

## 2025-06-29 PROCEDURE — 25000003 PHARM REV CODE 250: Performed by: FAMILY MEDICINE

## 2025-06-29 PROCEDURE — 11000001 HC ACUTE MED/SURG PRIVATE ROOM

## 2025-06-29 PROCEDURE — 83735 ASSAY OF MAGNESIUM: CPT | Performed by: FAMILY MEDICINE

## 2025-06-29 PROCEDURE — 85025 COMPLETE CBC W/AUTO DIFF WBC: CPT | Performed by: FAMILY MEDICINE

## 2025-06-29 PROCEDURE — 82140 ASSAY OF AMMONIA: CPT | Performed by: FAMILY MEDICINE

## 2025-06-29 PROCEDURE — 94761 N-INVAS EAR/PLS OXIMETRY MLT: CPT

## 2025-06-29 PROCEDURE — 80053 COMPREHEN METABOLIC PANEL: CPT | Performed by: FAMILY MEDICINE

## 2025-06-29 RX ORDER — LANOLIN ALCOHOL/MO/W.PET/CERES
800 CREAM (GRAM) TOPICAL 2 TIMES DAILY
Status: DISCONTINUED | OUTPATIENT
Start: 2025-06-29 | End: 2025-07-03 | Stop reason: HOSPADM

## 2025-06-29 RX ADMIN — BACLOFEN 5 MG: 5 TABLET ORAL at 06:06

## 2025-06-29 RX ADMIN — CHLORPROMAZINE HYDROCHLORIDE 25 MG: 25 TABLET, FILM COATED ORAL at 05:06

## 2025-06-29 RX ADMIN — LACTULOSE 20 G: 20 SOLUTION ORAL at 05:06

## 2025-06-29 RX ADMIN — PROPRANOLOL HYDROCHLORIDE 20 MG: 20 TABLET ORAL at 08:06

## 2025-06-29 RX ADMIN — LOSARTAN POTASSIUM 50 MG: 50 TABLET, FILM COATED ORAL at 08:06

## 2025-06-29 RX ADMIN — THIAMINE HCL TAB 100 MG 100 MG: 100 TAB at 05:06

## 2025-06-29 RX ADMIN — QUETIAPINE FUMARATE 100 MG: 100 TABLET ORAL at 08:06

## 2025-06-29 RX ADMIN — FAMOTIDINE 20 MG: 20 TABLET, FILM COATED ORAL at 08:06

## 2025-06-29 RX ADMIN — POTASSIUM CHLORIDE 40 MEQ: 1500 TABLET, EXTENDED RELEASE ORAL at 08:06

## 2025-06-29 RX ADMIN — THIAMINE HCL TAB 100 MG 100 MG: 100 TAB at 08:06

## 2025-06-29 RX ADMIN — THIAMINE HCL TAB 100 MG 100 MG: 100 TAB at 01:06

## 2025-06-29 RX ADMIN — BACLOFEN 5 MG: 5 TABLET ORAL at 08:06

## 2025-06-29 RX ADMIN — CHLORPROMAZINE HYDROCHLORIDE 25 MG: 25 TABLET, FILM COATED ORAL at 01:06

## 2025-06-29 RX ADMIN — LACTULOSE 20 G: 20 SOLUTION ORAL at 11:06

## 2025-06-29 RX ADMIN — Medication 800 MG: at 08:06

## 2025-06-29 RX ADMIN — Medication 400 MG: at 08:06

## 2025-06-29 NOTE — PROGRESS NOTES
"Subjective: "I'm all right today."  Yong Townsend is a 60 y.o. male with a past medical history that includes HTN, alcohol use, gout, HLD, schizophrenia, vitamin-D deficiency, alcoholic cirrhosis, esophageal varices who presented to [facility] ED on 06/16/25 due to loss of consciousness. In ED documented to have passed out while having a bowel movement and was only responding to pain on arrival. Appears that he was preparing to go to [facility] and went to the bathroom at 0900. Was found unresponsive by his mother around 1030 and 911 called at 1330.  MRI of the brain is negative for any acute infarct, CT head is also negative for any acute findings as well as CT of the head and neck. Initial workup showed ammonia level of 166, hemoglobin 9.3, platelet count 75, sodium 141, potassium 3.9, and dimer of 0.85. He did get CTA head and neck so CTA chest was not obtained.     Today:   Slept most of the night last night, but made it clear to staff he didn't want to be bothered last night. During the day today, took a shower and moved around a little and was less agitated. Sitting with "Macie" at BS. Reports feeling better today, tierney after shower. "He really hasn't stopped eating today". Reports mood "better" today, admits to being "grouchy" yesterday but better today. Has been having complaints of hiccups today (have had since childhood)b , even during assessment. 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 At times confused and disorganized but when repeated usually able to respond appropriately. Has been compliant with meds PO-appears to be tolerating. Fair energy level today. Sitting up in bed. Can make needs known when he needs to, assistance with ADLs and tasks. Denies using dreams/cravings. Oriented to month/year only, makes excuses about not needing to keep up with specific dates, but is aware today is Saturday.    Current Outpatient Medications on File Prior to Encounter   Medication Sig    baclofen " (LIORESAL) 20 MG tablet Take 20 mg by mouth 3 (three) times daily as needed (HICCUPS).    naproxen (NAPROSYN) 500 MG tablet Take 500 mg by mouth 2 (two) times daily.    pantoprazole (PROTONIX) 40 MG tablet Take 40 mg by mouth once daily.    propranoloL (INDERAL) 20 MG tablet Take 20 mg by mouth 2 (two) times daily.    sildenafiL (VIAGRA) 100 MG tablet Take 100 mg by mouth daily as needed for Erectile Dysfunction.    tiZANidine (ZANAFLEX) 4 MG tablet Take 4 mg by mouth 3 (three) times daily as needed (MUSCLE SPASMS).    traZODone (DESYREL) 100 MG tablet Take 100 mg by mouth every evening.        Allergies:   Review of patient's allergies indicates:  No Known Allergies       Objective:  Vital signs:  Temp:  [96.8 °F (36 °C)-97.7 °F (36.5 °C)] 96.8 °F (36 °C)  Pulse:  [72-91] 77  Resp:  [16-20] 16  SpO2:  [98 %-100 %] 100 %  BP: ()/(44-82) 91/50    Mental Status Evaluation:  Appearance:  thin & gaunt looking, hospital gown   Behavior:  eye contact normal, more cooperative than previous reports   Speech:  pressured, talkative at times, with hiccups interrupting often   Mood:  anxious   Affect:  blunted   Thought Process:  circumstantial, dec illogical but still some periods, able to be redirected   Thought Content:  Denies SI/HI, denies AH/VH, questionable delusions   Sensorium:  person, place, day of week, month of year, year   Cognition:  memory > able to remember recent events- Yes, able to remember remote events- No, forgetful   Insight:  limited   Judgment:  limited     Assessment/Plan:  Problems Addressed/Diagnoses:  delirium like to due to hepatic encephalopathy  Alcohol use disorder     Plan:  1.) cont current POC, delirium precs/orientation techniques  2.) psyc psych off (consider dec seroquel if sedation/somnolence related)

## 2025-06-29 NOTE — PROGRESS NOTES
Hospital Medicine  Progress Note    Patient Name: Yong Townsend  MRN: [id number]  Status: IP- Inpatient   Admission Date: 6/16/2025  Length of Stay: 13  Date of Service: 06/29/2025       CC: hospital follow-up for ams       SUBJECTIVE   60 years old male patient with unknown PMH but taking Trazodone, Baclofen, and Tizanidine was brought to the emergency room to be evaluated for unresponsiveness. Patient is currently pleasantly confused, spontaneously moving extremities in the bed but not following command verbally. Entire history is obtained from emergency and nursing report. I did try to reach to patients mother but unable to reach. As per the report patient was at [facility] this morning and went for the washroom around 9. His mother found him unresponsive in the washroom around 10:30 but for some reason she did not call 911 until 1:30 PM. She thought that patient will be responsive, but he did not so 911 was eventually called. On arrival CT scan of the head, CTA of the head and neck and MRI of the brain were obtained. Neurologist was also consulted. MRI of the brain is negative for any acute infarct, CT head is also negative for any acute findings as well as CT of the head and neck. Initial workup showed ammonia level of 166, hemoglobin 9.3, platelet count 75, sodium 141, potassium 3.9, and dimer of 0.85. Hie did get CTA head and neck so CTA chest was not obtained. On arrival patient's GCS level was only 8 and was only responsive to painful stimuli. During the course of the emergency room he has become more awake and GCS score improved. Before MRI patient was agitated so he required IV Versed and Lorazepam. Medicine team is asked to hospitalize the patient for further care. No fever or diarrhea or reported chest pain or SOB or tingling or numbness or slurred speech or focal weakness prior to unresponsiveness.      06/17/2025 pt admitted with hepatic encephalopathy, h/o significant ETOH use, drinks beer and whisky  according to his mom.  Work up in ER essentially negative.    Pt apparently has 2 epic charts with different MRN, [id number] and this chart [id number] in the original chart he has multiple admissions to [facility] in [place] for alcoholic cirrhosis, h/o esophageal banding and varicees back to 2018, ETOH use, Cocaine +, Marijuana + , Has reportedly seen Dr. Mon in the past, saw DR. Rodriguez in 2022 for varicees after being transferred to [facility] from [facility].  Pt with h/o Thrombocytopenia last 5/2025 was 23, received platelts in [place] at that time.  Mild Curly and anemia requiring transfusions in the past.  His ammonia was 150+ on admit here.  06/18/2025 pt more awake this am, improving mental staus, more sleepy this am  06/19/2025 pt with increased aggitation started mid morning yesterday back on iv ativan needed some precedex overnight due to combative behaviors, currently resting off precedex received iv ativan, on high dose thiamine and MVI iv bannana bags  06/20/2025 pt still confused ativan giving 2mg iv q 2 hrs, pt not taking po ativan  3/21/2025 started taking po meds today with breakfast  Will try adding po taper today   3/22/2025 more awake today but confused  Tolerating po intake   Held ativan this AM   6/23/2025 more responsive today  But confused ammonia level back up today to 74  Will increase lactulose   6/24/2025 ammonia down to 30  Remains pretty weak  But mental status much improved   6/25/2025  Feels better today  Did well with PT   6/26/2025  Ammonia back up today to 89  Worked with PT but very weak  Interested in going to [facility] rehab   6/27/2025  Ammonia level < 9 today  Agitated overnight     06/28/2025 pt still very confused about his dates and times, oriented to self and place somewhat, intitially thought he was in [place], able to be redirected seems to confabulate a lot.  He has a very extensive vocabulary and is very intelligent but very limited on details and gets aggitated when  challenged on his details especially related to recent behaviors in hospital.  06/29/2025 confusion is a bit less, still confabulates and very poor short term memory, less aggitated    Today: Patient seen and examined at bedside, and chart reviewed.       MEDICATIONS   Scheduled   cholecalciferol (vitamin D3)  50,000 Units Oral Weekly    famotidine  20 mg Oral BID    lactulose  20 g Oral Q6H    losartan  50 mg Oral Daily    magnesium oxide  400 mg Oral BID    potassium chloride  40 mEq Oral Daily    propranoloL  20 mg Oral BID    QUEtiapine  100 mg Oral BID    thiamine  100 mg Oral Q8H     Continuous Infusions    ROS limited due to mental status    PHYSICAL EXAM   VITALS: T 98 °F (36.7 °C)   /63   P 92   RR 18   O2 100 %    GENERAL: Awake and in NAD  LUNGS: CTA B/L  CVS: Normal rate  GI/: Soft, NT, bowel sounds positive.  EXTREMITIES: No peripheral edema  NEURO: AAOx3  PSYCH: Cooperative      LABS   CBC  Recent Labs     06/28/25  0502 06/29/25  0600   WBC 4.15 3.40*   RBC 2.40* 2.49*   HGB 7.3* 7.7*   HCT 22.1* 22.7*   MCV 92.1 91.2   MCH 30.4 30.9   MCHC 33.0 33.9   RDW 16.8 16.8   PLT 44* 40*     CHEM  Recent Labs     06/28/25  0502 06/29/25  0600    139   K 4.1 4.0   CO2 21 23   BUN 17 17   CREATININE 1.61* 1.16   CALCIUM 9.2 9.2   MG 2.00 1.70*   ALBUMIN 2.3* 2.4*   GLOBULIN 3.7 4.0*   ALKPHOS 95 180*   ALT 32 39   AST 62* 76*   BILITOT 2.0* 1.9*         MICROBIOLOGY     Microbiology Results (last 7 days)       Procedure Component Value Units Date/Time    Blood Culture [[id number]] Collected: 06/22/25 2150    Order Status: Completed Specimen: Blood from Arm Updated: 06/28/25 0715     Blood Culture No Growth at 5 days    Blood Culture [[id number]] Collected: 06/22/25 2150    Order Status: Completed Specimen: Blood from Arm Updated: 06/28/25 0715     Blood Culture No Growth at 5 days              DIAGNOSTICS   US Abdomen Limited  Narrative: EXAMINATION:  STUDY: US ABDOMEN LIMITED    CLINICAL HISTORY  AND TECHNIQUE:  Hepatic encephalopathy    COMPARISON:  None    FINDINGS:  The quality of this examination is very limited due to lack of patient cooperation and difficulty in proper positioning and control of respiration.    Liver:   The liver appears mildly atrophic with a slightly heterogeneous echogenic appearance but no worrisome focal parenchymal abnormalities are dilated biliary radicles.  There is adequate antegrade flow within the portal vein with no evidence of portal venous shunting.    Gallbladder/biliary system: The gallbladder is adequately distended with no evidence of cholelithiasis, thickening of the gallbladder wall or pericholecystic edema. The patient was not significantly tender while scanning over the gallbladder. The common bile duct, where visualized, is not dilated nor do I see evidence of intraductal stones.    Miscellaneous: N/a  Impression: 1. Less than optimal examination due to lack of patient cooperation, difficulty and proper positioning and control of respiration.  2. The liver appears mildly atrophic with a slightly heterogeneous echogenic appearance.  These findings are nonspecific but can be seen with cirrhosis and clinical correlation is recommended.    Electronically signed by: Nasir Aden  Date:    06/17/2025  Time:    07:32    Assessment and Plan      Syncope      Hepatic encephalopathy also may have some ETOH induced encephalopathy  Cont lactulose q 6 hr for now   Ammonia level up to 58, continue lacrtulose  Repeat am level   Telepsych consulted has been following     Likely polypharmacy due to Baclofen,Tizanidine and Trazodone as well these meds are being held since admit     Thrombocytopenia due to cirrhosis  The likely etiology of thrombocytopenia is liver disease. The patients 3 most recent labs are listed below.          Recent Labs     06/18/25  0348 06/19/25  0440   PLT 46* 36*      Plan  - Will transfuse if platelet count is <50k (if undergoing surgical procedure or  "have active bleeding).  -        H/o GI bleed and esophageal varicees s/p banding 2018     Alcoholic Cirrhosis continue ativan iv, precedex if needed  No seizures or h/o seizures per family  Continue thimine and MVI  Po ativan taper     Hypomagnesemia  Patient with noted electrolyte deficiencies with Hypo-Magnesemia. Latest electrolytes have been reviewed and values are   Magnesium Level   Date Value Ref Range Status   06/29/2025 1.70 (L) 1.80 - 2.40 mg/dL Final   . Will continue to monitor electrolytes closely. Will replace the affected electrolytes and repeat labs to be done after interventions completed.  Increase po to 800mg bid  Give 2gm iv prn    Patient has Abnormal Magnesium: hypomagnesemia. Will continue to monitor electrolytes closely. Will replace the affected electrolytes and repeat labs to be done after interventions completed. The patient's magnesium results have been reviewed and are listed below.  No results for input(s): "MG" in the last 24 hours.        Anemia  Anemia is likely due to chronic blood loss and Iron deficiency. Most recent hemoglobin and hematocrit are listed below.          Recent Labs     06/18/25  0348 06/19/25  0440   HGB 8.3* 7.5*   HCT 25.2* 23.0*      Plan  - Monitor serial CBC: Daily  - Transfuse PRBC if patient becomes hemodynamically unstable, symptomatic or H/H drops below 7/21.  - Patient has not received any PRBC transfusions to date  - Patient's anemia is currently stable  -  check irone and b12, folate levels  Known h/o esophageal varicees  Occult +        HTN  Patients blood pressure range in the last 24 hours was: BP  Min: 68/46  Max: 200/115.The patient's inpatient anti-hypertensive regimen is listed below:  Current Antihypertensives  , 2 times daily, Oral  propranoloL tablet 20 mg, 2 times daily, Oral  labetalol 20 mg/4 mL (5 mg/mL) IV syring, Every 4 hours PRN, Intravenous  hydrALAZINE injection 10 mg, Every 6 hours PRN, Intravenous  cloNIDine tablet 0.2 mg, Every 6 " hours PRN, Oral  losartan tablet 50 mg, Daily, Oral     Plan  -Patients blood pressure range in the last 24 hours was: BP  Min: 68/46  Max: 200/115.The patient's inpatient anti-hypertensive regimen is listed below:  Current Antihypertensives  , 2 times daily, Oral  propranoloL tablet 20 mg, 2 times daily, Oral  labetalol 20 mg/4 mL (5 mg/mL) IV syring, Every 4 hours PRN, Intravenous  hydrALAZINE injection 10 mg, Every 6 hours PRN, Intravenous  cloNIDine tablet 0.2 mg, Every 6 hours PRN, Oral  losartan tablet 50 mg, Daily, Oral    Plan  - BP is controlled, no changes needed to their regimen  -               Fall precautions   Seizure precautions      Continue Lactulose p   Rechek ammonia level in am and other labs  Cont PT  Case mgmt- rehab consult   Gentle IV fluid   UDS neg     Cont PT                   Taisha Cesar MD  [facility]

## 2025-06-29 NOTE — PLAN OF CARE
Problem: Adult Inpatient Plan of Care  Goal: Plan of Care Review  Outcome: Progressing  Goal: Patient-Specific Goal (Individualized)  Outcome: Progressing  Goal: Absence of Hospital-Acquired Illness or Injury  Outcome: Progressing  Goal: Optimal Comfort and Wellbeing  Outcome: Progressing  Goal: Readiness for Transition of Care  Outcome: Progressing     Problem: Delirium  Goal: Optimal Coping  Outcome: Progressing  Goal: Improved Behavioral Control  Outcome: Progressing  Goal: Improved Attention and Thought Clarity  Outcome: Progressing  Goal: Improved Sleep  Outcome: Progressing     Problem: Fall Injury Risk  Goal: Absence of Fall and Fall-Related Injury  Outcome: Progressing     Problem: Infection  Goal: Absence of Infection Signs and Symptoms  Outcome: Progressing     Problem: Skin Injury Risk Increased  Goal: Skin Health and Integrity  Outcome: Progressing    Uneventful night;mood/behavior improved;saw Taisha vargas from [facility] last night & spoke with her;s/o remains at bedside;meds administered as ordered per md

## 2025-06-29 NOTE — NURSING
"Taisha with [facility] beamed in to see for follow up today for psych;much better mood today;stated he was "grouchy yesterday"  "

## 2025-06-30 LAB
ALBUMIN SERPL-MCNC: 2.4 G/DL (ref 3.4–5)
ALBUMIN/GLOB SERPL: 0.6 RATIO
ALP SERPL-CCNC: 125 UNIT/L (ref 50–144)
ALT SERPL-CCNC: 42 UNIT/L (ref 1–45)
AMMONIA PLAS-MSCNC: 84 UMOL/L (ref 11–32)
ANION GAP SERPL CALC-SCNC: -2 MEQ/L (ref 2–13)
AST SERPL-CCNC: 77 UNIT/L (ref 17–59)
BASOPHILS # BLD AUTO: 0.03 X10(3)/MCL (ref 0.01–0.08)
BASOPHILS NFR BLD AUTO: 0.7 % (ref 0.1–1.2)
BILIRUB SERPL-MCNC: 2.4 MG/DL (ref 0–1)
BUN SERPL-MCNC: 18 MG/DL (ref 7–20)
CALCIUM SERPL-MCNC: 9.2 MG/DL (ref 8.4–10.2)
CHLORIDE SERPL-SCNC: 113 MMOL/L (ref 98–110)
CO2 SERPL-SCNC: 21 MMOL/L (ref 21–32)
CREAT SERPL-MCNC: 1.24 MG/DL (ref 0.66–1.25)
CREAT/UREA NIT SERPL: 15 (ref 12–20)
EOSINOPHIL # BLD AUTO: 0.1 X10(3)/MCL (ref 0.04–0.54)
EOSINOPHIL NFR BLD AUTO: 2.5 % (ref 0.7–7)
ERYTHROCYTE [DISTWIDTH] IN BLOOD BY AUTOMATED COUNT: 16.9 %
FERRITIN SERPL-MCNC: 62.5 NG/ML (ref 17.9–464)
FOLATE SERPL-MCNC: 6.1 NG/ML (ref 2.8–20)
GFR SERPLBLD CREATININE-BSD FMLA CKD-EPI: 67 ML/MIN/1.73/M2
GLOBULIN SER-MCNC: 4.2 GM/DL (ref 2–3.9)
GLUCOSE SERPL-MCNC: 102 MG/DL (ref 70–115)
HCT VFR BLD AUTO: 24.5 % (ref 36–52)
HGB BLD-MCNC: 7.9 G/DL (ref 13–18)
IMM GRANULOCYTES # BLD AUTO: 0.01 X10(3)/MCL (ref 0–0.03)
IMM GRANULOCYTES NFR BLD AUTO: 0.2 % (ref 0–0.5)
IRON SATN MFR SERPL: 25 % (ref 20–50)
IRON SERPL-MCNC: 55 UG/DL (ref 65–175)
LYMPHOCYTES # BLD AUTO: 0.85 X10(3)/MCL (ref 1.32–3.57)
LYMPHOCYTES NFR BLD AUTO: 21 % (ref 20–55)
MAGNESIUM SERPL-MCNC: 1.9 MG/DL (ref 1.8–2.4)
MCH RBC QN AUTO: 30.4 PG (ref 27–34)
MCHC RBC AUTO-ENTMCNC: 32.2 G/DL (ref 31–37)
MCV RBC AUTO: 94.2 FL (ref 79–99)
MONOCYTES # BLD AUTO: 0.63 X10(3)/MCL (ref 0.3–0.82)
MONOCYTES NFR BLD AUTO: 15.6 % (ref 4.7–12.5)
NEUTROPHILS # BLD AUTO: 2.42 X10(3)/MCL (ref 1.78–5.38)
NEUTROPHILS NFR BLD AUTO: 60 % (ref 37–73)
PLATELET # BLD AUTO: 46 X10(3)/MCL (ref 140–371)
PMV BLD AUTO: ABNORMAL FL
POTASSIUM SERPL-SCNC: 4.9 MMOL/L (ref 3.5–5.1)
PROT SERPL-MCNC: 6.6 GM/DL (ref 6.3–8.2)
RBC # BLD AUTO: 2.6 X10(6)/MCL (ref 4–6)
SODIUM SERPL-SCNC: 132 MMOL/L (ref 136–145)
TIBC SERPL-MCNC: 164 UG/DL (ref 60–240)
TIBC SERPL-MCNC: 219 UG/DL (ref 250–450)
TRANSFERRIN SERPL-MCNC: 195 MG/DL (ref 174–364)
VIT B12 SERPL-MCNC: >1000 PG/ML (ref 211–946)
WBC # BLD AUTO: 4.04 X10(3)/MCL (ref 4–11.5)

## 2025-06-30 PROCEDURE — 83735 ASSAY OF MAGNESIUM: CPT | Performed by: FAMILY MEDICINE

## 2025-06-30 PROCEDURE — 36415 COLL VENOUS BLD VENIPUNCTURE: CPT | Performed by: FAMILY MEDICINE

## 2025-06-30 PROCEDURE — 82607 VITAMIN B-12: CPT | Performed by: FAMILY MEDICINE

## 2025-06-30 PROCEDURE — 82728 ASSAY OF FERRITIN: CPT | Performed by: FAMILY MEDICINE

## 2025-06-30 PROCEDURE — 80053 COMPREHEN METABOLIC PANEL: CPT | Performed by: FAMILY MEDICINE

## 2025-06-30 PROCEDURE — 25000003 PHARM REV CODE 250: Performed by: FAMILY MEDICINE

## 2025-06-30 PROCEDURE — 82140 ASSAY OF AMMONIA: CPT | Performed by: FAMILY MEDICINE

## 2025-06-30 PROCEDURE — 83540 ASSAY OF IRON: CPT | Performed by: FAMILY MEDICINE

## 2025-06-30 PROCEDURE — 94761 N-INVAS EAR/PLS OXIMETRY MLT: CPT

## 2025-06-30 PROCEDURE — 82746 ASSAY OF FOLIC ACID SERUM: CPT | Performed by: FAMILY MEDICINE

## 2025-06-30 PROCEDURE — 85025 COMPLETE CBC W/AUTO DIFF WBC: CPT | Performed by: FAMILY MEDICINE

## 2025-06-30 PROCEDURE — 11000001 HC ACUTE MED/SURG PRIVATE ROOM

## 2025-06-30 RX ORDER — QUETIAPINE FUMARATE 100 MG/1
100 TABLET, FILM COATED ORAL NIGHTLY
Status: DISCONTINUED | OUTPATIENT
Start: 2025-06-30 | End: 2025-07-03 | Stop reason: HOSPADM

## 2025-06-30 RX ORDER — QUETIAPINE FUMARATE 25 MG/1
50 TABLET, FILM COATED ORAL DAILY
Status: DISCONTINUED | OUTPATIENT
Start: 2025-07-01 | End: 2025-07-03 | Stop reason: HOSPADM

## 2025-06-30 RX ADMIN — LOSARTAN POTASSIUM 50 MG: 50 TABLET, FILM COATED ORAL at 08:06

## 2025-06-30 RX ADMIN — CHLORPROMAZINE HYDROCHLORIDE 25 MG: 25 TABLET, FILM COATED ORAL at 03:06

## 2025-06-30 RX ADMIN — PROPRANOLOL HYDROCHLORIDE 20 MG: 20 TABLET ORAL at 08:06

## 2025-06-30 RX ADMIN — Medication 800 MG: at 09:06

## 2025-06-30 RX ADMIN — FAMOTIDINE 20 MG: 20 TABLET, FILM COATED ORAL at 09:06

## 2025-06-30 RX ADMIN — LACTULOSE 20 G: 20 SOLUTION ORAL at 11:06

## 2025-06-30 RX ADMIN — FAMOTIDINE 20 MG: 20 TABLET, FILM COATED ORAL at 08:06

## 2025-06-30 RX ADMIN — POTASSIUM CHLORIDE 40 MEQ: 1500 TABLET, EXTENDED RELEASE ORAL at 08:06

## 2025-06-30 RX ADMIN — THIAMINE HCL TAB 100 MG 100 MG: 100 TAB at 09:06

## 2025-06-30 RX ADMIN — QUETIAPINE FUMARATE 100 MG: 100 TABLET ORAL at 08:06

## 2025-06-30 RX ADMIN — LACTULOSE 20 G: 20 SOLUTION ORAL at 04:06

## 2025-06-30 RX ADMIN — BACLOFEN 5 MG: 5 TABLET ORAL at 03:06

## 2025-06-30 RX ADMIN — Medication 800 MG: at 08:06

## 2025-06-30 RX ADMIN — QUETIAPINE FUMARATE 100 MG: 100 TABLET ORAL at 09:06

## 2025-06-30 RX ADMIN — THIAMINE HCL TAB 100 MG 100 MG: 100 TAB at 04:06

## 2025-06-30 RX ADMIN — LACTULOSE 20 G: 20 SOLUTION ORAL at 05:06

## 2025-06-30 NOTE — PLAN OF CARE
06/30/25 1203   Discharge Reassessment   Assessment Type Discharge Planning Reassessment   Did the patient's condition or plan change since previous assessment? Yes   Discharge Plan discussed with: Patient;Parent(s)   Name(s) and Number(s) Niyah Townsend- [phone]   Communicated ARIANE with patient/caregiver Yes   Discharge Plan A Rehab   Discharge Plan B Skilled Nursing Facility   DME Needed Upon Discharge  none   Transition of Care Barriers Does not adhere to care plan;Social;Substance Abuse   Why the patient remains in the hospital Requires continued medical care   Post-Acute Status   Post-Acute Authorization Placement   Post-Acute Placement Status Pending payor review/awaiting authorization (if required)   Discharge Delays (!) Post-Acute Set-up

## 2025-06-30 NOTE — PROGRESS NOTES
Hospital Medicine  Progress Note    Patient Name: Yong Townsend  MRN: [id number]  Status: IP- Inpatient   Admission Date: 6/16/2025  Length of Stay: 14  Date of Service: 06/30/2025       CC: hospital follow-up for ams       SUBJECTIVE   60 years old male patient with unknown PMH but taking Trazodone, Baclofen, and Tizanidine was brought to the emergency room to be evaluated for unresponsiveness. Patient is currently pleasantly confused, spontaneously moving extremities in the bed but not following command verbally. Entire history is obtained from emergency and nursing report. I did try to reach to patients mother but unable to reach. As per the report patient was at Health system this morning and went for the washroom around 9. His mother found him unresponsive in the washroom around 10:30 but for some reason she did not call 911 until 1:30 PM. She thought that patient will be responsive, but he did not so 911 was eventually called. On arrival CT scan of the head, CTA of the head and neck and MRI of the brain were obtained. Neurologist was also consulted. MRI of the brain is negative for any acute infarct, CT head is also negative for any acute findings as well as CT of the head and neck. Initial workup showed ammonia level of 166, hemoglobin 9.3, platelet count 75, sodium 141, potassium 3.9, and dimer of 0.85. Hie did get CTA head and neck so CTA chest was not obtained. On arrival patient's GCS level was only 8 and was only responsive to painful stimuli. During the course of the emergency room he has become more awake and GCS score improved. Before MRI patient was agitated so he required IV Versed and Lorazepam. Medicine team is asked to hospitalize the patient for further care. No fever or diarrhea or reported chest pain or SOB or tingling or numbness or slurred speech or focal weakness prior to unresponsiveness.      06/17/2025 pt admitted with hepatic encephalopathy, h/o significant ETOH use, drinks beer and whisky  according to his mom.  Work up in ER essentially negative.    Pt apparently has 2 epic charts with different MRN, [id number] and this chart [id number] in the original chart he has multiple admissions to [facility] in [place] for alcoholic cirrhosis, h/o esophageal banding and varicees back to 2018, ETOH use, Cocaine +, Marijuana + , Has reportedly seen Dr. Mon in the past, saw DR. Rodriguez in 2022 for varicees after being transferred to [facility] from [facility].  Pt with h/o Thrombocytopenia last 5/2025 was 23, received platelts in [place] at that time.  Mild Curly and anemia requiring transfusions in the past.  His ammonia was 150+ on admit here.  06/18/2025 pt more awake this am, improving mental staus, more sleepy this am  06/19/2025 pt with increased aggitation started mid morning yesterday back on iv ativan needed some precedex overnight due to combative behaviors, currently resting off precedex received iv ativan, on high dose thiamine and MVI iv bannana bags  06/20/2025 pt still confused ativan giving 2mg iv q 2 hrs, pt not taking po ativan  3/21/2025 started taking po meds today with breakfast  Will try adding po taper today   3/22/2025 more awake today but confused  Tolerating po intake   Held ativan this AM   6/23/2025 more responsive today  But confused ammonia level back up today to 74  Will increase lactulose   6/24/2025 ammonia down to 30  Remains pretty weak  But mental status much improved   6/25/2025  Feels better today  Did well with PT   6/26/2025  Ammonia back up today to 89  Worked with PT but very weak  Interested in going to [facility] rehab   6/27/2025  Ammonia level < 9 today  Agitated overnight     06/28/2025 pt still very confused about his dates and times, oriented to self and place somewhat, intitially thought he was in [place], able to be redirected seems to confabulate a lot.  He has a very extensive vocabulary and is very intelligent but very limited on details and gets aggitated when  challenged on his details especially related to recent behaviors in hospital.  06/29/2025 confusion is a bit less, still confabulates and very poor short term memory, less aggitated  06/30/2025 pt very sleepy in am after seroquel, still combative at times, throws things at staff this am, now sleeping  Today: Patient seen and examined at bedside, and chart reviewed.       MEDICATIONS   Scheduled   cholecalciferol (vitamin D3)  50,000 Units Oral Weekly    famotidine  20 mg Oral BID    lactulose  20 g Oral Q6H    losartan  50 mg Oral Daily    magnesium oxide  800 mg Oral BID    potassium chloride  40 mEq Oral Daily    propranoloL  20 mg Oral BID    QUEtiapine  100 mg Oral BID    thiamine  100 mg Oral Q8H     Continuous Infusions    ROS limited due to mental status    PHYSICAL EXAM   VITALS: T 97.6 °F (36.4 °C)   BP 99/62   P 83   RR 20   O2 100 %    GENERAL: Awake and in NAD  LUNGS: CTA B/L  CVS: Normal rate  GI/: Soft, NT, bowel sounds positive.  EXTREMITIES: No peripheral edema  NEURO: AAOx3  PSYCH: Cooperative      LABS   CBC  Recent Labs     06/29/25  0600 06/30/25  0459   WBC 3.40* 4.04   RBC 2.49* 2.60*   HGB 7.7* 7.9*   HCT 22.7* 24.5*   MCV 91.2 94.2   MCH 30.9 30.4   MCHC 33.9 32.2   RDW 16.8 16.9   PLT 40* 46*     CHEM  Recent Labs     06/29/25  0600 06/30/25  0459    132*   K 4.0 4.9   CO2 23 21   BUN 17 18   CREATININE 1.16 1.24   CALCIUM 9.2 9.2   MG 1.70* 1.90   ALBUMIN 2.4* 2.4*   GLOBULIN 4.0* 4.2*   ALKPHOS 180* 125   ALT 39 42   AST 76* 77*   BILITOT 1.9* 2.4*   FERRITIN  --  62.5   IRON  --  55*   TIBC  --  219*         MICROBIOLOGY     Microbiology Results (last 7 days)       Procedure Component Value Units Date/Time    Blood Culture [[id number]] Collected: 06/22/25 2150    Order Status: Completed Specimen: Blood from Arm Updated: 06/28/25 0715     Blood Culture No Growth at 5 days    Blood Culture [[id number]] Collected: 06/22/25 2150    Order Status: Completed Specimen: Blood from Arm  Updated: 06/28/25 0715     Blood Culture No Growth at 5 days              DIAGNOSTICS   US Abdomen Limited  Narrative: EXAMINATION:  STUDY: US ABDOMEN LIMITED    CLINICAL HISTORY AND TECHNIQUE:  Hepatic encephalopathy    COMPARISON:  None    FINDINGS:  The quality of this examination is very limited due to lack of patient cooperation and difficulty in proper positioning and control of respiration.    Liver:   The liver appears mildly atrophic with a slightly heterogeneous echogenic appearance but no worrisome focal parenchymal abnormalities are dilated biliary radicles.  There is adequate antegrade flow within the portal vein with no evidence of portal venous shunting.    Gallbladder/biliary system: The gallbladder is adequately distended with no evidence of cholelithiasis, thickening of the gallbladder wall or pericholecystic edema. The patient was not significantly tender while scanning over the gallbladder. The common bile duct, where visualized, is not dilated nor do I see evidence of intraductal stones.    Miscellaneous: N/a  Impression: 1. Less than optimal examination due to lack of patient cooperation, difficulty and proper positioning and control of respiration.  2. The liver appears mildly atrophic with a slightly heterogeneous echogenic appearance.  These findings are nonspecific but can be seen with cirrhosis and clinical correlation is recommended.    Electronically signed by: Nasir Aden  Date:    06/17/2025  Time:    07:32    Assessment and Plan      Syncope      Hepatic encephalopathy also may have some ETOH induced encephalopathy  Cont lactulose q 6 hr for now   Ammonia level up to 58, continue lacrtulose  Repeat am level   Telepsych consulted has been following  Decrease am seroquel to 50mg, continue 100mg at hs     Likely polypharmacy due to Baclofen,Tizanidine and Trazodone as well these meds are being held since admit     Thrombocytopenia due to cirrhosis  The likely etiology of thrombocytopenia  "is liver disease. The patients 3 most recent labs are listed below.          Recent Labs     06/18/25  0348 06/19/25  0440   PLT 46* 36*      Plan  - Will transfuse if platelet count is <50k (if undergoing surgical procedure or have active bleeding).  -        H/o GI bleed and esophageal varicees s/p banding 2018     Alcoholic Cirrhosis continue ativan iv, precedex if needed  No seizures or h/o seizures per family  Continue thimine and MVI  Po ativan taper     Hypomagnesemia  Patient with noted electrolyte deficiencies with Hypo-Magnesemia. Latest electrolytes have been reviewed and values are   Magnesium Level   Date Value Ref Range Status   06/30/2025 1.90 1.80 - 2.40 mg/dL Final   . Will continue to monitor electrolytes closely. Will replace the affected electrolytes and repeat labs to be done after interventions completed.  Increase po to 800mg bid  Give 2gm iv prn    Patient has Abnormal Magnesium: hypomagnesemia. Will continue to monitor electrolytes closely. Will replace the affected electrolytes and repeat labs to be done after interventions completed. The patient's magnesium results have been reviewed and are listed below.  No results for input(s): "MG" in the last 24 hours.        Anemia  Anemia is likely due to chronic blood loss and Iron deficiency. Most recent hemoglobin and hematocrit are listed below.          Recent Labs     06/18/25  0348 06/19/25  0440   HGB 8.3* 7.5*   HCT 25.2* 23.0*      Plan  - Monitor serial CBC: Daily  - Transfuse PRBC if patient becomes hemodynamically unstable, symptomatic or H/H drops below 7/21.  - Patient has not received any PRBC transfusions to date  - Patient's anemia is currently stable  -  check irone and b12, folate levels  Known h/o esophageal varicees  Occult +        HTN  Patients blood pressure range in the last 24 hours was: BP  Min: 68/46  Max: 200/115.The patient's inpatient anti-hypertensive regimen is listed below:  Current Antihypertensives  , 2 times " daily, Oral  propranoloL tablet 20 mg, 2 times daily, Oral  labetalol 20 mg/4 mL (5 mg/mL) IV syring, Every 4 hours PRN, Intravenous  hydrALAZINE injection 10 mg, Every 6 hours PRN, Intravenous  cloNIDine tablet 0.2 mg, Every 6 hours PRN, Oral  losartan tablet 50 mg, Daily, Oral     Plan  -Patients blood pressure range in the last 24 hours was: BP  Min: 68/46  Max: 200/115.The patient's inpatient anti-hypertensive regimen is listed below:  Current Antihypertensives  , 2 times daily, Oral  propranoloL tablet 20 mg, 2 times daily, Oral  labetalol 20 mg/4 mL (5 mg/mL) IV syring, Every 4 hours PRN, Intravenous  hydrALAZINE injection 10 mg, Every 6 hours PRN, Intravenous  cloNIDine tablet 0.2 mg, Every 6 hours PRN, Oral  losartan tablet 50 mg, Daily, Oral    Plan  - BP is controlled, no changes needed to their regimen  -               Fall precautions   Seizure precautions      Continue Lactulose p   Rechek ammonia level in am and other labs  Cont PT  Case mgmt- rehab consult   Gentle IV fluid   UDS neg     Cont PT                   Taisha Cesar MD  [facility]

## 2025-06-30 NOTE — PROGRESS NOTES
Inpatient Nutrition Follow-Up    Admit Date: 6/16/2025   Total duration of encounter: 14 days   Patient Age: 60 y.o.    Nutrition Recommendation/Prescription     Continue Regular diet as tolerated.    Change Boost Plus BID to Boost Very High Calorie- Any Flavor BID providing (530 kcal, 22 gm pro each) to aid in nutritional intake.     Continue Vit. D3 and Thiamine supplementation as medically appropriate.     Recommend consider appetite stimulant as medically appropriate due to poor PO with AMS.     Continue to encourage PO intake, assist with feeds, and honor patient preferences.    Dietitian will monitor Electrolytes, Food and Beverage Intake, Mental Status, Weight, and Weight Change and adjust MNT as needed.     Monitoring & Evaluation       Reason Seen: length of stay    Nutrition Risk/Follow-Up: low (follow-up in 5-7 days)    Next Date to be Seen by RD: 07/04/25  Please consult if re-assessment needed sooner.      Nutrition Assessment     Malnutrition Assessment/Nutrition-Focused Physical Exam       Malnutrition Level: other (see comments) (Unable to assess) (06/24/25 0057)  Energy Intake (Malnutrition): less than or equal to 50% for greater than or equal to 5 days (06/24/25 0057)  Weight Loss (Malnutrition): other (see comments) (Unable to assess) (06/24/25 0057)                                         Fluid Accumulation (Malnutrition): other (see comments) (Not present) (06/24/25 0057)        A minimum of two characteristics is recommended for diagnosis of either severe or non-severe malnutrition.    Chart Review    Malnutrition Screening Tool Results   Have you recently lost weight without trying?: Unsure  Have you been eating poorly because of a decreased appetite?: No   MST Score: 2     Home Nutrition Screen Results   Home Tube Feeding: No   Home Parenteral Nutrition: No     Diagnosis:  Syncope   Hepatic encephalopathy   Likely polypharmacy  Thrombocytopenia due to cirrhosis  H/o GI bleed and esophageal  varicees s/p banding 2018  Alcoholic Cirrhosis  Hypomagnesemia  Anemia  HTN    History reviewed. No pertinent past medical history.  History reviewed. No pertinent surgical history.    Scheduled Medications:  cholecalciferol (vitamin D3), 50,000 Units, Weekly  famotidine, 20 mg, BID  lactulose, 20 g, Q6H  losartan, 50 mg, Daily  magnesium oxide, 800 mg, BID  potassium chloride, 40 mEq, Daily  propranoloL, 20 mg, BID  QUEtiapine, 100 mg, Nightly  [START ON 7/1/2025] QUEtiapine, 50 mg, Daily  thiamine, 100 mg, Q8H    Continuous Infusions:   PRN Medications:   Current Facility-Administered Medications:     baclofen, 5 mg, Oral, TID PRN    chlorproMAZINE, 25 mg, Oral, TID PRN    cloNIDine, 0.2 mg, Oral, Q6H PRN    dextrose 50%, 12.5 g, Intravenous, PRN    dextrose 50%, 25 g, Intravenous, PRN    glucagon (human recombinant), 1 mg, Intramuscular, PRN    glucose, 16 g, Oral, PRN    glucose, 24 g, Oral, PRN    haloperidol lactate, 2.5 mg, Intramuscular, Q6H PRN    hydrALAZINE, 10 mg, Intravenous, Q6H PRN    ibuprofen, 600 mg, Oral, Q6H PRN    labetaloL, 20 mg, Intravenous, Q4H PRN    lorazepam, 2 mg, Intravenous, Q2H PRN    naloxone, 0.02 mg, Intravenous, PRN    ondansetron, 4 mg, Intravenous, Q8H PRN    prochlorperazine, 5 mg, Intravenous, Q6H PRN    sodium chloride 0.9%, 10 mL, Intravenous, PRN    sodium chloride 0.9%, 10 mL, Intravenous, Q12H PRN    ziprasidone, 20 mg, Intramuscular, Q12H PRN    Calorie Containing IV Medications: no significant kcals from medications at this time    Nutrition-Related Labs:   Recent Labs   Lab 06/24/25  0916 06/25/25  0457 06/26/25  0759 06/26/25  1017 06/27/25  0448 06/27/25  1052 06/28/25  0502 06/29/25  0600 06/30/25  0459   NA  --  135* 135*  --  133*  --  136 139 132*   K  --  3.8 4.3  --  3.8  --  4.1 4.0 4.9   CALCIUM  --  8.9 9.3  --  9.5  --  9.2 9.2 9.2   MG  --  1.50* 1.80  --  2.00  --  2.00 1.70* 1.90   CO2  --  21 20*  --  21  --  21 23 21   BUN  --  12 13  --  14  --  17 17  18   CREATININE  --  0.87 0.86  --  1.15  --  1.61* 1.16 1.24   EGFRNORACEVR  --  >90 >90  --  73  --  49 72 67   BILITOT  --  2.7* 2.7*  --  2.2*  --  2.0* 1.9* 2.4*   ALKPHOS  --  103 111  --  99  --  95 180* 125   ALT  --  26 33  --  29  --  32 39 42   AST  --  61* 78*  --  65*  --  62* 76* 77*   ALBUMIN  --  2.3* 2.7*  --  2.3*  --  2.3* 2.4* 2.4*   AMMONIA 30.0  --   --  89.0*  --  <9.0*  --  58.0* 84.0*   WBC  --  5.49 7.12  --  3.74*  --  4.15 3.40* 4.04   HGB  --  7.9* 8.3*  --  7.6*  --  7.3* 7.7* 7.9*   HCT  --  23.9* 24.7*  --  22.0*  --  22.1* 22.7* 24.5*     CrCl:    Lab Value: None    Date: 06/23  CrCl:    Lab Value: 69.5    Date: 6/30      Nutrition Orders:  Diet Adult Regular Standard Tray  Dietary nutrition supplements BID; Boost Plus Nutritional Drink - Very Vanilla    Appetite/Oral Intake: poor/0-25% of meals  Factors Affecting Nutritional Intake: decreased appetite  Social Needs Impacting Access to Food: unable to assess at this time; will attempt on follow-up  Food Insecurity: No Food Insecurity (6/17/2025)    Hunger Vital Sign     Worried About Running Out of Food in the Last Year: Never true     Ran Out of Food in the Last Year: Never true     Food/Mosque/Cultural Preferences: Food Preferences: N/a / Spiritual, Cultural Beliefs, Mosque Practices, Values that Affect Care: no  Food Allergies: Review of patient's allergies indicates:  No Known Allergies         Wound(s):       Overview/Hospital Course:    (06/23) RD ASSESSED D/T LOS, 60 Y.O MALE, REGULAR DIET, POOR APT, CONSUMED 0-50% X4 MEALS (AVG 21%), REQ ASSIST W/ MEALS PER FXN SCREEN, NUTR SCORE: 3, BRDN SCORE: 20, ABD S/NT/ND/+BS, LBM TODAY, I/O: 1560/595, PER EMR    (6/30): Patient asleep, unable to wake at this time. Patient has averaged 28%-10 meals and only recorded ONS is 1/2 on 6/24. Per EMR patient is confused, very poor short term memory and nurse repots plans to discharge to NH. GI: WDL except GI symptoms, INCONTINENT, and  NON DISTENDED/LBM-, : WDL except VOIDING abilities and SPONTANEOUS, FUNCTIONAL SCREEN:Eatin - independent, Nutrition: 4-->excellent,Mukul Score: 19, NO EDEMA NOTED, 24 HR I/O: 840/0.      Anthropometrics    Height: 6' (182.9 cm) Height Method: Stated  Last Weight: 77.9 kg (171 lb 11.2 oz) (25 1610) Weight Method: Bed Scale  BMI (Calculated): 23.3  BMI Classification: not applicable      No height listed  Ideal Body Weight (IBW), Male: 178 lb     % Ideal Body Weight, Male (lb): 96.46 %                          Usual Weight Provided By: EMR weight history    Wt Readings from Last 5 Encounters:   25 77.9 kg (171 lb 11.2 oz)     Weight Change(s) Since Admission:  Admit Weight: 81.2 kg (179 lb) (25 1312)      Estimated Needs    Weight Used For Calorie Calculations: 78 kg (171 lb 15.3 oz)  Energy Calorie Requirements (kcal): 1950 - 2340 kcal (25 - 30 kcal/kg cbw)  Energy Need Method: Kcal/kg  Weight Used For Protein Calculations: 78 kg (171 lb 15.3 oz)  Protein Requirements: 62 - 78 gm pro (0.8 - 1.0 gm pro/kg cbw)  Fluid Requirements (mL): 1950 - 2340 mL (1 mL/kcal)        Enteral Nutrition    Patient not receiving enteral nutrition at this time.    Parenteral Nutrition    Patient not receiving parenteral nutrition support at this time.    Evaluation of Received Nutrient Intake    Calories: not meeting estimated needs  Protein: not meeting estimated needs    Patient Education    Not applicable.         Nutrition Diagnosis     PES: Inadequate protein energy intake related to Acute illness as evidenced by Intake <50% estimated needs.  Continues    Nutrition Interventions     Intervention(s): general/healthful diet, commercial beverage, and multivitamin/mineral supplement therapy    Goal: Meet Greater than 75% of nutritional needs by discharge. (goal progressing)    Goal: Maintain weight throughout hospitalization. (goal progressing)    Nutrition Goals & Monitoring     Dietitian will monitor:  food and beverage intake, weight, and weight change  Discharge planning:    too early to determine; pending clinical course  Next Date to be Seen by RD: 07/04/25  Please consult if re-assessment needed sooner.

## 2025-06-30 NOTE — PT/OT/SLP PROGRESS
Name: Yong Townsend    : 1965 (60 y.o.)  MRN: [id number]           Patient Not Seen      Patient unable to be seen at this time secondary to: continues to be sleeping soundly. Did not wake up or touch any of his lunch. Attempted to arouse but unsuccessful, as pt continues to snore. Spoke with RN who reports they may need to adjust some of his mrnign meds to increase alertness. She is aware of missed session with therapy today and will notify Md of increased lethargy of patient. Will return tomorrow to attempt treatment. .

## 2025-06-30 NOTE — PLAN OF CARE
Problem: Adult Inpatient Plan of Care  Goal: Plan of Care Review  Outcome: Progressing  Goal: Patient-Specific Goal (Individualized)  Outcome: Progressing  Goal: Absence of Hospital-Acquired Illness or Injury  Outcome: Progressing  Goal: Optimal Comfort and Wellbeing  Outcome: Progressing  Goal: Readiness for Transition of Care  Outcome: Progressing     Problem: Delirium  Goal: Optimal Coping  Outcome: Progressing  Goal: Improved Behavioral Control  Outcome: Progressing  Goal: Improved Attention and Thought Clarity  Outcome: Progressing  Goal: Improved Sleep  Outcome: Progressing     Problem: Fall Injury Risk  Goal: Absence of Fall and Fall-Related Injury  Outcome: Progressing     Problem: Infection  Goal: Absence of Infection Signs and Symptoms  Outcome: Progressing     Problem: Skin Injury Risk Increased  Goal: Skin Health and Integrity  Outcome: Progressing    Remains confused most of the time but significantly less aggressive than he had been;uneventful night;remains with hiccups but better than yesterday;eating well;meds administered as ordered per md

## 2025-06-30 NOTE — PT/OT/SLP PROGRESS
Name: Yong Townsend    : 1965 (60 y.o.)  MRN: [id number]           Patient Not Seen      Patient unable to be seen at this time secondary to: sleeping soundly. Unable to arouse with conversation and pt snoring. Not able to wake enough to engage with therapist.

## 2025-07-01 PROBLEM — E87.1 HYPONATREMIA: Status: ACTIVE | Noted: 2025-07-01

## 2025-07-01 LAB
ABO + RH BLD: NORMAL
ABORH RETYPE: NORMAL
ALBUMIN SERPL-MCNC: 2.2 G/DL (ref 3.4–5)
ALBUMIN/GLOB SERPL: 0.6 RATIO
ALP SERPL-CCNC: 115 UNIT/L (ref 50–144)
ALT SERPL-CCNC: 35 UNIT/L (ref 1–45)
ANION GAP SERPL CALC-SCNC: 0 MEQ/L (ref 2–13)
ANISOCYTOSIS BLD QL SMEAR: ABNORMAL
AST SERPL-CCNC: 67 UNIT/L (ref 17–59)
BASOPHILS # BLD AUTO: 0.01 X10(3)/MCL (ref 0.01–0.08)
BASOPHILS NFR BLD AUTO: 0.3 % (ref 0.1–1.2)
BILIRUB SERPL-MCNC: 1.8 MG/DL (ref 0–1)
BLD PROD TYP BPU: NORMAL
BLOOD UNIT EXPIRATION DATE: NORMAL
BLOOD UNIT TYPE CODE: 6200
BUN SERPL-MCNC: 19 MG/DL (ref 7–20)
CALCIUM SERPL-MCNC: 9 MG/DL (ref 8.4–10.2)
CHLORIDE SERPL-SCNC: 114 MMOL/L (ref 98–110)
CO2 SERPL-SCNC: 21 MMOL/L (ref 21–32)
CREAT SERPL-MCNC: 1.1 MG/DL (ref 0.66–1.25)
CREAT/UREA NIT SERPL: 17 (ref 12–20)
CROSSMATCH INTERPRETATION: NORMAL
DISPENSE STATUS: NORMAL
ELLIPTOCYTOSIS (OHS): ABNORMAL
EOSINOPHIL # BLD AUTO: 0.09 X10(3)/MCL (ref 0.04–0.54)
EOSINOPHIL NFR BLD AUTO: 3.1 % (ref 0.7–7)
ERYTHROCYTE [DISTWIDTH] IN BLOOD BY AUTOMATED COUNT: 16.6 %
GFR SERPLBLD CREATININE-BSD FMLA CKD-EPI: 77 ML/MIN/1.73/M2
GLOBULIN SER-MCNC: 3.7 GM/DL (ref 2–3.9)
GLUCOSE SERPL-MCNC: 101 MG/DL (ref 70–115)
GROUP & RH: NORMAL
HCT VFR BLD AUTO: 21 % (ref 36–52)
HGB BLD-MCNC: 6.9 G/DL (ref 13–18)
IMM GRANULOCYTES # BLD AUTO: 0.01 X10(3)/MCL (ref 0–0.03)
IMM GRANULOCYTES NFR BLD AUTO: 0.3 % (ref 0–0.5)
INDIRECT COOMBS: NORMAL
LYMPHOCYTES # BLD AUTO: 0.84 X10(3)/MCL (ref 1.32–3.57)
LYMPHOCYTES NFR BLD AUTO: 29.1 % (ref 20–55)
MCH RBC QN AUTO: 30.5 PG (ref 27–34)
MCHC RBC AUTO-ENTMCNC: 32.9 G/DL (ref 31–37)
MCV RBC AUTO: 92.9 FL (ref 79–99)
MONOCYTES # BLD AUTO: 0.62 X10(3)/MCL (ref 0.3–0.82)
MONOCYTES NFR BLD AUTO: 21.5 % (ref 4.7–12.5)
NEUTROPHILS # BLD AUTO: 1.32 X10(3)/MCL (ref 1.78–5.38)
NEUTROPHILS NFR BLD AUTO: 45.7 % (ref 37–73)
PLATELET # BLD AUTO: 39 X10(3)/MCL (ref 140–371)
PLATELET # BLD EST: ABNORMAL 10*3/UL
PMV BLD AUTO: ABNORMAL FL
POIKILOCYTOSIS BLD QL SMEAR: ABNORMAL
POTASSIUM SERPL-SCNC: 4.3 MMOL/L (ref 3.5–5.1)
PROT SERPL-MCNC: 5.9 GM/DL (ref 6.3–8.2)
RBC # BLD AUTO: 2.26 X10(6)/MCL (ref 4–6)
RBC MORPH BLD: ABNORMAL
SODIUM SERPL-SCNC: 135 MMOL/L (ref 136–145)
SPECIMEN OUTDATE: NORMAL
UNIT NUMBER: NORMAL
WBC # BLD AUTO: 2.89 X10(3)/MCL (ref 4–11.5)

## 2025-07-01 PROCEDURE — 94761 N-INVAS EAR/PLS OXIMETRY MLT: CPT

## 2025-07-01 PROCEDURE — 25000003 PHARM REV CODE 250: Performed by: FAMILY MEDICINE

## 2025-07-01 PROCEDURE — 36415 COLL VENOUS BLD VENIPUNCTURE: CPT | Performed by: FAMILY MEDICINE

## 2025-07-01 PROCEDURE — 97116 GAIT TRAINING THERAPY: CPT

## 2025-07-01 PROCEDURE — 85025 COMPLETE CBC W/AUTO DIFF WBC: CPT | Performed by: FAMILY MEDICINE

## 2025-07-01 PROCEDURE — 11000001 HC ACUTE MED/SURG PRIVATE ROOM

## 2025-07-01 PROCEDURE — 86850 RBC ANTIBODY SCREEN: CPT | Performed by: FAMILY MEDICINE

## 2025-07-01 PROCEDURE — P9016 RBC LEUKOCYTES REDUCED: HCPCS | Performed by: FAMILY MEDICINE

## 2025-07-01 PROCEDURE — 36430 TRANSFUSION BLD/BLD COMPNT: CPT

## 2025-07-01 PROCEDURE — 80053 COMPREHEN METABOLIC PANEL: CPT | Performed by: FAMILY MEDICINE

## 2025-07-01 PROCEDURE — 63600175 PHARM REV CODE 636 W HCPCS: Performed by: FAMILY MEDICINE

## 2025-07-01 PROCEDURE — 86923 COMPATIBILITY TEST ELECTRIC: CPT | Performed by: FAMILY MEDICINE

## 2025-07-01 RX ORDER — SODIUM CHLORIDE 9 MG/ML
INJECTION, SOLUTION INTRAVENOUS
Status: DISCONTINUED | OUTPATIENT
Start: 2025-07-01 | End: 2025-07-03 | Stop reason: HOSPADM

## 2025-07-01 RX ORDER — HYDROCODONE BITARTRATE AND ACETAMINOPHEN 500; 5 MG/1; MG/1
TABLET ORAL
Status: DISCONTINUED | OUTPATIENT
Start: 2025-07-01 | End: 2025-07-03 | Stop reason: HOSPADM

## 2025-07-01 RX ADMIN — THIAMINE HCL TAB 100 MG 100 MG: 100 TAB at 01:07

## 2025-07-01 RX ADMIN — SODIUM CHLORIDE: 9 INJECTION, SOLUTION INTRAVENOUS at 12:07

## 2025-07-01 RX ADMIN — QUETIAPINE FUMARATE 50 MG: 25 TABLET ORAL at 08:07

## 2025-07-01 RX ADMIN — THIAMINE HCL TAB 100 MG 100 MG: 100 TAB at 10:07

## 2025-07-01 RX ADMIN — QUETIAPINE FUMARATE 100 MG: 100 TABLET ORAL at 09:07

## 2025-07-01 RX ADMIN — THIAMINE HCL TAB 100 MG 100 MG: 100 TAB at 06:07

## 2025-07-01 RX ADMIN — POTASSIUM CHLORIDE 40 MEQ: 1500 TABLET, EXTENDED RELEASE ORAL at 08:07

## 2025-07-01 RX ADMIN — BACLOFEN 5 MG: 5 TABLET ORAL at 06:07

## 2025-07-01 RX ADMIN — CHLORPROMAZINE HYDROCHLORIDE 25 MG: 25 TABLET, FILM COATED ORAL at 06:07

## 2025-07-01 RX ADMIN — LACTULOSE 20 G: 20 SOLUTION ORAL at 06:07

## 2025-07-01 RX ADMIN — SODIUM CHLORIDE 125 MG: 9 INJECTION, SOLUTION INTRAVENOUS at 01:07

## 2025-07-01 RX ADMIN — FAMOTIDINE 20 MG: 20 TABLET, FILM COATED ORAL at 09:07

## 2025-07-01 RX ADMIN — LACTULOSE 20 G: 20 SOLUTION ORAL at 11:07

## 2025-07-01 RX ADMIN — FAMOTIDINE 20 MG: 20 TABLET, FILM COATED ORAL at 08:07

## 2025-07-01 RX ADMIN — CHLORPROMAZINE HYDROCHLORIDE 25 MG: 25 TABLET, FILM COATED ORAL at 11:07

## 2025-07-01 RX ADMIN — LACTULOSE 20 G: 20 SOLUTION ORAL at 05:07

## 2025-07-01 RX ADMIN — Medication 800 MG: at 09:07

## 2025-07-01 RX ADMIN — PROPRANOLOL HYDROCHLORIDE 20 MG: 20 TABLET ORAL at 09:07

## 2025-07-01 RX ADMIN — Medication 800 MG: at 08:07

## 2025-07-01 NOTE — NURSING
Pt has not voided since around 7pm last night, pt very sleepy but awakens to speech, pt refuses to get up to go to bathroom or use urinal there is some urine noted on chux but pt will not allow us to change him at this time and verbalizes he just wants to sleep. Refused am meds as well.

## 2025-07-01 NOTE — PT/OT/SLP PROGRESS
Physical Therapy Treatment    Patient Name:  Yong Townsend   MRN:  [id number]    Recommendations:     Discharge Recommendations: Low Intensity Therapy  Discharge Equipment Recommendations: none  Barriers to discharge: current medical status    Assessment:     Yong Townsend is a 60 y.o. male admitted with a medical diagnosis of Alcoholic encephalopathy.  He presents with the following impairments/functional limitations: weakness, impaired endurance, impaired functional mobility, gait instability, impaired balance, impaired cognition, decreased safety awareness, decreased lower extremity function     Upon entering, patient was trying to put on his pants in bed. Patient states that he needs the restroom. Patient assisted to sit EOB and then to standing with min A to pull up pants due to unsteadiness. Patient used HHA and min/mod A to perform gait training to restroom due to short steps and unsteadiness. Patient sat on toilet and had large BM. Patient used grab bar and stood, with therapist assisting him with some perineal care due to unsteadiness. Required HHA/min A due to unsteadiness while walking in room. Patient required max A for safety cues while walking, including for posture and turns. Patient returned to sit EOB but continues to require max cues to sit safely on edge of bed. Patient returned to supine with min A. Patient left with HOB elevated and all needs reach with bed alarm on.    Rehab Prognosis: Good; patient would benefit from acute skilled PT services to address these deficits and reach maximum level of function.    Recent Surgery: * No surgery found *      Plan:     During this hospitalization, patient to be seen 5 x/week (5-6x weekly/1-2x daily) to address the identified rehab impairments via gait training, therapeutic activities, therapeutic exercises and progress toward the following goals:    Plan of Care Expires:  07/24/25    Subjective     Chief Complaint: none verbalized  Patient/Family  Comments/goals: to go home  Pain/Comfort:         Objective:     Communicated with nursing prior to session.  Patient found HOB elevated with bed alarm, telemetry, blood pressure cuff, pulse ox (continuous), peripheral IV upon PT entry to room.     General Precautions: Standard, fall  Orthopedic Precautions: N/A  Braces: N/A  Respiratory Status: Room air     Functional Mobility:  Bed Mobility:     Supine to Sit: contact guard assistance and minimum assistance  Sit to Supine: minimum assistance  Transfers:     Sit to Stand:  minimum assistance with hand-held assist  Gait: 20 feet to restroom, 20 feet in room with HHA and min/mod A   Balance: required min/mod A to walk today      AM-PAC 6 CLICK MOBILITY          Treatment & Education:  See above    Patient left HOB elevated with all lines intact, call button in reach, bed alarm on, nurse notified, and visitors present..    GOALS:   Multidisciplinary Problems       Physical Therapy Goals          Problem: Physical Therapy    Goal Priority Disciplines Outcome Interventions   Physical Therapy Goal     PT, PT/OT Progressing    Description: Goals to be met by: discharge     Patient will increase functional independence with mobility by performin. Supine to sit with Stand-by Assistance  2. Sit to stand transfer with Stand-by Assistance  3. Gait  x 75 feet with Stand-by Assistance using No Assistive Device.                          DME Justifications:  No DME recommended requiring DME justifications    Time Tracking:     PT Received On: 25  PT Start Time: 931     PT Stop Time: 946  PT Total Time (min): 15 min     Billable Minutes: Gait Training 15    Treatment Type: Treatment  PT/PTA: PT           2025

## 2025-07-01 NOTE — ASSESSMENT & PLAN NOTE
Hyponatremia is likely due to Dehydration/hypovolemia. The patient's most recent sodium results are listed below.  Recent Labs     07/01/25  0447 07/02/25  0452 07/03/25  0501   * 135* 139     stable

## 2025-07-01 NOTE — PROGRESS NOTES
Hospital Medicine  Progress Note    Patient Name: Yong Townsend  MRN: [id number]  Status: IP- Inpatient   Admission Date: 6/16/2025  Length of Stay: 15  Date of Service: 07/01/2025       CC: hospital follow-up for ams       SUBJECTIVE   60 years old male patient with unknown PMH but taking Trazodone, Baclofen, and Tizanidine was brought to the emergency room to be evaluated for unresponsiveness. Patient is currently pleasantly confused, spontaneously moving extremities in the bed but not following command verbally. Entire history is obtained from emergency and nursing report. I did try to reach to patients mother but unable to reach. As per the report patient was at [facility] this morning and went for the washroom around 9. His mother found him unresponsive in the washroom around 10:30 but for some reason she did not call 911 until 1:30 PM. She thought that patient will be responsive, but he did not so 911 was eventually called. On arrival CT scan of the head, CTA of the head and neck and MRI of the brain were obtained. Neurologist was also consulted. MRI of the brain is negative for any acute infarct, CT head is also negative for any acute findings as well as CT of the head and neck. Initial workup showed ammonia level of 166, hemoglobin 9.3, platelet count 75, sodium 141, potassium 3.9, and dimer of 0.85. Hie did get CTA head and neck so CTA chest was not obtained. On arrival patient's GCS level was only 8 and was only responsive to painful stimuli. During the course of the emergency room he has become more awake and GCS score improved. Before MRI patient was agitated so he required IV Versed and Lorazepam. Medicine team is asked to hospitalize the patient for further care. No fever or diarrhea or reported chest pain or SOB or tingling or numbness or slurred speech or focal weakness prior to unresponsiveness.      06/17/2025 pt admitted with hepatic encephalopathy, h/o significant ETOH use, drinks beer and whisky  according to his mom.  Work up in ER essentially negative.    Pt apparently has 2 epic charts with different MRN, [id number] and this chart [id number] in the original chart he has multiple admissions to [facility] in [place] for alcoholic cirrhosis, h/o esophageal banding and varicees back to 2018, ETOH use, Cocaine +, Marijuana + , Has reportedly seen Dr. Mon in the past, saw DR. Rodriguez in 2022 for varicees after being transferred to [facility] from [facility].  Pt with h/o Thrombocytopenia last 5/2025 was 23, received platelts in [place] at that time.  Mild Curly and anemia requiring transfusions in the past.  His ammonia was 150+ on admit here.  06/18/2025 pt more awake this am, improving mental staus, more sleepy this am  06/19/2025 pt with increased aggitation started mid morning yesterday back on iv ativan needed some precedex overnight due to combative behaviors, currently resting off precedex received iv ativan, on high dose thiamine and MVI iv bannana bags  06/20/2025 pt still confused ativan giving 2mg iv q 2 hrs, pt not taking po ativan  3/21/2025 started taking po meds today with breakfast  Will try adding po taper today   3/22/2025 more awake today but confused  Tolerating po intake   Held ativan this AM   6/23/2025 more responsive today  But confused ammonia level back up today to 74  Will increase lactulose   6/24/2025 ammonia down to 30  Remains pretty weak  But mental status much improved   6/25/2025  Feels better today  Did well with PT   6/26/2025  Ammonia back up today to 89  Worked with PT but very weak  Interested in going to [facility] rehab   6/27/2025  Ammonia level < 9 today  Agitated overnight     06/28/2025 pt still very confused about his dates and times, oriented to self and place somewhat, intitially thought he was in [place], able to be redirected seems to confabulate a lot.  He has a very extensive vocabulary and is very intelligent but very limited on details and gets aggitated when  challenged on his details especially related to recent behaviors in hospital.  06/29/2025 confusion is a bit less, still confabulates and very poor short term memory, less aggitated  06/30/2025 pt very sleepy in am after seroquel, still combative at times, throws things at staff this am, now sleeping  Today: Patient seen and examined at bedside, and chart reviewed.   07/01/2025 pt less confused, able to carry on conversation still gets easily aggitated and a bit argumentative at time, H&H has dropped to 6.9 will transfuse 1 U      MEDICATIONS   Scheduled   cholecalciferol (vitamin D3)  50,000 Units Oral Weekly    famotidine  20 mg Oral BID    ferric gluconate (Ferrlecit) IVPB  125 mg Intravenous Daily    lactulose  20 g Oral Q6H    losartan  50 mg Oral Daily    magnesium oxide  800 mg Oral BID    potassium chloride  40 mEq Oral Daily    propranoloL  20 mg Oral BID    QUEtiapine  100 mg Oral Nightly    QUEtiapine  50 mg Oral Daily    thiamine  100 mg Oral Q8H     Continuous Infusions    ROS limited due to mental status    PHYSICAL EXAM   VITALS: T 97.8 °F (36.6 °C)   /64   P 77   RR 18   O2 100 %    GENERAL: Awake and in NAD  LUNGS: CTA B/L  CVS: Normal rate  GI/: Soft, NT, bowel sounds positive.  EXTREMITIES: No peripheral edema  NEURO: AAOx3  PSYCH: Cooperative      LABS   CBC  Recent Labs     06/30/25  0459 07/01/25  0447   WBC 4.04 2.89*   RBC 2.60* 2.26*   HGB 7.9* 6.9*   HCT 24.5* 21.0*   MCV 94.2 92.9   MCH 30.4 30.5   MCHC 32.2 32.9   RDW 16.9 16.6   PLT 46* 39*     CHEM  Recent Labs     06/29/25  0600 06/30/25  0459 07/01/25  0447    132* 135*   K 4.0 4.9 4.3   CO2 23 21 21   BUN 17 18 19   CREATININE 1.16 1.24 1.10   CALCIUM 9.2 9.2 9.0   MG 1.70* 1.90  --    ALBUMIN 2.4* 2.4* 2.2*   GLOBULIN 4.0* 4.2* 3.7   ALKPHOS 180* 125 115   ALT 39 42 35   AST 76* 77* 67*   BILITOT 1.9* 2.4* 1.8*   FERRITIN  --  62.5  --    IRON  --  55*  --    TIBC  --  219*  --          MICROBIOLOGY     Microbiology  Results (last 7 days)       Procedure Component Value Units Date/Time    Blood Culture [[id number]] Collected: 06/22/25 2150    Order Status: Completed Specimen: Blood from Arm Updated: 06/28/25 0715     Blood Culture No Growth at 5 days    Blood Culture [[id number]] Collected: 06/22/25 2150    Order Status: Completed Specimen: Blood from Arm Updated: 06/28/25 0715     Blood Culture No Growth at 5 days              DIAGNOSTICS   US Abdomen Limited  Narrative: EXAMINATION:  STUDY: US ABDOMEN LIMITED    CLINICAL HISTORY AND TECHNIQUE:  Hepatic encephalopathy    COMPARISON:  None    FINDINGS:  The quality of this examination is very limited due to lack of patient cooperation and difficulty in proper positioning and control of respiration.    Liver:   The liver appears mildly atrophic with a slightly heterogeneous echogenic appearance but no worrisome focal parenchymal abnormalities are dilated biliary radicles.  There is adequate antegrade flow within the portal vein with no evidence of portal venous shunting.    Gallbladder/biliary system: The gallbladder is adequately distended with no evidence of cholelithiasis, thickening of the gallbladder wall or pericholecystic edema. The patient was not significantly tender while scanning over the gallbladder. The common bile duct, where visualized, is not dilated nor do I see evidence of intraductal stones.    Miscellaneous: N/a  Impression: 1. Less than optimal examination due to lack of patient cooperation, difficulty and proper positioning and control of respiration.  2. The liver appears mildly atrophic with a slightly heterogeneous echogenic appearance.  These findings are nonspecific but can be seen with cirrhosis and clinical correlation is recommended.    Electronically signed by: Nasir Aden  Date:    06/17/2025  Time:    07:32    Assessment and Plan      Syncope      Hepatic encephalopathy also may have some ETOH induced encephalopathy  Cont lactulose q 6 hr for now  "  Ammonia level up to 58, continue lacrtulose  Repeat am level   Telepsych consulted has been following  Decrease am seroquel to 50mg, continue 100mg at hs     Likely polypharmacy due to Baclofen,Tizanidine and Trazodone as well these meds are being held since admit     Thrombocytopenia due to cirrhosis  The likely etiology of thrombocytopenia is liver disease. The patients 3 most recent labs are listed below.          Recent Labs     06/18/25 0348 06/19/25 0440   PLT 46* 36*      Plan  - Will transfuse if platelet count is <50k (if undergoing surgical procedure or have active bleeding).  -        H/o GI bleed and esophageal varicees s/p banding 2018     Alcoholic Cirrhosis continue ativan iv, precedex if needed  No seizures or h/o seizures per family  Continue thimine and MVI  Po ativan taper     Hypomagnesemia  Patient with noted electrolyte deficiencies with Hypo-Magnesemia. Latest electrolytes have been reviewed and values are   Magnesium Level   Date Value Ref Range Status   06/30/2025 1.90 1.80 - 2.40 mg/dL Final   . Will continue to monitor electrolytes closely. Will replace the affected electrolytes and repeat labs to be done after interventions completed.  Increase po to 800mg bid  Give 2gm iv prn    Patient has Abnormal Magnesium: hypomagnesemia. Will continue to monitor electrolytes closely. Will replace the affected electrolytes and repeat labs to be done after interventions completed. The patient's magnesium results have been reviewed and are listed below.  No results for input(s): "MG" in the last 24 hours.        Anemia  Anemia is likely due to chronic blood loss and Iron deficiency. Most recent hemoglobin and hematocrit are listed below.          Recent Labs     06/18/25 0348 06/19/25  0440   HGB 8.3* 7.5*   HCT 25.2* 23.0*      Plan  - Monitor serial CBC: Daily  - Transfuse PRBC if patient becomes hemodynamically unstable, symptomatic or H/H drops below 7/21.  - Patient has not received any PRBC " transfusions to date  - Patient's anemia is currently stable  -  check irone and b12, folate levels  Known h/o esophageal varicees  Occult +        HTN  Patients blood pressure range in the last 24 hours was: BP  Min: 68/46  Max: 200/115.The patient's inpatient anti-hypertensive regimen is listed below:  Current Antihypertensives  , 2 times daily, Oral  propranoloL tablet 20 mg, 2 times daily, Oral  labetalol 20 mg/4 mL (5 mg/mL) IV syring, Every 4 hours PRN, Intravenous  hydrALAZINE injection 10 mg, Every 6 hours PRN, Intravenous  cloNIDine tablet 0.2 mg, Every 6 hours PRN, Oral  losartan tablet 50 mg, Daily, Oral     Plan  -Patients blood pressure range in the last 24 hours was: BP  Min: 68/46  Max: 200/115.The patient's inpatient anti-hypertensive regimen is listed below:  Current Antihypertensives  , 2 times daily, Oral  propranoloL tablet 20 mg, 2 times daily, Oral  labetalol 20 mg/4 mL (5 mg/mL) IV syring, Every 4 hours PRN, Intravenous  hydrALAZINE injection 10 mg, Every 6 hours PRN, Intravenous  cloNIDine tablet 0.2 mg, Every 6 hours PRN, Oral  losartan tablet 50 mg, Daily, Oral    Plan  - BP is controlled, no changes needed to their regimen  -            Continue   Fall precautions   Seizure precautions      Continue Lactulose p    Cont PT  Case mgmt- rehab consult      U[facility] marcus Cesar MD  [facility] Medicine

## 2025-07-02 LAB
ABS NEUT CALC (OHS): 1.72 X10(3)/MCL (ref 2.1–9.2)
ALBUMIN SERPL-MCNC: 2.3 G/DL (ref 3.4–5)
ALBUMIN/GLOB SERPL: 0.6 RATIO
ALP SERPL-CCNC: 131 UNIT/L (ref 50–144)
ALT SERPL-CCNC: 38 UNIT/L (ref 1–45)
AMMONIA PLAS-MSCNC: 73 UMOL/L (ref 11–32)
ANION GAP SERPL CALC-SCNC: 1 MEQ/L (ref 2–13)
ANISOCYTOSIS BLD QL SMEAR: ABNORMAL
AST SERPL-CCNC: 72 UNIT/L (ref 17–59)
BASOPHILS NFR BLD MANUAL: 0.06 X10(3)/MCL (ref 0–0.2)
BASOPHILS NFR BLD MANUAL: 2 % (ref 0–2)
BILIRUB SERPL-MCNC: 2.6 MG/DL (ref 0–1)
BUN SERPL-MCNC: 14 MG/DL (ref 7–20)
CALCIUM SERPL-MCNC: 8.9 MG/DL (ref 8.4–10.2)
CHLORIDE SERPL-SCNC: 113 MMOL/L (ref 98–110)
CO2 SERPL-SCNC: 21 MMOL/L (ref 21–32)
CREAT SERPL-MCNC: 0.91 MG/DL (ref 0.66–1.25)
CREAT/UREA NIT SERPL: 15 (ref 12–20)
ELLIPTOCYTOSIS (OHS): ABNORMAL
EOSINOPHIL NFR BLD MANUAL: 0.11 X10(3)/MCL (ref 0–0.9)
EOSINOPHIL NFR BLD MANUAL: 4 % (ref 0–3)
ERYTHROCYTE [DISTWIDTH] IN BLOOD BY AUTOMATED COUNT: 16.7 %
GFR SERPLBLD CREATININE-BSD FMLA CKD-EPI: >90 ML/MIN/1.73/M2
GLOBULIN SER-MCNC: 3.9 GM/DL (ref 2–3.9)
GLUCOSE SERPL-MCNC: 98 MG/DL (ref 70–115)
HCT VFR BLD AUTO: 23.8 % (ref 36–52)
HGB BLD-MCNC: 7.9 G/DL (ref 13–18)
LYMPHOCYTES NFR BLD MANUAL: 0.69 X10(3)/MCL (ref 0.6–4.6)
LYMPHOCYTES NFR BLD MANUAL: 24 % (ref 20–55)
MAGNESIUM SERPL-MCNC: 1.6 MG/DL (ref 1.8–2.4)
MCH RBC QN AUTO: 30.4 PG (ref 27–34)
MCHC RBC AUTO-ENTMCNC: 33.2 G/DL (ref 31–37)
MCV RBC AUTO: 91.5 FL (ref 79–99)
MONOCYTES NFR BLD MANUAL: 0.29 X10(3)/MCL (ref 0.1–1.3)
MONOCYTES NFR BLD MANUAL: 10 % (ref 0–10)
NEUTROPHILS NFR BLD MANUAL: 60 % (ref 37–73)
PLATELET # BLD AUTO: 40 X10(3)/MCL (ref 140–371)
PLATELET # BLD EST: ABNORMAL 10*3/UL
PMV BLD AUTO: ABNORMAL FL
POIKILOCYTOSIS BLD QL SMEAR: ABNORMAL
POTASSIUM SERPL-SCNC: 4 MMOL/L (ref 3.5–5.1)
PROT SERPL-MCNC: 6.2 GM/DL (ref 6.3–8.2)
RBC # BLD AUTO: 2.6 X10(6)/MCL (ref 4–6)
RBC MORPH BLD: ABNORMAL
SODIUM SERPL-SCNC: 135 MMOL/L (ref 136–145)
WBC # BLD AUTO: 2.86 X10(3)/MCL (ref 4–11.5)

## 2025-07-02 PROCEDURE — 25000003 PHARM REV CODE 250: Performed by: FAMILY MEDICINE

## 2025-07-02 PROCEDURE — 63600175 PHARM REV CODE 636 W HCPCS: Performed by: FAMILY MEDICINE

## 2025-07-02 PROCEDURE — 36415 COLL VENOUS BLD VENIPUNCTURE: CPT | Performed by: FAMILY MEDICINE

## 2025-07-02 PROCEDURE — 82140 ASSAY OF AMMONIA: CPT | Performed by: FAMILY MEDICINE

## 2025-07-02 PROCEDURE — 97116 GAIT TRAINING THERAPY: CPT

## 2025-07-02 PROCEDURE — 83735 ASSAY OF MAGNESIUM: CPT | Performed by: FAMILY MEDICINE

## 2025-07-02 PROCEDURE — 85025 COMPLETE CBC W/AUTO DIFF WBC: CPT | Performed by: FAMILY MEDICINE

## 2025-07-02 PROCEDURE — 11000001 HC ACUTE MED/SURG PRIVATE ROOM

## 2025-07-02 PROCEDURE — 94761 N-INVAS EAR/PLS OXIMETRY MLT: CPT

## 2025-07-02 PROCEDURE — 80053 COMPREHEN METABOLIC PANEL: CPT | Performed by: FAMILY MEDICINE

## 2025-07-02 RX ORDER — MAGNESIUM SULFATE HEPTAHYDRATE 40 MG/ML
2 INJECTION, SOLUTION INTRAVENOUS ONCE
Status: COMPLETED | OUTPATIENT
Start: 2025-07-02 | End: 2025-07-02

## 2025-07-02 RX ADMIN — LOSARTAN POTASSIUM 50 MG: 50 TABLET, FILM COATED ORAL at 08:07

## 2025-07-02 RX ADMIN — PROPRANOLOL HYDROCHLORIDE 20 MG: 20 TABLET ORAL at 08:07

## 2025-07-02 RX ADMIN — MAGNESIUM SULFATE HEPTAHYDRATE 2 G: 40 INJECTION, SOLUTION INTRAVENOUS at 08:07

## 2025-07-02 RX ADMIN — Medication 50000 UNITS: at 08:07

## 2025-07-02 RX ADMIN — LACTULOSE 20 G: 20 SOLUTION ORAL at 05:07

## 2025-07-02 RX ADMIN — SODIUM CHLORIDE 125 MG: 9 INJECTION, SOLUTION INTRAVENOUS at 10:07

## 2025-07-02 RX ADMIN — FAMOTIDINE 20 MG: 20 TABLET, FILM COATED ORAL at 09:07

## 2025-07-02 RX ADMIN — LACTULOSE 20 G: 20 SOLUTION ORAL at 12:07

## 2025-07-02 RX ADMIN — THIAMINE HCL TAB 100 MG 100 MG: 100 TAB at 05:07

## 2025-07-02 RX ADMIN — SODIUM CHLORIDE: 9 INJECTION, SOLUTION INTRAVENOUS at 08:07

## 2025-07-02 RX ADMIN — Medication 800 MG: at 08:07

## 2025-07-02 RX ADMIN — LACTULOSE 60 G: 20 SOLUTION ORAL at 11:07

## 2025-07-02 RX ADMIN — THIAMINE HCL TAB 100 MG 100 MG: 100 TAB at 02:07

## 2025-07-02 RX ADMIN — LACTULOSE 60 G: 20 SOLUTION ORAL at 05:07

## 2025-07-02 RX ADMIN — QUETIAPINE FUMARATE 50 MG: 25 TABLET ORAL at 08:07

## 2025-07-02 RX ADMIN — Medication 800 MG: at 09:07

## 2025-07-02 RX ADMIN — PROPRANOLOL HYDROCHLORIDE 20 MG: 20 TABLET ORAL at 09:07

## 2025-07-02 RX ADMIN — LACTULOSE 60 G: 20 SOLUTION ORAL at 02:07

## 2025-07-02 RX ADMIN — THIAMINE HCL TAB 100 MG 100 MG: 100 TAB at 09:07

## 2025-07-02 RX ADMIN — CHLORPROMAZINE HYDROCHLORIDE 25 MG: 25 TABLET, FILM COATED ORAL at 10:07

## 2025-07-02 RX ADMIN — LACTULOSE 60 G: 20 SOLUTION ORAL at 09:07

## 2025-07-02 RX ADMIN — POTASSIUM CHLORIDE 40 MEQ: 1500 TABLET, EXTENDED RELEASE ORAL at 08:07

## 2025-07-02 RX ADMIN — FAMOTIDINE 20 MG: 20 TABLET, FILM COATED ORAL at 08:07

## 2025-07-02 RX ADMIN — QUETIAPINE FUMARATE 100 MG: 100 TABLET ORAL at 09:07

## 2025-07-02 NOTE — PROGRESS NOTES
7/2/2025  Yong Townsend   1965   [id number]        Psychiatry Progress Note       SUBJECTIVE:   Yong Townsend is a 60 y.o. male with a past medical history that includes HTN, alcohol use, gout, HLD, schizophrenia, vitamin-D deficiency, alcoholic cirrhosis, esophageal varices who presented to [facility] ED on 06/16/25 due to loss of consciousness. In ED documented to have passed out while having a bowel movement and was only responding to pain on arrival. Appears that he was preparing to go to [facility] and went to the bathroom at 0900. Was found unresponsive by his mother around 1030 and 911 called at 1330.  MRI of the brain is negative for any acute infarct, CT head is also negative for any acute findings as well as CT of the head and neck. Initial workup showed ammonia level of 166, hemoglobin 9.3, platelet count 75, sodium 141, potassium 3.9, and dimer of 0.85. He did get CTA head and neck so CTA chest was not obtained.     Seen over televisit today due to question if inpatient psychiatric hospitalization would be appropriate. Resting quietly in bed in casual clothes. Overall cooperative with evaluation but displays irritable mood/affect and some paranoia at questions asked. Thought process circumstantial but without delusional ideations. Oriented to person and month/year. Is aware he is in a hospital but states he is not sure which before stating he was told he was in [place]. Reported to be argumentative at times. Denies suicidal ideations, homicidal ideations, or auditory/visual hallucinations.        Current Medications:   Scheduled Meds:    cholecalciferol (vitamin D3)  50,000 Units Oral Weekly    famotidine  20 mg Oral BID    ferric gluconate (Ferrlecit) IVPB  125 mg Intravenous Daily    lactulose  60 g Oral Q4H    losartan  50 mg Oral Daily    magnesium oxide  800 mg Oral BID    potassium chloride  40 mEq Oral Daily    propranoloL  20 mg Oral BID    QUEtiapine  100 mg Oral Nightly    QUEtiapine  50 mg  Oral Daily    thiamine  100 mg Oral Q8H      PRN Meds:   Current Facility-Administered Medications:     0.9%  NaCl infusion (for blood administration), , Intravenous, Q24H PRN    0.9% NaCl, , Intravenous, PRN    baclofen, 5 mg, Oral, TID PRN    chlorproMAZINE, 25 mg, Oral, TID PRN    cloNIDine, 0.2 mg, Oral, Q6H PRN    dextrose 50%, 12.5 g, Intravenous, PRN    dextrose 50%, 25 g, Intravenous, PRN    glucagon (human recombinant), 1 mg, Intramuscular, PRN    glucose, 16 g, Oral, PRN    glucose, 24 g, Oral, PRN    haloperidol lactate, 2.5 mg, Intramuscular, Q6H PRN    hydrALAZINE, 10 mg, Intravenous, Q6H PRN    ibuprofen, 600 mg, Oral, Q6H PRN    labetaloL, 20 mg, Intravenous, Q4H PRN    lorazepam, 2 mg, Intravenous, Q2H PRN    naloxone, 0.02 mg, Intravenous, PRN    ondansetron, 4 mg, Intravenous, Q8H PRN    prochlorperazine, 5 mg, Intravenous, Q6H PRN    sodium chloride 0.9%, 10 mL, Intravenous, PRN    sodium chloride 0.9%, 10 mL, Intravenous, Q12H PRN    ziprasidone, 20 mg, Intramuscular, Q12H PRN   Psychotherapeutics (From admission, onward)      Start     Stop Route Frequency Ordered    07/01/25 0900  QUEtiapine tablet 50 mg         -- Oral Daily 06/30/25 1530    06/30/25 2100  QUEtiapine tablet 100 mg         -- Oral Nightly 06/30/25 1530    06/27/25 1426  haloperidol lactate injection 2.5 mg         -- IM Every 6 hours PRN 06/27/25 1427    06/26/25 1132  chlorproMAZINE tablet 25 mg         -- Oral 3 times daily PRN 06/26/25 1032    06/23/25 0615  LORazepam injection 2 mg         -- IV Every 2 hours PRN 06/23/25 0513    06/19/25 2047  ziprasidone injection 20 mg         -- IM Every 12 hours PRN 06/19/25 1947            Allergies:   Review of patient's allergies indicates:  No Known Allergies     OBJECTIVE:   Vitals   Vitals:    07/02/25 1503   BP: 123/67   Pulse: 83   Resp: 18   Temp: 98.4 °F (36.9 °C)        Labs/Imaging/Studies:   Recent Results (from the past 36 hours)   Comprehensive Metabolic Panel     Collection Time: 07/01/25  4:47 AM   Result Value Ref Range    Sodium 135 (L) 136 - 145 mmol/L    Potassium 4.3 3.5 - 5.1 mmol/L    Chloride 114 (H) 98 - 110 mmol/L    CO2 21 21 - 32 mmol/L    Glucose 101 70 - 115 mg/dL    Blood Urea Nitrogen 19 7.0 - 20.0 mg/dL    Creatinine 1.10 0.66 - 1.25 mg/dL    Calcium 9.0 8.4 - 10.2 mg/dL    Protein Total 5.9 (L) 6.3 - 8.2 gm/dL    Albumin 2.2 (L) 3.4 - 5.0 g/dL    Globulin 3.7 2.0 - 3.9 gm/dL    Albumin/Globulin Ratio 0.6 ratio    Bilirubin Total 1.8 (H) 0.0 - 1.0 mg/dL     50 - 144 unit/L    ALT 35 1 - 45 unit/L    AST 67 (H) 17 - 59 unit/L    eGFR 77 mL/min/1.73/m2    Anion Gap 0.0 (L) 2.0 - 13.0 mEq/L    BUN/Creatinine Ratio 17 12 - 20   CBC with Differential    Collection Time: 07/01/25  4:47 AM   Result Value Ref Range    WBC 2.89 (L) 4.00 - 11.50 x10(3)/mcL    RBC 2.26 (L) 4.00 - 6.00 x10(6)/mcL    Hgb 6.9 (L) 13.0 - 18.0 g/dL    Hct 21.0 (L) 36.0 - 52.0 %    MCV 92.9 79.0 - 99.0 fL    MCH 30.5 27.0 - 34.0 pg    MCHC 32.9 31.0 - 37.0 g/dL    RDW 16.6 %    Platelet 39 (LL) 140 - 371 x10(3)/mcL    MPV      Neut % 45.7 37 - 73 %    Lymph % 29.1 20 - 55 %    Mono % 21.5 (H) 4.7 - 12.5 %    Eos % 3.1 0.7 - 7 %    Basophil % 0.3 0.1 - 1.2 %    Lymph # 0.84 (L) 1.32 - 3.57 x10(3)/mcL    Neut # 1.32 (L) 1.78 - 5.38 x10(3)/mcL    Mono # 0.62 0.3 - 0.82 x10(3)/mcL    Eos # 0.09 0.04 - 0.54 x10(3)/mcL    Baso # 0.01 0.01 - 0.08 x10(3)/mcL    Imm Gran # 0.01 0.00 - 0.03 x10(3)/mcL    Imm Grans % 0.3 0 - 0.5 %   Blood Smear Microscopic Exam    Collection Time: 07/01/25  4:47 AM   Result Value Ref Range    RBC Morph Abnormal (A) Normal    Anisocytosis 1+ (A) (none)    Elliptocytosis 1+ (A) (none)    Poikilocytosis 1+ (A) (none)    Platelets Decreased (A) Normal, Adequate   ABORH RETYPE    Collection Time: 07/01/25  4:47 AM   Result Value Ref Range    ABORH Retype A POS    Prepare RBC 1 Unit    Collection Time: 07/01/25 12:15 PM   Result Value Ref Range    UNIT NUMBER  [id number]     UNIT ABO/RH A POS     DISPENSE STATUS Transfused     Unit Expiration [id number]     Product Code [id number]     Unit Blood Type Code 6200     CROSSMATCH INTERPRETATION Compatible    Type & Screen    Collection Time: 07/01/25 12:15 PM   Result Value Ref Range    Group & Rh A POS     Indirect [place] GEL NEG     Specimen Outdate 07/04/2025 23:59    Comprehensive Metabolic Panel    Collection Time: 07/02/25  4:52 AM   Result Value Ref Range    Sodium 135 (L) 136 - 145 mmol/L    Potassium 4.0 3.5 - 5.1 mmol/L    Chloride 113 (H) 98 - 110 mmol/L    CO2 21 21 - 32 mmol/L    Glucose 98 70 - 115 mg/dL    Blood Urea Nitrogen 14 7.0 - 20.0 mg/dL    Creatinine 0.91 0.66 - 1.25 mg/dL    Calcium 8.9 8.4 - 10.2 mg/dL    Protein Total 6.2 (L) 6.3 - 8.2 gm/dL    Albumin 2.3 (L) 3.4 - 5.0 g/dL    Globulin 3.9 2.0 - 3.9 gm/dL    Albumin/Globulin Ratio 0.6 ratio    Bilirubin Total 2.6 (H) 0.0 - 1.0 mg/dL     50 - 144 unit/L    ALT 38 1 - 45 unit/L    AST 72 (H) 17 - 59 unit/L    eGFR >90 mL/min/1.73/m2    Anion Gap 1.0 (L) 2.0 - 13.0 mEq/L    BUN/Creatinine Ratio 15 12 - 20   Magnesium    Collection Time: 07/02/25  4:52 AM   Result Value Ref Range    Magnesium Level 1.60 (L) 1.80 - 2.40 mg/dL   Ammonia    Collection Time: 07/02/25  4:52 AM   Result Value Ref Range    Ammonia Level 73.0 (H) 11.0 - 32.0 umol/L   CBC with Differential    Collection Time: 07/02/25  4:52 AM   Result Value Ref Range    WBC 2.86 (L) 4.00 - 11.50 x10(3)/mcL    RBC 2.60 (L) 4.00 - 6.00 x10(6)/mcL    Hgb 7.9 (L) 13.0 - 18.0 g/dL    Hct 23.8 (L) 36.0 - 52.0 %    MCV 91.5 79.0 - 99.0 fL    MCH 30.4 27.0 - 34.0 pg    MCHC 33.2 31.0 - 37.0 g/dL    RDW 16.7 %    Platelet 40 (L) 140 - 371 x10(3)/mcL    MPV     Manual Differential    Collection Time: 07/02/25  4:52 AM   Result Value Ref Range    Neutrophils % 60 37 - 73 %    Lymphs % 24 20 - 55 %    Monocytes % 10 0 - 10 %    Eosinophils % 4 (H) 0 - 3 %    Basophils % 2 0 - 2 %    Neutrophils  Abs Calc 1.716 (L) 2.1 - 9.2 x10(3)/mcL    Basophils Abs 0.0572 0 - 0.2 x10(3)/mcL    Lymphs Abs 0.6864 0.6 - 4.6 x10(3)/mcL    Eosinophils Abs 0.1144 0 - 0.9 x10(3)/mcL    Monocytes Abs 0.286 0.1 - 1.3 x10(3)/mcL    Platelets Decreased (A) Normal, Adequate    RBC Morph Abnormal (A) Normal    Anisocytosis 1+ (A) (none)    Poikilocytosis 1+ (A) (none)    Elliptocytosis 1+ (A) (none)          Psychiatric Mental Status Exam:  General Appearance: appears stated age, dressed in casual attire, lying in bed, in no acute distress  Arousal: alert  Behavior: cooperative, appropriate eye-contact, under good behavioral control  Movements and Motor Activity: no tics, no tremors, no akathisia, no dystonia, no evidence of tardive dyskinesia  Orientation: Oriented to person, month/year, and that he is in a hospital  Speech: conversational, spontaneous, coherent  Mood: Irritable  Affect: mood-congruent  Thought Process: circumstantial  Associations: no loosening of associations  Thought Content and Perceptions: no suicidal ideation, no homicidal ideation, + paranoid ideation, no hallucinations  Recent and Remote Memory: significant impairments noted; per interview/observation with patient  Attention and Concentration: easily distractible; per interview/observation with patient  Fund of Knowledge: vocabulary appropriate; based on history, vocabulary, fund of knowledge, syntax, grammar, and content  Insight: questionable; based on understanding of severity of illness and HPI  Judgment: questionable; based on patient's behavior and HPI    ASSESSMENT/PLAN:   Problems Addressed/Diagnoses:  Delirium like to due to hepatic encephalopathy  Alcohol use disorder      Plan:  Medication Management  Continue quetiapine 50mg PO QD/100mg PO QHS  Legal  PEC not recommended. Low risk of imminent harm to self or others. Not currently gravely disabled due to acute psychiatric condition.  Disposition  Per primary team  Psychiatry will  sign-off        Date of service:07/02/25  Last visit: 06/27/25  Real-time video with audio  Verbal consent obtained from patient for visit  Patient location: [facility] [address], [place] [address]   Provider location: [address] [place] [address] [place] [address]  Start time 1542  End time 1601          Alfred Mario

## 2025-07-02 NOTE — PLAN OF CARE
Problem: Adult Inpatient Plan of Care  Goal: Plan of Care Review  Outcome: Progressing  Goal: Patient-Specific Goal (Individualized)  Outcome: Progressing  Goal: Absence of Hospital-Acquired Illness or Injury  Outcome: Progressing  Goal: Optimal Comfort and Wellbeing  Outcome: Progressing     Problem: Delirium  Goal: Optimal Coping  Outcome: Progressing  Goal: Improved Behavioral Control  Outcome: Progressing  Goal: Improved Attention and Thought Clarity  Outcome: Progressing  Goal: Improved Sleep  Outcome: Progressing     Problem: Fall Injury Risk  Goal: Absence of Fall and Fall-Related Injury  Outcome: Progressing     Problem: Infection  Goal: Absence of Infection Signs and Symptoms  Outcome: Progressing     Problem: Skin Injury Risk Increased  Goal: Skin Health and Integrity  Outcome: Progressing

## 2025-07-02 NOTE — PLAN OF CARE
Patient's mother notified that the IP rehab had denied the patient.  We discussed other options for discharge such as IP Psych, SNF Placement or Home Health with PT.  She is willing for the patient to return to her home at discharge.  She would like the patient to be set up with home health and home PT at discharge.  She is going to visit the patient later today and discuss the plan with him.

## 2025-07-02 NOTE — PROGRESS NOTES
Hospital Medicine  Progress Note    Patient Name: Yong Townsend  MRN: [id number]  Status: IP- Inpatient   Admission Date: 6/16/2025  Length of Stay: 16  Date of Service: 07/02/2025       CC: hospital follow-up for ams       SUBJECTIVE   60 years old male patient with unknown PMH but taking Trazodone, Baclofen, and Tizanidine was brought to the emergency room to be evaluated for unresponsiveness. Patient is currently pleasantly confused, spontaneously moving extremities in the bed but not following command verbally. Entire history is obtained from emergency and nursing report. I did try to reach to patients mother but unable to reach. As per the report patient was at [facility] this morning and went for the washroom around 9. His mother found him unresponsive in the washroom around 10:30 but for some reason she did not call 911 until 1:30 PM. She thought that patient will be responsive, but he did not so 911 was eventually called. On arrival CT scan of the head, CTA of the head and neck and MRI of the brain were obtained. Neurologist was also consulted. MRI of the brain is negative for any acute infarct, CT head is also negative for any acute findings as well as CT of the head and neck. Initial workup showed ammonia level of 166, hemoglobin 9.3, platelet count 75, sodium 141, potassium 3.9, and dimer of 0.85. Hie did get CTA head and neck so CTA chest was not obtained. On arrival patient's GCS level was only 8 and was only responsive to painful stimuli. During the course of the emergency room he has become more awake and GCS score improved. Before MRI patient was agitated so he required IV Versed and Lorazepam. Medicine team is asked to hospitalize the patient for further care. No fever or diarrhea or reported chest pain or SOB or tingling or numbness or slurred speech or focal weakness prior to unresponsiveness.      06/17/2025 pt admitted with hepatic encephalopathy, h/o significant ETOH use, drinks beer and whisky  according to his mom.  Work up in ER essentially negative.    Pt apparently has 2 epic charts with different MRN, [id number] and this chart [id number] in the original chart he has multiple admissions to [facility] in [place] for alcoholic cirrhosis, h/o esophageal banding and varicees back to 2018, ETOH use, Cocaine +, Marijuana + , Has reportedly seen Dr. Mon in the past, saw DR. Rodriguez in 2022 for varicees after being transferred to [facility] from [facility].  Pt with h/o Thrombocytopenia last 5/2025 was 23, received platelts in [place] at that time.  Mild Curly and anemia requiring transfusions in the past.  His ammonia was 150+ on admit here.  06/18/2025 pt more awake this am, improving mental staus, more sleepy this am  06/19/2025 pt with increased aggitation started mid morning yesterday back on iv ativan needed some precedex overnight due to combative behaviors, currently resting off precedex received iv ativan, on high dose thiamine and MVI iv bannana bags  06/20/2025 pt still confused ativan giving 2mg iv q 2 hrs, pt not taking po ativan  3/21/2025 started taking po meds today with breakfast  Will try adding po taper today   3/22/2025 more awake today but confused  Tolerating po intake   Held ativan this AM   6/23/2025 more responsive today  But confused ammonia level back up today to 74  Will increase lactulose   6/24/2025 ammonia down to 30  Remains pretty weak  But mental status much improved   6/25/2025  Feels better today  Did well with PT   6/26/2025  Ammonia back up today to 89  Worked with PT but very weak  Interested in going to [facility] rehab   6/27/2025  Ammonia level < 9 today  Agitated overnight     06/28/2025 pt still very confused about his dates and times, oriented to self and place somewhat, intitially thought he was in [place], able to be redirected seems to confabulate a lot.  He has a very extensive vocabulary and is very intelligent but very limited on details and gets aggitated when  challenged on his details especially related to recent behaviors in hospital.  06/29/2025 confusion is a bit less, still confabulates and very poor short term memory, less aggitated  06/30/2025 pt very sleepy in am after seroquel, still combative at times, throws things at staff this am, now sleeping  Today: Patient seen and examined at bedside, and chart reviewed.   07/01/2025 pt less confused, able to carry on conversation still gets easily aggitated and a bit argumentative at time, H&H has dropped to 6.9 will transfuse 1 U  07/02/2025 pt less confused, still argumentative at times, but is improving, Weak and workin tiago PT, not approved for rehab.  S/p 1 U PRBC, chronic blood loss due to GI and h/o cirrhosis and esophageal varicees, no acute blood loss.      MEDICATIONS   Scheduled   cholecalciferol (vitamin D3)  50,000 Units Oral Weekly    famotidine  20 mg Oral BID    ferric gluconate (Ferrlecit) IVPB  125 mg Intravenous Daily    lactulose  60 g Oral Q4H    losartan  50 mg Oral Daily    magnesium oxide  800 mg Oral BID    potassium chloride  40 mEq Oral Daily    propranoloL  20 mg Oral BID    QUEtiapine  100 mg Oral Nightly    QUEtiapine  50 mg Oral Daily    thiamine  100 mg Oral Q8H     Continuous Infusions    ROS limited due to mental status    PHYSICAL EXAM   VITALS: T 98.2 °F (36.8 °C)   BP (!) 103/59   P 82   RR 18   O2 100 %    GENERAL: Awake and in NAD  LUNGS: CTA B/L  CVS: Normal rate  GI/: Soft, NT, bowel sounds positive.  EXTREMITIES: No peripheral edema  NEURO: AAOx3  PSYCH: Cooperative      LABS   CBC  Recent Labs     07/01/25 0447 07/02/25 0452   WBC 2.89* 2.86*   RBC 2.26* 2.60*   HGB 6.9* 7.9*   HCT 21.0* 23.8*   MCV 92.9 91.5   MCH 30.5 30.4   MCHC 32.9 33.2   RDW 16.6 16.7   PLT 39* 40*     CHEM  Recent Labs     06/30/25  0459 07/01/25 0447 07/02/25 0452   * 135* 135*   K 4.9 4.3 4.0   CO2 21 21 21   BUN 18 19 14   CREATININE 1.24 1.10 0.91   CALCIUM 9.2 9.0 8.9   MG 1.90  --   1.60*   ALBUMIN 2.4* 2.2* 2.3*   GLOBULIN 4.2* 3.7 3.9   ALKPHOS 125 115 131   ALT 42 35 38   AST 77* 67* 72*   BILITOT 2.4* 1.8* 2.6*   FERRITIN 62.5  --   --    IRON 55*  --   --    TIBC 219*  --   --          MICROBIOLOGY     Microbiology Results (last 7 days)       Procedure Component Value Units Date/Time    Blood Culture [[id number]] Collected: 06/22/25 2150    Order Status: Completed Specimen: Blood from Arm Updated: 06/28/25 0715     Blood Culture No Growth at 5 days    Blood Culture [[id number]] Collected: 06/22/25 2150    Order Status: Completed Specimen: Blood from Arm Updated: 06/28/25 0715     Blood Culture No Growth at 5 days              DIAGNOSTICS   US Abdomen Limited  Narrative: EXAMINATION:  STUDY: US ABDOMEN LIMITED    CLINICAL HISTORY AND TECHNIQUE:  Hepatic encephalopathy    COMPARISON:  None    FINDINGS:  The quality of this examination is very limited due to lack of patient cooperation and difficulty in proper positioning and control of respiration.    Liver:   The liver appears mildly atrophic with a slightly heterogeneous echogenic appearance but no worrisome focal parenchymal abnormalities are dilated biliary radicles.  There is adequate antegrade flow within the portal vein with no evidence of portal venous shunting.    Gallbladder/biliary system: The gallbladder is adequately distended with no evidence of cholelithiasis, thickening of the gallbladder wall or pericholecystic edema. The patient was not significantly tender while scanning over the gallbladder. The common bile duct, where visualized, is not dilated nor do I see evidence of intraductal stones.    Miscellaneous: N/a  Impression: 1. Less than optimal examination due to lack of patient cooperation, difficulty and proper positioning and control of respiration.  2. The liver appears mildly atrophic with a slightly heterogeneous echogenic appearance.  These findings are nonspecific but can be seen with cirrhosis and clinical  "correlation is recommended.    Electronically signed by: Nasir Aden  Date:    06/17/2025  Time:    07:32    Assessment and Plan      Syncope   resolved     Hepatic encephalopathy also may have some ETOH induced encephalopathy  Cont lactulose q 6 hr for now   Ammonia level up to 58, continue lacrtulose  Repeat am level   Telepsych consulted has been following  Decrease am seroquel to 50mg, continue 100mg at hs     Likely polypharmacy due to Baclofen,Tizanidine and Trazodone as well these meds are being held since admit     Thrombocytopenia due to cirrhosis  The likely etiology of thrombocytopenia is liver disease. The patients 3 most recent labs are listed below.          Recent Labs     06/18/25  0348 06/19/25  0440   PLT 46* 36*      Plan  - Will transfuse if platelet count is <50k (if undergoing surgical procedure or have active bleeding).  -        H/o GI bleed and esophageal varicees s/p banding 2018     Alcoholic Cirrhosis continue ativan iv, precedex if needed  No seizures or h/o seizures per family  Continue thimine and MVI        Hypomagnesemia  Patient with noted electrolyte deficiencies with Hypo-Magnesemia. Latest electrolytes have been reviewed and values are   Magnesium Level   Date Value Ref Range Status   07/02/2025 1.60 (L) 1.80 - 2.40 mg/dL Final   . Will continue to monitor electrolytes closely. Will replace the affected electrolytes and repeat labs to be done after interventions completed.  Increase po to 800mg bid  Give 2gm iv prn today    Patient has Abnormal Magnesium: hypomagnesemia. Will continue to monitor electrolytes closely. Will replace the affected electrolytes and repeat labs to be done after interventions completed. The patient's magnesium results have been reviewed and are listed below.  No results for input(s): "MG" in the last 24 hours.        Anemia  Anemia is likely due to chronic blood loss and Iron deficiency. Most recent hemoglobin and hematocrit are listed below.        "   Recent Labs     06/18/25  0348 06/19/25  0440   HGB 8.3* 7.5*   HCT 25.2* 23.0*      Plan  - Monitor serial CBC: Daily  - Transfuse PRBC if patient becomes hemodynamically unstable, symptomatic or H/H drops below 7/21.  - Patient has not received any PRBC transfusions to date  - Patient's anemia is currently stable  -  check irone and b12, folate levels  Known h/o esophageal varicees  Occult +        HTN  Patients blood pressure range in the last 24 hours was: BP  Min: 68/46  Max: 200/115.The patient's inpatient anti-hypertensive regimen is listed below:  Current Antihypertensives  , 2 times daily, Oral  propranoloL tablet 20 mg, 2 times daily, Oral  labetalol 20 mg/4 mL (5 mg/mL) IV syring, Every 4 hours PRN, Intravenous  hydrALAZINE injection 10 mg, Every 6 hours PRN, Intravenous  cloNIDine tablet 0.2 mg, Every 6 hours PRN, Oral  losartan tablet 50 mg, Daily, Oral     Plan  -Patients blood pressure range in the last 24 hours was: BP  Min: 68/46  Max: 200/115.The patient's inpatient anti-hypertensive regimen is listed below:  Current Antihypertensives  , 2 times daily, Oral  propranoloL tablet 20 mg, 2 times daily, Oral  labetalol 20 mg/4 mL (5 mg/mL) IV syring, Every 4 hours PRN, Intravenous  hydrALAZINE injection 10 mg, Every 6 hours PRN, Intravenous  cloNIDine tablet 0.2 mg, Every 6 hours PRN, Oral  losartan tablet 50 mg, Daily, Oral    Plan  - BP is controlled, no changes needed to their regimen  -                            Taisha Cesar MD  [facility] Medicine

## 2025-07-02 NOTE — PT/OT/SLP PROGRESS
Physical Therapy Treatment    Patient Name:  Yong Townsend   MRN:  [id number]    Recommendations:     Discharge Recommendations: Low Intensity Therapy  Discharge Equipment Recommendations: none  Barriers to discharge: current medical status    Assessment:     Yong Townsend is a 60 y.o. male admitted with a medical diagnosis of Alcoholic encephalopathy.  He presents with the following impairments/functional limitations: weakness, impaired endurance, impaired functional mobility, gait instability, impaired balance, impaired cognition, decreased safety awareness, decreased lower extremity function     Patient more pleasant today and agreeable to therapy. Patient performed supine to sit with SBA. Patient stood from EOB with SBA and ambulated in room x 30 feet with short, slow steps and with wide GLENNY. Patient corrected posture on verbal cues, but returned to a hunched over position after. Patient returned to sitting EOB then states that he needs the restroom. Patient stood with SBA and ambulated to toilet with SBA with same gait pattern. Patient sat on toilet and had BM. Patient used grab bar and stood. Patient required min A for safety cues while walking, including for posture and turns. Patient returned to sit EOB and then returned to supine with SBA. Patient left with HOB elevated and all needs in reach.     Rehab Prognosis: Good; patient would benefit from acute skilled PT services to address these deficits and reach maximum level of function.    Recent Surgery: * No surgery found *      Plan:     During this hospitalization, patient to be seen 5 x/week (5-6x weekly/1-2x daily) to address the identified rehab impairments via gait training, therapeutic activities, therapeutic exercises and progress toward the following goals:    Plan of Care Expires:  07/24/25    Subjective     Chief Complaint: none verbalized  Patient/Family Comments/goals: to go home  Pain/Comfort:         Objective:     Communicated with  nursing prior to session.  Patient found HOB elevated with bed alarm, telemetry, blood pressure cuff, pulse ox (continuous), peripheral IV upon PT entry to room.     General Precautions: Standard, fall  Orthopedic Precautions: N/A  Braces: N/A  Respiratory Status: Room air     Functional Mobility:  Bed Mobility:     Supine to Sit: stand by assistance  Sit to Supine: stand by assistance  Transfers:     Sit to Stand:  stand by assistance with no AD  Gait: 30 feet in room, 20 to/from restroom  Balance: required close SBA to walk today      AM-PAC 6 CLICK MOBILITY          Treatment & Education:  See above    Patient left HOB elevated with all lines intact, call button in reach, bed alarm on, and nurse notified..    GOALS:   Multidisciplinary Problems       Physical Therapy Goals          Problem: Physical Therapy    Goal Priority Disciplines Outcome Interventions   Physical Therapy Goal     PT, PT/OT Progressing    Description: Goals to be met by: discharge     Patient will increase functional independence with mobility by performin. Supine to sit with Stand-by Assistance  2. Sit to stand transfer with Stand-by Assistance  3. Gait  x 75 feet with Stand-by Assistance using No Assistive Device.                          DME Justifications:  No DME recommended requiring DME justifications    Time Tracking:     PT Received On: 25  PT Start Time: 1505     PT Stop Time: 1520  PT Total Time (min): 15 min     Billable Minutes: Gait Training 15    Treatment Type: Treatment  PT/PTA: PT           2025

## 2025-07-02 NOTE — PLAN OF CARE
Discussed discharge plan with patient along with his mother-Niyah Townsend at bedside.  Patient is adamant that he will not go to a facility for physical rehab.  He is agreeable to home health with PT at discharge.

## 2025-07-03 ENCOUNTER — TELEPHONE (OUTPATIENT)
Dept: FAMILY MEDICINE | Facility: CLINIC | Age: 60
End: 2025-07-03
Payer: OTHER GOVERNMENT

## 2025-07-03 VITALS
HEIGHT: 72 IN | HEART RATE: 101 BPM | DIASTOLIC BLOOD PRESSURE: 85 MMHG | OXYGEN SATURATION: 99 % | SYSTOLIC BLOOD PRESSURE: 140 MMHG | RESPIRATION RATE: 18 BRPM | TEMPERATURE: 98 F | BODY MASS INDEX: 23.26 KG/M2 | WEIGHT: 171.69 LBS

## 2025-07-03 LAB
ALBUMIN SERPL-MCNC: 2.7 G/DL (ref 3.4–5)
ALBUMIN/GLOB SERPL: 0.6 RATIO
ALP SERPL-CCNC: 145 UNIT/L (ref 50–144)
ALT SERPL-CCNC: 45 UNIT/L (ref 1–45)
AMMONIA PLAS-MSCNC: 51 UMOL/L (ref 11–32)
ANION GAP SERPL CALC-SCNC: 4 MEQ/L (ref 2–13)
AST SERPL-CCNC: 78 UNIT/L (ref 17–59)
BASOPHILS # BLD AUTO: 0.01 X10(3)/MCL (ref 0.01–0.08)
BASOPHILS NFR BLD AUTO: 0.3 % (ref 0.1–1.2)
BILIRUB SERPL-MCNC: 2.7 MG/DL (ref 0–1)
BUN SERPL-MCNC: 14 MG/DL (ref 7–20)
CALCIUM SERPL-MCNC: 9.1 MG/DL (ref 8.4–10.2)
CHLORIDE SERPL-SCNC: 114 MMOL/L (ref 98–110)
CO2 SERPL-SCNC: 21 MMOL/L (ref 21–32)
CREAT SERPL-MCNC: 0.94 MG/DL (ref 0.66–1.25)
CREAT/UREA NIT SERPL: 15 (ref 12–20)
EOSINOPHIL # BLD AUTO: 0.12 X10(3)/MCL (ref 0.04–0.54)
EOSINOPHIL NFR BLD AUTO: 3.6 % (ref 0.7–7)
ERYTHROCYTE [DISTWIDTH] IN BLOOD BY AUTOMATED COUNT: 16.7 %
GFR SERPLBLD CREATININE-BSD FMLA CKD-EPI: >90 ML/MIN/1.73/M2
GLOBULIN SER-MCNC: 4.3 GM/DL (ref 2–3.9)
GLUCOSE SERPL-MCNC: 120 MG/DL (ref 70–115)
HCT VFR BLD AUTO: 25.7 % (ref 36–52)
HGB BLD-MCNC: 8.5 G/DL (ref 13–18)
IMM GRANULOCYTES # BLD AUTO: 0 X10(3)/MCL (ref 0–0.03)
IMM GRANULOCYTES NFR BLD AUTO: 0 % (ref 0–0.5)
LYMPHOCYTES # BLD AUTO: 0.66 X10(3)/MCL (ref 1.32–3.57)
LYMPHOCYTES NFR BLD AUTO: 19.9 % (ref 20–55)
MCH RBC QN AUTO: 30.4 PG (ref 27–34)
MCHC RBC AUTO-ENTMCNC: 33.1 G/DL (ref 31–37)
MCV RBC AUTO: 91.8 FL (ref 79–99)
MONOCYTES # BLD AUTO: 0.56 X10(3)/MCL (ref 0.3–0.82)
MONOCYTES NFR BLD AUTO: 16.9 % (ref 4.7–12.5)
NEUTROPHILS # BLD AUTO: 1.97 X10(3)/MCL (ref 1.78–5.38)
NEUTROPHILS NFR BLD AUTO: 59.3 % (ref 37–73)
PLATELET # BLD AUTO: 40 X10(3)/MCL (ref 140–371)
PMV BLD AUTO: ABNORMAL FL
POTASSIUM SERPL-SCNC: 3.9 MMOL/L (ref 3.5–5.1)
PROT SERPL-MCNC: 7 GM/DL (ref 6.3–8.2)
RBC # BLD AUTO: 2.8 X10(6)/MCL (ref 4–6)
SODIUM SERPL-SCNC: 139 MMOL/L (ref 136–145)
WBC # BLD AUTO: 3.32 X10(3)/MCL (ref 4–11.5)

## 2025-07-03 PROCEDURE — 82140 ASSAY OF AMMONIA: CPT | Performed by: FAMILY MEDICINE

## 2025-07-03 PROCEDURE — 25000003 PHARM REV CODE 250: Performed by: FAMILY MEDICINE

## 2025-07-03 PROCEDURE — 80053 COMPREHEN METABOLIC PANEL: CPT | Performed by: FAMILY MEDICINE

## 2025-07-03 PROCEDURE — 36415 COLL VENOUS BLD VENIPUNCTURE: CPT | Performed by: FAMILY MEDICINE

## 2025-07-03 PROCEDURE — 94761 N-INVAS EAR/PLS OXIMETRY MLT: CPT

## 2025-07-03 PROCEDURE — 85025 COMPLETE CBC W/AUTO DIFF WBC: CPT | Performed by: FAMILY MEDICINE

## 2025-07-03 PROCEDURE — 63600175 PHARM REV CODE 636 W HCPCS: Performed by: FAMILY MEDICINE

## 2025-07-03 RX ORDER — LANOLIN ALCOHOL/MO/W.PET/CERES
100 CREAM (GRAM) TOPICAL DAILY
Qty: 30 TABLET | Refills: 12 | Status: SHIPPED | OUTPATIENT
Start: 2025-07-03

## 2025-07-03 RX ORDER — QUETIAPINE FUMARATE 50 MG/1
50 TABLET, FILM COATED ORAL 2 TIMES DAILY
Qty: 60 TABLET | Refills: 11 | Status: SHIPPED | OUTPATIENT
Start: 2025-07-03 | End: 2026-07-03

## 2025-07-03 RX ORDER — LOSARTAN POTASSIUM 50 MG/1
50 TABLET ORAL DAILY
Qty: 90 TABLET | Refills: 3 | Status: SHIPPED | OUTPATIENT
Start: 2025-07-04 | End: 2026-07-04

## 2025-07-03 RX ORDER — FAMOTIDINE 20 MG/1
20 TABLET, FILM COATED ORAL 2 TIMES DAILY
Qty: 60 TABLET | Refills: 11 | Status: SHIPPED | OUTPATIENT
Start: 2025-07-03 | End: 2026-07-03

## 2025-07-03 RX ORDER — LANOLIN ALCOHOL/MO/W.PET/CERES
800 CREAM (GRAM) TOPICAL 2 TIMES DAILY
Start: 2025-07-03

## 2025-07-03 RX ADMIN — LACTULOSE 60 G: 20 SOLUTION ORAL at 01:07

## 2025-07-03 RX ADMIN — POTASSIUM CHLORIDE 40 MEQ: 1500 TABLET, EXTENDED RELEASE ORAL at 09:07

## 2025-07-03 RX ADMIN — SODIUM CHLORIDE: 9 INJECTION, SOLUTION INTRAVENOUS at 09:07

## 2025-07-03 RX ADMIN — LACTULOSE 60 G: 20 SOLUTION ORAL at 05:07

## 2025-07-03 RX ADMIN — THIAMINE HCL TAB 100 MG 100 MG: 100 TAB at 05:07

## 2025-07-03 RX ADMIN — Medication 800 MG: at 09:07

## 2025-07-03 RX ADMIN — FAMOTIDINE 20 MG: 20 TABLET, FILM COATED ORAL at 09:07

## 2025-07-03 RX ADMIN — SODIUM CHLORIDE 125 MG: 9 INJECTION, SOLUTION INTRAVENOUS at 09:07

## 2025-07-03 RX ADMIN — LOSARTAN POTASSIUM 50 MG: 50 TABLET, FILM COATED ORAL at 09:07

## 2025-07-03 RX ADMIN — QUETIAPINE FUMARATE 50 MG: 25 TABLET ORAL at 09:07

## 2025-07-03 RX ADMIN — PROPRANOLOL HYDROCHLORIDE 20 MG: 20 TABLET ORAL at 09:07

## 2025-07-03 RX ADMIN — THIAMINE HCL TAB 100 MG 100 MG: 100 TAB at 01:07

## 2025-07-03 RX ADMIN — LACTULOSE 60 G: 20 SOLUTION ORAL at 09:07

## 2025-07-03 RX ADMIN — BACLOFEN 5 MG: 5 TABLET ORAL at 09:07

## 2025-07-03 RX ADMIN — CHLORPROMAZINE HYDROCHLORIDE 25 MG: 25 TABLET, FILM COATED ORAL at 09:07

## 2025-07-03 NOTE — PLAN OF CARE
07/03/25 1331   Final Note   Assessment Type Final Discharge Note   [facility]   What phone number can be called within the next 1-3 days to see how you are doing after discharge? [phone]   Hospital Resources/Appts/Education Provided Post-Acute resouces added to AVS;Appointments scheduled and added to AVS   Post-Acute Status   Post-Acute Authorization Home Health;Placement   Post-Acute Placement Status Patient declined/refused   Home Health Status Set-up Complete/Auth obtained   Patient choice form signed by patient/caregiver List with quality metrics by geographic area provided;List from CMS Compare;List from System Post-Acute Care   Discharge Delays None known at this time

## 2025-07-03 NOTE — DISCHARGE SUMMARY
[facility]/Surg  [facility] Medicine  Discharge Summary      Patient Name: Yong Townsend  MRN: [id number]  MILLY: [id number]  Patient Class: IP- Inpatient  Admission Date: 6/16/2025  Hospital Length of Stay: 17 days  Discharge Date and Time: 7/3/2025  1:40 PM  Attending Physician: No att. providers found   Discharging Provider: Taisha Cesar MD  Primary Care Provider: No primary care provider on file.    Primary Care Team: Networked reference to record PCT     HPI:   No notes on file    * No surgery found *      Hospital Course:   60 years old male patient with unknown PMH but taking Trazodone, Baclofen, and Tizanidine was brought to the emergency room to be evaluated for unresponsiveness. Patient is currently pleasantly confused, spontaneously moving extremities in the bed but not following command verbally. Entire history is obtained from emergency and nursing report. I did try to reach to patients mother but unable to reach. As per the report patient was at [facility] this morning and went for the washroom around 9. His mother found him unresponsive in the washroom around 10:30 but for some reason she did not call 911 until 1:30 PM. She thought that patient will be responsive, but he did not so 911 was eventually called. On arrival CT scan of the head, CTA of the head and neck and MRI of the brain were obtained. Neurologist was also consulted. MRI of the brain is negative for any acute infarct, CT head is also negative for any acute findings as well as CT of the head and neck. Initial workup showed ammonia level of 166, hemoglobin 9.3, platelet count 75, sodium 141, potassium 3.9, and dimer of 0.85. Hie did get CTA head and neck so CTA chest was not obtained. On arrival patient's GCS level was only 8 and was only responsive to painful stimuli. During the course of the emergency room he has become more awake and GCS score improved. Before MRI patient was agitated so he required IV Versed and  Lorazepam. Medicine team is asked to hospitalize the patient for further care. No fever or diarrhea or reported chest pain or SOB or tingling or numbness or slurred speech or focal weakness prior to unresponsiveness.      06/17/2025 pt admitted with hepatic encephalopathy, h/o significant ETOH use, drinks beer and whisky according to his mom.  Work up in ER essentially negative.    Pt apparently has 2 epic charts with different MRN, [id number] and this chart [id number] in the original chart he has multiple admissions to [facility] in [place] for alcoholic cirrhosis, h/o esophageal banding and varicees back to 2018, ETOH use, Cocaine +, Marijuana + , Has reportedly seen Dr. Mon in the past, saw DR. Rodriguez in 2022 for varicees after being transferred to [facility] from [facility].  Pt with h/o Thrombocytopenia last 5/2025 was 23, received platelts in [place] at that time.  Mild Curly and anemia requiring transfusions in the past.  His ammonia was 150+ on admit here.  06/18/2025 pt more awake this am, improving mental staus, more sleepy this am  06/19/2025 pt with increased aggitation started mid morning yesterday back on iv ativan needed some precedex overnight due to combative behaviors, currently resting off precedex received iv ativan, on high dose thiamine and MVI iv bannana bags  06/20/2025 pt still confused ativan giving 2mg iv q 2 hrs, pt not taking po ativan  3/21/2025 started taking po meds today with breakfast  Will try adding po taper today   3/22/2025 more awake today but confused  Tolerating po intake   Held ativan this AM   6/23/2025 more responsive today  But confused ammonia level back up today to 74  Will increase lactulose   6/24/2025 ammonia down to 30  Remains pretty weak  But mental status much improved   6/25/2025  Feels better today  Did well with PT   6/26/2025  Ammonia back up today to 89  Worked with PT but very weak  Interested in going to [facility] rehab   6/27/2025  Ammonia level < 9  today  Agitated overnight      06/28/2025 pt still very confused about his dates and times, oriented to self and place somewhat, intitially thought he was in [place], able to be redirected seems to confabulate a lot.  He has a very extensive vocabulary and is very intelligent but very limited on details and gets aggitated when challenged on his details especially related to recent behaviors in hospital.  06/29/2025 confusion is a bit less, still confabulates and very poor short term memory, less aggitated  06/30/2025 pt very sleepy in am after seroquel, still combative at times, throws things at staff this am, now sleeping  Today: Patient seen and examined at bedside, and chart reviewed.   07/01/2025 pt less confused, able to carry on conversation still gets easily aggitated and a bit argumentative at time, H&H has dropped to 6.9 will transfuse 1 U  07/02/2025 pt less confused, still argumentative at times, but is improving, Weak and workin gwith PT, not approved for rehab.  S/p 1 U PRBC, chronic blood loss due to GI and h/o cirrhosis and esophageal varicees, no acute blood loss.     07/03/2025 DISCHARGE SUMMARY: pt admitted with acute change in mental status and high ammonia level. Long h/o ETOH use and he was in D[place] on admission.  He was in ICU for an extended time receiving DT meds, prophylaxis and treatments with thiamine, MVI ativan, librium and precedex initially.  He has improved daily still aggitated at times, he has see telepsyche mutliple times and does not warrant a PEC at this time as he is not a threat to himself or others, he is somewhat disabled due to poor memory, but is imrpoving daily.  He has family support for d/c home and [facility] advanced [facility] failure.  He will take lactulose daily to continue to prevent hepatic encephalopathy and [facility]. Case management has arrange for home health and [facility].     Goals of Care  Treatment Preferences:  Code Status: Full Code         Consults:   Consults (From admission, onward)          Status Ordering Provider     Inpatient consult to Social Work/Case Management  Once        Provider:  (Not yet assigned)    Acknowledged CORY ARAGON     Inpatient consult to Telemedicine - Psychiatry  Once        Provider:  Alfred Mario NP    Acknowledged CORY ARAGON     Inpatient consult to Telemedicine - Psyc  Once        Provider:  (Not yet assigned)    Acknowledged MARKO PALMA     Inpatient consult to Social Work/Case Management  Once        Provider:  (Not yet assigned)    Acknowledged MARKO PALMA     Inpatient consult to Telemedicine - Psychiatry  Once        Provider:  Alfred Mario NP    Completed COYR ARAGON     Inpatient virtual consult to Hospital Medicine  Once        Provider:  (Not yet assigned)    Acknowledged MARKO PALMA            Assessment & Plan  Alcoholic encephalopathy  Improving     Anemia  Anemia is likely due to chronic blood loss and Iron deficiency. Most recent hemoglobin and hematocrit are listed below.  Recent Labs     07/01/25 0447 07/02/25 0452 07/03/25  0501   HGB 6.9* 7.9* 8.5*   HCT 21.0* 23.8* 25.7*   Have discussed with pt to avoid ETOH  Thrombocytopenia  The likely etiology of thrombocytopenia is liver disease. The patients 3 most recent labs are listed below.  Recent Labs     07/01/25 0447 07/02/25  0452 07/03/25  0501   PLT 39* 40* 40*     Plan  - Will transfuse if platelet count is <50k (if undergoing surgical procedure or have active bleeding).  -    Low but stable  No alcohol, no  nsaids  Hypertension  Patient's blood pressure range in the last 24 hours was: BP  Min: 118/71  Max: 155/98.The patient's inpatient anti-hypertensive regimen is listed below:  Current Antihypertensives  , 2 times daily, Oral  propranoloL tablet 20 mg, 2 times daily, Oral  labetalol 20 mg/4 mL (5 mg/mL) IV syring, Every 4 hours PRN,  Intravenous  hydrALAZINE injection 10 mg, Every 6 hours PRN, Intravenous  cloNIDine tablet 0.2 mg, Every 6 hours PRN, Oral  losartan tablet 50 mg, Daily, Oral  losartan (COZAAR) tablet, Daily, Oral    Plan  - BP is controlled, no changes needed to their regimen  -  losartan and propanolol at d/c  AMS (altered mental status)   hepatic encephalopathy  Improving   Continue lactulose at home      Overdose of undetermined intent  stable    Polypharmacy  Multiple sedating meds on board in setting of hepatic encephalopathy    Hyponatremia  Hyponatremia is likely due to Dehydration/hypovolemia. The patient's most recent sodium results are listed below.  Recent Labs     07/01/25  0447 07/02/25  0452 07/03/25  0501   * 135* 139     stable  Final Active Diagnoses:    Diagnosis Date Noted POA    PRINCIPAL PROBLEM:  Alcoholic encephalopathy [G31.2] 06/16/2025 Yes    Anemia [D64.9] 06/16/2025 Yes    Thrombocytopenia [D69.6] 06/16/2025 Yes    Hypertension [I10] 06/16/2025 Yes    Hyponatremia [E87.1] 07/01/2025 Yes    Polypharmacy [Z79.899] 06/17/2025 Not Applicable    AMS (altered mental status) [R41.82] 06/16/2025 Yes    Overdose of undetermined intent [T50.904A] 06/16/2025 Yes      Problems Resolved During this Admission:       Discharged Condition: good    Disposition: Home or Self Care    Follow Up:   Contact information for follow-up providers       [facility] Follow up.    Why: Call to make an appointment to establish PCP.  Contact information:  [address] [place] [address]  [phone]             Rosteet, Yasmin A., NP. Go on 7/23/2025.    Specialty: Family Medicine  Why: appointment with Yasmin Tate NP on July 23rd 2025 at 0930 am  Contact information:  [address] [place] [address]  [phone]                       Contact information for after-discharge care       Home Medical Care       [facility] .    Service: Home Health Services  Contact information:  [address]  [place] [address]  [phone]                                 Patient Instructions:      Activity as tolerated       Significant Diagnostic Studies: Labs: CMP   Recent Labs   Lab 07/02/25  0452 07/03/25  0501   * 139   K 4.0 3.9   * 114*   CO2 21 21   GLU 98 120*   BUN 14 14   CREATININE 0.91 0.94   CALCIUM 8.9 9.1   PROT 6.2* 7.0   ALBUMIN 2.3* 2.7*   BILITOT 2.6* 2.7*   ALKPHOS 131 145*   AST 72* 78*   ALT 38 45    and CBC   Recent Labs   Lab 07/02/25  0452 07/03/25  0501   WBC 2.86* 3.32*   HGB 7.9* 8.5*   HCT 23.8* 25.7*   PLT 40* 40*       Pending Diagnostic Studies:       None           Medications:  Reconciled Home Medications:      Medication List        START taking these medications      famotidine 20 MG tablet  Commonly known as: PEPCID  Take 1 tablet (20 mg total) by mouth 2 (two) times daily.     lactulose 20 gram/30 mL Soln  Commonly known as: CHRONULAC  Take 90 mLs (60 g total) by mouth every 4 (four) hours.     losartan 50 MG tablet  Commonly known as: COZAAR  Take 1 tablet (50 mg total) by mouth once daily.  Start taking on: July 4, 2025     magnesium oxide 400 mg (241.3 mg magnesium) tablet  Commonly known as: MAG-OX  Take 2 tablets (800 mg total) by mouth 2 (two) times daily.     QUEtiapine 50 MG tablet  Commonly known as: SEROQUEL  Take 1 tablet (50 mg total) by mouth 2 (two) times daily.     thiamine 100 MG tablet  Take 1 tablet (100 mg total) by mouth once daily.            CONTINUE taking these medications      baclofen 20 MG tablet  Commonly known as: LIORESAL  Take 20 mg by mouth 3 (three) times daily as needed (HICCUPS).     pantoprazole 40 MG tablet  Commonly known as: PROTONIX  Take 40 mg by mouth once daily.     propranoloL 20 MG tablet  Commonly known as: INDERAL  Take 20 mg by mouth 2 (two) times daily.     sildenafiL 100 MG tablet  Commonly known as: VIAGRA  Take 100 mg by mouth daily as needed for Erectile Dysfunction.     traZODone 100 MG tablet  Commonly known as:  DESYREL  Take 100 mg by mouth every evening.            STOP taking these medications      naproxen 500 MG tablet  Commonly known as: NAPROSYN     tiZANidine 4 MG tablet  Commonly known as: ZANAFLEX              Indwelling Lines/Drains at time of discharge:   Lines/Drains/Airways       None                             Time spent on the discharge of patient: 37 minutes    Patient Screened for food insecurity, housing instability, transportation needs, utility difficulties, and interpersonal safety.  No needs identified    Physical Exam  Vitals and nursing note reviewed. Exam conducted with a chaperone present.   Constitutional:       General: He is not in acute distress.     Appearance: Normal appearance. He is normal weight. He is not ill-appearing.   HENT:      Head: Normocephalic and atraumatic.      Nose: Nose normal.   Eyes:      General: No scleral icterus.     Conjunctiva/sclera: Conjunctivae normal.   Cardiovascular:      Rate and Rhythm: Normal rate and regular rhythm.      Pulses: Normal pulses.      Heart sounds: Normal heart sounds.   Pulmonary:      Effort: Pulmonary effort is normal.      Breath sounds: Normal breath sounds.   Abdominal:      General: Abdomen is flat. Bowel sounds are normal.      Palpations: Abdomen is soft.   Skin:     General: Skin is warm and dry.      Findings: No erythema or rash.   Neurological:      General: No focal deficit present.      Mental Status: He is alert and oriented to person, place, and time.   Psychiatric:         Mood and Affect: Mood normal.         Behavior: Behavior normal.         Thought Content: Thought content normal.              Taisha Cesar MD  Department of Hospital Medicine  [facility]

## 2025-07-03 NOTE — PT/OT/SLP DISCHARGE
Physical Therapy Discharge Summary    Name: Yong Townsend  MRN: [id number]   Principal Problem: Alcoholic encephalopathy     Patient Discharged from acute Physical Therapy on 7/3/25.  Please refer to prior PT noted date on 25 for functional status.     Assessment:     Goals partially met.    Objective:     GOALS:   Multidisciplinary Problems       Physical Therapy Goals       Not on file              Multidisciplinary Problems (Resolved)          Problem: Physical Therapy    Goal Priority Disciplines Outcome Interventions   Physical Therapy Goal   (Resolved)     PT, PT/OT Met    Description: Goals to be met by: discharge     Patient will increase functional independence with mobility by performin. Supine to sit with Stand-by Assistance  2. Sit to stand transfer with Stand-by Assistance  3. Gait  x 75 feet with Stand-by Assistance using No Assistive Device.                          Reasons for Discontinuation of Therapy Services  Satisfactory goal achievement.      Plan:     Patient Discharged to: Home with Home Health Service.      7/3/2025

## 2025-07-03 NOTE — PLAN OF CARE
Problem: Physical Therapy  Goal: Physical Therapy Goal  Description: Goals to be met by: discharge     Patient will increase functional independence with mobility by performin. Supine to sit with Stand-by Assistance  2. Sit to stand transfer with Stand-by Assistance  3. Gait  x 75 feet with Stand-by Assistance using No Assistive Device.     Outcome: Met

## 2025-07-03 NOTE — HOSPITAL COURSE
60 years old male patient with unknown PMH but taking Trazodone, Baclofen, and Tizanidine was brought to the emergency room to be evaluated for unresponsiveness. Patient is currently pleasantly confused, spontaneously moving extremities in the bed but not following command verbally. Entire history is obtained from emergency and nursing report. I did try to reach to patients mother but unable to reach. As per the report patient was at [facility] this morning and went for the washroom around 9. His mother found him unresponsive in the washroom around 10:30 but for some reason she did not call 911 until 1:30 PM. She thought that patient will be responsive, but he did not so 911 was eventually called. On arrival CT scan of the head, CTA of the head and neck and MRI of the brain were obtained. Neurologist was also consulted. MRI of the brain is negative for any acute infarct, CT head is also negative for any acute findings as well as CT of the head and neck. Initial workup showed ammonia level of 166, hemoglobin 9.3, platelet count 75, sodium 141, potassium 3.9, and dimer of 0.85. Hie did get CTA head and neck so CTA chest was not obtained. On arrival patient's GCS level was only 8 and was only responsive to painful stimuli. During the course of the emergency room he has become more awake and GCS score improved. Before MRI patient was agitated so he required IV Versed and Lorazepam. Medicine team is asked to hospitalize the patient for further care. No fever or diarrhea or reported chest pain or SOB or tingling or numbness or slurred speech or focal weakness prior to unresponsiveness.      06/17/2025 pt admitted with hepatic encephalopathy, h/o significant ETOH use, drinks beer and whisky according to his mom.  Work up in ER essentially negative.    Pt apparently has 2 epic charts with different MRN, [id number] and this chart [id number] in the original chart he has multiple admissions to [facility] in [place] for alcoholic  cirrhosis, h/o esophageal banding and varicees back to 2018, ETOH use, Cocaine +, Marijuana + , Has reportedly seen Dr. Mon in the past, saw DR. Rodriguez in 2022 for varicees after being transferred to [facility] from [facility].  Pt with h/o Thrombocytopenia last 5/2025 was 23, received platelts in [place] at that time.  Mild Curly and anemia requiring transfusions in the past.  His ammonia was 150+ on admit here.  06/18/2025 pt more awake this am, improving mental staus, more sleepy this am  06/19/2025 pt with increased aggitation started mid morning yesterday back on iv ativan needed some precedex overnight due to combative behaviors, currently resting off precedex received iv ativan, on high dose thiamine and MVI iv bannana bags  06/20/2025 pt still confused ativan giving 2mg iv q 2 hrs, pt not taking po ativan  3/21/2025 started taking po meds today with breakfast  Will try adding po taper today   3/22/2025 more awake today but confused  Tolerating po intake   Held ativan this AM   6/23/2025 more responsive today  But confused ammonia level back up today to 74  Will increase lactulose   6/24/2025 ammonia down to 30  Remains pretty weak  But mental status much improved   6/25/2025  Feels better today  Did well with PT   6/26/2025  Ammonia back up today to 89  Worked with PT but very weak  Interested in going to [facility] rehab   6/27/2025  Ammonia level < 9 today  Agitated overnight      06/28/2025 pt still very confused about his dates and times, oriented to self and place somewhat, intitially thought he was in [place], able to be redirected seems to confabulate a lot.  He has a very extensive vocabulary and is very intelligent but very limited on details and gets aggitated when challenged on his details especially related to recent behaviors in hospital.  06/29/2025 confusion is a bit less, still confabulates and very poor short term memory, less aggitated  06/30/2025 pt very sleepy in am after seroquel,  still combative at times, throws things at staff this am, now sleeping  Today: Patient seen and examined at bedside, and chart reviewed.   07/01/2025 pt less confused, able to carry on conversation still gets easily aggitated and a bit argumentative at time, H&H has dropped to 6.9 will transfuse 1 U  07/02/2025 pt less confused, still argumentative at times, but is improving, Weak and workin tiago PT, not approved for rehab.  S/p 1 U PRBC, chronic blood loss due to GI and h/o cirrhosis and esophageal varicees, no acute blood loss.     07/03/2025 DISCHARGE SUMMARY: pt admitted with acute change in mental status and high ammonia level. Long h/o ETOH use and he was in [place] on admission.  He was in ICU for an extended time receiving DT meds, prophylaxis and treatments with thiamine, MVI ativan, librium and precedex initially.  He has improved daily still aggitated at times, he has see telepsyche mutliple times and does not warrant a PEC at this time as he is not a threat to himself or others, he is somewhat disabled due to poor memory, but is imrpoving daily.  He has family support for d/c home and [facility] his advanced [facility] failure.  He will take lactulose daily to continue to prevent hepatic encephalopathy and [facility]. Case management has arrange for [facility] and [facility] PT [facility].

## 2025-07-03 NOTE — PLAN OF CARE
Patient has been accepted by [facility] and home health has been ordered thru [facility] with Sandy Conn.

## 2025-07-03 NOTE — NURSING
D/c instructions given with time allowed for questions, copy given to pt, pt d/c'd via wheelchair in stable condition with friend and TYLER Curry at side.

## 2025-07-03 NOTE — PLAN OF CARE
Problem: Adult Inpatient Plan of Care  Goal: Plan of Care Review  7/3/2025 1402 by Rama Molina RN  Outcome: Met  7/3/2025 1122 by Rama Molina RN  Outcome: Progressing  Goal: Patient-Specific Goal (Individualized)  7/3/2025 1402 by Rama Molina RN  Outcome: Met  7/3/2025 1122 by Rama Molina RN  Outcome: Progressing  Goal: Absence of Hospital-Acquired Illness or Injury  7/3/2025 1402 by Rama Molina RN  Outcome: Met  7/3/2025 1122 by Rama Molina RN  Outcome: Progressing  Goal: Optimal Comfort and Wellbeing  7/3/2025 1402 by Rama Molina RN  Outcome: Met  7/3/2025 1122 by Rama Molina RN  Outcome: Progressing  Goal: Readiness for Transition of Care  7/3/2025 1402 by Rama Molina RN  Outcome: Met  7/3/2025 1122 by Rama Molina RN  Outcome: Progressing     Problem: Delirium  Goal: Optimal Coping  7/3/2025 1402 by Rama Molina RN  Outcome: Met  7/3/2025 1122 by Rama Molina RN  Outcome: Progressing  Goal: Improved Behavioral Control  7/3/2025 1402 by Rama Molina RN  Outcome: Met  7/3/2025 1122 by Rama Molina RN  Outcome: Progressing  Goal: Improved Attention and Thought Clarity  7/3/2025 1402 by Rama Molina, RN  Outcome: Met  7/3/2025 1122 by Rama Molina, RN  Outcome: Progressing  Goal: Improved Sleep  7/3/2025 1402 by Rama Molina, RN  Outcome: Met  7/3/2025 1122 by Rama Molina RN  Outcome: Progressing     Problem: Fall Injury Risk  Goal: Absence of Fall and Fall-Related Injury  7/3/2025 1402 by Rama Molina, RN  Outcome: Met  7/3/2025 1122 by Rama Molina RN  Outcome: Progressing     Problem: Infection  Goal: Absence of Infection Signs and Symptoms  7/3/2025 1402 by Rama Molina, RN  Outcome: Met  7/3/2025 1122 by Jesse, Rama, RN  Outcome: Progressing     Problem: Skin Injury Risk Increased  Goal: Skin Health and Integrity  7/3/2025 1402 by Rama Molina, RN  Outcome: Met  7/3/2025 1122 by Rama Molina, RN  Outcome:  Progressing

## (undated) DEVICE — ELECTRODE REM POLYHESIVE II

## (undated) DEVICE — KIT SURGICAL COLON .25 1.1OZ

## (undated) DEVICE — COLLECTION SPECIMEN NEPTUNE

## (undated) DEVICE — SOL IRRI STRL WATER 1000ML

## (undated) DEVICE — TUBING O2 FEMALE CONN 13FT

## (undated) DEVICE — KIT CANIST SUCTION 1200CC

## (undated) DEVICE — TIP SUCTION YANKAUER